# Patient Record
Sex: FEMALE | Race: WHITE | NOT HISPANIC OR LATINO | Employment: OTHER | URBAN - METROPOLITAN AREA
[De-identification: names, ages, dates, MRNs, and addresses within clinical notes are randomized per-mention and may not be internally consistent; named-entity substitution may affect disease eponyms.]

---

## 2020-06-19 ENCOUNTER — DOCUMENTATION ONLY (OUTPATIENT)
Dept: OTHER | Facility: CLINIC | Age: 85
End: 2020-06-19

## 2020-06-22 DIAGNOSIS — T14.8XXA OPEN WOUND: Primary | ICD-10-CM

## 2020-06-23 RX ORDER — HONEY 100 %
PASTE (ML) TOPICAL
Qty: 44 ML | Refills: 97 | Status: SHIPPED | OUTPATIENT
Start: 2020-06-23 | End: 2020-07-05

## 2020-06-26 ENCOUNTER — TELEPHONE (OUTPATIENT)
Dept: GERIATRICS | Facility: CLINIC | Age: 85
End: 2020-06-26

## 2020-06-26 NOTE — TELEPHONE ENCOUNTER
[facility] now requests orders and shares plan of care/discharge summaries for some patients through [facility].  Please REPLY TO THIS MESSAGE OR ROUTE BACK TO THE AUTHOR in order to give authorization for orders when needed.  This is considered a verbal order, you will still receive a faxed copy of orders for signature.  Thank you for your assistance in improving collaboration for our patients.    ORDER  Skilled PT Intervention 2w3 (start next week) for caregiver education, strengthening, transfer training (potential to progress from [facility]inda?) and fall prevention.       **Wondering if she has any weightbearing precautions on LLE due to heel wound and if spinal precautions are still needed?

## 2020-06-29 DIAGNOSIS — E03.9 HYPOTHYROIDISM, UNSPECIFIED TYPE: Primary | ICD-10-CM

## 2020-06-29 DIAGNOSIS — I10 BENIGN ESSENTIAL HYPERTENSION: ICD-10-CM

## 2020-06-29 RX ORDER — LEVOTHYROXINE SODIUM 100 UG/1
TABLET ORAL
Qty: 31 TABLET | Refills: 97 | Status: SHIPPED | OUTPATIENT
Start: 2020-06-29

## 2020-06-29 RX ORDER — DILTIAZEM HYDROCHLORIDE 180 MG/1
CAPSULE, EXTENDED RELEASE ORAL
Qty: 31 CAPSULE | Refills: 97 | Status: SHIPPED | OUTPATIENT
Start: 2020-06-29 | End: 2020-07-22

## 2020-06-30 ENCOUNTER — ASSISTED LIVING VISIT (OUTPATIENT)
Dept: GERIATRICS | Facility: CLINIC | Age: 85
End: 2020-06-30
Payer: COMMERCIAL

## 2020-06-30 VITALS
HEART RATE: 97 BPM | SYSTOLIC BLOOD PRESSURE: 127 MMHG | RESPIRATION RATE: 16 BRPM | DIASTOLIC BLOOD PRESSURE: 70 MMHG | TEMPERATURE: 95.5 F

## 2020-06-30 DIAGNOSIS — S91.309A WOUND OF FOOT: ICD-10-CM

## 2020-06-30 DIAGNOSIS — I89.0 LYMPHEDEMA: ICD-10-CM

## 2020-06-30 DIAGNOSIS — E03.9 HYPOTHYROIDISM, UNSPECIFIED TYPE: ICD-10-CM

## 2020-06-30 DIAGNOSIS — R53.81 PHYSICAL DECONDITIONING: ICD-10-CM

## 2020-06-30 DIAGNOSIS — T14.8XXA OPEN WOUND: ICD-10-CM

## 2020-06-30 DIAGNOSIS — G06.2 EPIDURAL ABSCESS: Primary | ICD-10-CM

## 2020-06-30 DIAGNOSIS — I10 BENIGN ESSENTIAL HYPERTENSION: ICD-10-CM

## 2020-06-30 DIAGNOSIS — I48.91 ATRIAL FIBRILLATION, UNSPECIFIED TYPE (H): ICD-10-CM

## 2020-06-30 PROCEDURE — 99207 ZZC CDG-CODE CATEGORY CHANGED: CPT | Performed by: NURSE PRACTITIONER

## 2020-06-30 RX ORDER — AMOXICILLIN 500 MG/1
1000 CAPSULE ORAL 2 TIMES DAILY
COMMUNITY
End: 2020-08-08

## 2020-06-30 RX ORDER — NYSTATIN 100000 [USP'U]/G
POWDER TOPICAL 3 TIMES DAILY PRN
COMMUNITY
End: 2020-07-12

## 2020-06-30 RX ORDER — ACETAMINOPHEN 325 MG/1
650 TABLET ORAL EVERY 4 HOURS PRN
COMMUNITY
End: 2021-03-26 | Stop reason: ALTCHOICE

## 2020-06-30 RX ORDER — POLYETHYLENE GLYCOL 3350 17 G/17G
17 POWDER, FOR SOLUTION ORAL DAILY PRN
COMMUNITY

## 2020-06-30 NOTE — LETTER
"    6/30/2020        RE: Ila Loredo  [facility]  [address]  [address]  [place] [address]        [place] [facility]  PRIMARY CARE PROVIDER AND CLINIC:  Trinity Singh, OPAL CNP, [address] / [place] [address]  Chief Complaint   Patient presents with     Establish Care     [facility] Medical Record Number:  [id number]  Place of Service where encounter took place:  [facility] [[id number]]    Ila Loredo  is a 89 year old  (6/4/1930), admitted to the above facility from home..  Admitted to this facility for  rehab, medical management and nursing care.    HPI:    HPI information obtained from: facility chart records, facility staff, patient report, [facility] chart review, Care Everywhere Epic chart review and family/first contact 's report.     Brief Summary of Hospital Course:     This is a 89-year-old female, with a past medical history significant for lymphedema, hypertension, hypothyroidism and chronic distal left lower extremity wound, who was admitted to [facility] on 3/21/20 after a fall with nausea, weakness and decreased oral intake. Labs revealed WBC 52.3, Creatinine 1.2, Procalcitonin 15.65. A lumbar spine x-ray revealed no acute findings, but a lumbar MRI revealed \"1. Lobulated dorsal epidural fluid collection extending from T11-L2-3 demonstrates thin peripheral enhancement and is most compatible with abscess\".  IV antibiotics were initiated. On 3/23/20, noted to have significant decline in leg function and was brought to the OR for an emergent I&D of presumed abscess. Culture positive for Strep species consistent with blood culture positive for Streptococcus Group B. Vancomycin was discontinued on 3/24/20 with continuation of Ceftriaxone for 25 days upon hospital discharge.The drain was removed on 3/25/20. An bilateral lower extremity ultrasound revealed a \"short segment " "of nonocclusive thrombus within the left popliteal vein potentially subacute or chronic\". An MRI of the right foot revealed \"findings of osteomyelitis of the proximal and distal phalanges of the great toe\". Podiatry debrided foot a bedside. [place] MRI findings were reactive, not osteomyelitis. Had brief episode of atrial fibrillation with RVR and was started on Diltiazem. Not a candidate for anticoagulation. On 4/2/20, had respiratory failure and near arrest. Patient suctioned for an enormous dried, hardened mucous plug. Also had delirium.     Discharged to [facility]. Continued to be primarily wheelchair bound requiring assistance with transfers and could not return home.     Updates on Status Since Skilled nursing Admission:     Most of the history comes from . Patient lived in a condo with her  PTA. Goal is to get stronger with home therapy so she can return home. Has 5 children, 2 girls and 3 boys, most of which live in the area. 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 Would prefer to only have visits when medically indicated.     When reviewing code status, patient initially states she would be agreeable to CPR and intubation if there was a chance she'd survive. When discussing CPR and intubation in further detail, starts to question her decision. Elects to leave current code status on file. Will think about it.  tells patient she would want to be DNR/DNI.    CODE STATUS/ADVANCE DIRECTIVES DISCUSSION:   DNR / DNI  Patient's living condition: lives in an assisted living facility  ALLERGIES: Codeine  PAST MEDICAL HISTORY:  has no past medical history on file.  PAST SURGICAL HISTORY:   has no past surgical history on file.  FAMILY HISTORY: family history is not on file.  SOCIAL HISTORY:       Post Discharge Medication Reconciliation Status: discharge medications reconciled, continue medications without change    Current Outpatient Medications   Medication Sig Dispense " Refill     acetaminophen (TYLENOL) 325 MG tablet Take 650 mg by mouth every 4 hours as needed for mild pain       amoxicillin (AMOXIL) 500 MG capsule Take 1,000 mg by mouth 2 times daily       CARTIA  MG 24 hr capsule TAKE 1 CAPSULE BY MOUTH ONCE DAILY 31 capsule 97     levothyroxine (SYNTHROID/LEVOTHROID) 100 MCG tablet TAKE 1 TABLET BY MOUTH ONCE DAILY 31 tablet 97     nystatin (MYCOSTATIN) 207553 UNIT/GM external powder Apply topically 3 times daily as needed       polyethylene glycol (MIRALAX) 17 g packet Take 1 packet by mouth as needed for constipation       Wound Dressings (MEDIHONEY WOUND/BURN DRESSING) paste Apply topically daily And PRN 44 mL 97     ROS:  4 point ROS including Respiratory, CV, GI and , other than that noted in the HPI,  is negative    Vitals:  /70   Pulse 97   Temp 95.5  F (35.3  C)   Resp 16   Exam:  GENERAL APPEARANCE:  Alert, in no distress  ENT:  Mouth and posterior oropharynx normal, moist mucous membranes  EYES:  EOM, conjunctivae, lids, pupils and irises normal  RESP:  respiratory effort and palpation of chest normal, lungs clear to auscultation , no respiratory distress  CV:  Palpation and auscultation of heart done , irregular rhythm regular rate  M/S:   Lymphedema on BLE  SKIN:  Dressing in place on left foot  PSYCH:  oriented to person and place    Lab/Diagnostic data:  Labs done in SNF are in [facility]. Please refer to them using [facility]/Care Everywhere.    ASSESSMENT/PLAN:  Epidural Abscess at T11-L3 S/P I&D on 3/23/20 and Decompression with Bacteremia. Culture positive for Strep species consistent with blood culture positive for Streptococcus Group B. Vancomycin was completed on 3/24/20 with continuation of Ceftriaxone until 5/7/20 then Amoxicillin until 8/1/20. Per ID note on 6/3/20, overall not improving. Not walking. Refuses MRI of lumbar spine, but did get CT of lumbar spine on 6/2/20 with no clear infection or enhancement. WBC 14 with ESR and CRP  remaining elevated, but improved. Due for follow-up in the next week or so. WBC, Creatinine, ESR and CRP every 2 weeks.    Left Heel and Stage II Coccyx Wound. Medihoney applied daily and PRN to coccyx wound. [facility] RN following.    Atrial Fibrillation. With RVR during hospitalization. Started on Diltiazem. Not a candidate for anticoagulation given frequent falls. Has had episodes of elevated heart rate since admission to AL, but heart rate is not always taken manually. Requested staff check manually to confirm readings on machine are accurate. [facility] RN following.    Cognitive Impairment.  does most of the talking during today's visit. Experienced delirium during hospitalization. SLUMS 22/30 on 6/15/20. CPT Score 4.0 on 6/29/20. [facility] to evaluate cognitive function.    Bilateral Lower Extremity Edema. [facility] to start compression bandaging. No recent weight on file.    Hypertension. Monitor blood pressure monthly. Takes Diltiazem for atrial fibrillation as noted above.    Hypothyroidism. Last TSH 2.10 on 3/12/20. Continue Levothyroxine as ordered.    Physical Deconditioning. Secondary to most recent hospitalization and co-morbidities. Home Physical and Occupational Therapy ordered as well as RN. Goal is for patient to get stronger and return home with .    Total time spent with patient visit at the skilled nursing facility was 35 min including patient visit and review of past records. Greater than 50% of total time spent with counseling and coordinating care due to review of code status with patient, discussing role of provider, plans going forward and overall health status with patient and .     Electronically signed by:  OPAL Patterson CNP                Sincerely,        OPAL Patterson CNP

## 2020-06-30 NOTE — PROGRESS NOTES
"[place] [facility]  PRIMARY CARE PROVIDER AND CLINIC:  Trinity Singh, APRN CNP, [address] / [place] [address]  Chief Complaint   Patient presents with     Establish Care     [facility] Medical Record Number:  [id number]  Place of Service where encounter took place:  [facility] [[id number]]    Ila Loredo  is a 89 year old  (6/4/1930), admitted to the above facility from home..  Admitted to this facility for  rehab, medical management and nursing care.    HPI:    HPI information obtained from: facility chart records, facility staff, patient report, [facility] chart review, Care Everywhere Epic chart review and family/first contact 's report.     Brief Summary of Hospital Course:     This is a 89-year-old female, with a past medical history significant for lymphedema, hypertension, hypothyroidism and chronic distal left lower extremity wound, who was admitted to [facility] on 3/21/20 after a fall with nausea, weakness and decreased oral intake. Labs revealed WBC 52.3, Creatinine 1.2, Procalcitonin 15.65. A lumbar spine x-ray revealed no acute findings, but a lumbar MRI revealed \"1. Lobulated dorsal epidural fluid collection extending from T11-L2-3 demonstrates thin peripheral enhancement and is most compatible with abscess\".  IV antibiotics were initiated. On 3/23/20, noted to have significant decline in leg function and was brought to the OR for an emergent I&D of presumed abscess. Culture positive for Strep species consistent with blood culture positive for Streptococcus Group B. Vancomycin was discontinued on 3/24/20 with continuation of Ceftriaxone for 25 days upon hospital discharge.The drain was removed on 3/25/20. An bilateral lower extremity ultrasound revealed a \"short segment of nonocclusive thrombus within the left popliteal vein potentially subacute or chronic\". An MRI of the right foot revealed " "\"findings of osteomyelitis of the proximal and distal phalanges of the great toe\". Podiatry debrided foot a bedside. [place] MRI findings were reactive, not osteomyelitis. Had brief episode of atrial fibrillation with RVR and was started on Diltiazem. Not a candidate for anticoagulation. On 4/2/20, had respiratory failure and near arrest. Patient suctioned for an enormous dried, hardened mucous plug. Also had delirium.     Discharged to [facility]. Continued to be primarily wheelchair bound requiring assistance with transfers and could not return home.     Updates on Status Since Skilled nursing Admission:     Most of the history comes from . Patient lived in a condo with her  PTA. Goal is to get stronger with home therapy so she can return home. Has 5 children, 2 girls and 3 boys, most of which live in the area. 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 Would prefer to only have visits when medically indicated.     When reviewing code status, patient initially states she would be agreeable to CPR and intubation if there was a chance she'd survive. When discussing CPR and intubation in further detail, starts to question her decision. Elects to leave current code status on file. Will think about it.  tells patient she would want to be DNR/DNI.    CODE STATUS/ADVANCE DIRECTIVES DISCUSSION:   DNR / DNI  Patient's living condition: lives in an assisted living facility  ALLERGIES: Codeine  PAST MEDICAL HISTORY:  has no past medical history on file.  PAST SURGICAL HISTORY:   has no past surgical history on file.  FAMILY HISTORY: family history is not on file.  SOCIAL HISTORY:       Post Discharge Medication Reconciliation Status: discharge medications reconciled, continue medications without change    Current Outpatient Medications   Medication Sig Dispense Refill     acetaminophen (TYLENOL) 325 MG tablet Take 650 mg by mouth every 4 hours as needed for mild pain       " amoxicillin (AMOXIL) 500 MG capsule Take 1,000 mg by mouth 2 times daily       CARTIA  MG 24 hr capsule TAKE 1 CAPSULE BY MOUTH ONCE DAILY 31 capsule 97     levothyroxine (SYNTHROID/LEVOTHROID) 100 MCG tablet TAKE 1 TABLET BY MOUTH ONCE DAILY 31 tablet 97     nystatin (MYCOSTATIN) 298910 UNIT/GM external powder Apply topically 3 times daily as needed       polyethylene glycol (MIRALAX) 17 g packet Take 1 packet by mouth as needed for constipation       Wound Dressings (MEDIHONEY WOUND/BURN DRESSING) paste Apply topically daily And PRN 44 mL 97     ROS:  4 point ROS including Respiratory, CV, GI and , other than that noted in the HPI,  is negative    Vitals:  /70   Pulse 97   Temp 95.5  F (35.3  C)   Resp 16   Exam:  GENERAL APPEARANCE:  Alert, in no distress  ENT:  Mouth and posterior oropharynx normal, moist mucous membranes  EYES:  EOM, conjunctivae, lids, pupils and irises normal  RESP:  respiratory effort and palpation of chest normal, lungs clear to auscultation , no respiratory distress  CV:  Palpation and auscultation of heart done , irregular rhythm regular rate  M/S:   Lymphedema on BLE  SKIN:  Dressing in place on left foot  PSYCH:  oriented to person and place    Lab/Diagnostic data:  Labs done in SNF are in [facility]. Please refer to them using [employer] Everywhere.    ASSESSMENT/PLAN:  Epidural Abscess at T11-L3 S/P I&D on 3/23/20 and Decompression with Bacteremia. Culture positive for Strep species consistent with blood culture positive for Streptococcus Group B. Vancomycin was completed on 3/24/20 with continuation of Ceftriaxone until 5/7/20 then Amoxicillin until 8/1/20. Per ID note on 6/3/20, overall not improving. Not walking. Refuses MRI of lumbar spine, but did get CT of lumbar spine on 6/2/20 with no clear infection or enhancement. WBC 14 with ESR and CRP remaining elevated, but improved. Due for follow-up in the next week or so. WBC, Creatinine, ESR and CRP every 2  weeks.    Left Heel and Stage II Coccyx Wound. Medihoney applied daily and PRN to coccyx wound. [facility] RN following.    Atrial Fibrillation. With RVR during hospitalization. Started on Diltiazem. Not a candidate for anticoagulation given frequent falls. Has had episodes of elevated heart rate since admission to AL, but heart rate is not always taken manually. Requested staff check manually to confirm readings on machine are accurate. [facility] RN following.    Cognitive Impairment.  does most of the talking during today's visit. Experienced delirium during hospitalization. SLUMS 22/30 on 6/15/20. CPT Score 4.0 on 6/29/20. [facility] to evaluate cognitive function.    Bilateral Lower Extremity Edema. [facility] to start compression bandaging. No recent weight on file.    Hypertension. Monitor blood pressure monthly. Takes Diltiazem for atrial fibrillation as noted above.    Hypothyroidism. Last TSH 2.10 on 3/12/20. Continue Levothyroxine as ordered.    Physical Deconditioning. Secondary to most recent hospitalization and co-morbidities. Home Physical and Occupational Therapy ordered as well as RN. Goal is for patient to get stronger and return home with .    Total time spent with patient visit at the skilled nursing facility was 35 min including patient visit and review of past records. Greater than 50% of total time spent with counseling and coordinating care due to review of code status with patient, discussing role of provider, plans going forward and overall health status with patient and .     Electronically signed by:  OPAL Patterson CNP

## 2020-07-05 RX ORDER — HONEY 100 %
PASTE (ML) TOPICAL DAILY
Qty: 44 ML | Refills: 97
Start: 2020-07-05 | End: 2021-05-18

## 2020-07-12 DIAGNOSIS — B37.2 CANDIDIASIS OF SKIN: Primary | ICD-10-CM

## 2020-07-12 RX ORDER — NYSTATIN 100000 [USP'U]/G
POWDER TOPICAL
Qty: 60 G | Refills: 97 | Status: SHIPPED | OUTPATIENT
Start: 2020-07-12

## 2020-07-15 ENCOUNTER — RECORDS - HEALTHEAST (OUTPATIENT)
Dept: LAB | Facility: CLINIC | Age: 85
End: 2020-07-15

## 2020-07-22 ENCOUNTER — ASSISTED LIVING VISIT (OUTPATIENT)
Dept: GERIATRICS | Facility: CLINIC | Age: 85
End: 2020-07-22
Payer: COMMERCIAL

## 2020-07-22 VITALS
SYSTOLIC BLOOD PRESSURE: 127 MMHG | HEART RATE: 98 BPM | RESPIRATION RATE: 16 BRPM | DIASTOLIC BLOOD PRESSURE: 79 MMHG | TEMPERATURE: 95.8 F

## 2020-07-22 DIAGNOSIS — I48.91 ATRIAL FIBRILLATION, UNSPECIFIED TYPE (H): Primary | ICD-10-CM

## 2020-07-22 DIAGNOSIS — E03.9 HYPOTHYROIDISM, UNSPECIFIED TYPE: ICD-10-CM

## 2020-07-22 DIAGNOSIS — T14.8XXA OPEN WOUND: ICD-10-CM

## 2020-07-22 DIAGNOSIS — I10 BENIGN ESSENTIAL HYPERTENSION: ICD-10-CM

## 2020-07-22 DIAGNOSIS — R60.0 LOCALIZED EDEMA: ICD-10-CM

## 2020-07-22 RX ORDER — DILTIAZEM HYDROCHLORIDE 300 MG/1
300 CAPSULE, COATED, EXTENDED RELEASE ORAL DAILY
Start: 2020-07-22 | End: 2020-07-29

## 2020-07-22 NOTE — PROGRESS NOTES
[place] [facility]  [facility] Medical Record Number:  [id number]  Place of Service where encounter took place:  [facility] [[id number]]  Chief Complaint   Patient presents with     RECHECK     HPI:    Ila Loredo  is a 89 year old (6/4/1930), who is being seen today for an episodic care visit.  HPI information obtained from: facility chart records, facility staff, patient report, [facility] chart review and family/first contact  report. Today's concern is:    Epidural Abscess at T11-L3 S/P I&D on 3/23/20 and Decompression with Bacteremia. Last seen by Dr. Coffey on 6/4/20 with instructions to follow-up in 4-5 weeks. Today, patient notes no concerns. Vital signs on 7/20/20 stable.      Left Heel and Stage II Coccyx Wound. Patient reports wounds are improving. Left heel on 7/20/20 noted to be 1x2 and 2x0 with .2 cm slough that is improving. Sacral wound with 1 area healed and 2 others improving, 0.7 x 0, 0.9 x 0.1 cm upper. Lower 0.2 x 0.2 x 0.1 cm.     Atrial Fibrillation. Home Health RN reported tachycardia on 6/19/20. Pulse 86 on 6/21/20 and 97 on 6/29/20. Also received report on 7/13/20 of an elevated heart rate of 134 and blood pressure 122/80 on 7/10/20. Order given to increase Diltiazem on 7/13/20. Daily blood pressure and heart rate ordered x 5 days with systolic blood pressure range 108-127. Diastolic blood pressure from 72-83. Heart rate . Per patient report, denies palpitations.      Cognitive Impairment. CPT 4.0 on 6/29/20. SLUMS 22/30 on 6/15/20.      Bilateral Lower Extremity Edema. Continues to work with Occupational Therapy. Compression bandaging applied. No recent weights available to review.     Hypertension. Blood pressures obtained daily x 5 days starting on 7/14/20. Systolic blood pressure range 108-127. Diastolic blood pressure range from 72-83     Hypothyroidism. Last TSH 2.10 on 3/12/20    Past Medical and Surgical  History reviewed in Epic today.    MEDICATIONS:  Current Outpatient Medications   Medication Sig Dispense Refill     acetaminophen (TYLENOL) 325 MG tablet Take 650 mg by mouth every 4 hours as needed for mild pain       amoxicillin (AMOXIL) 500 MG capsule Take 1,000 mg by mouth 2 times daily       CARTIA  MG 24 hr capsule TAKE 1 CAPSULE BY MOUTH ONCE DAILY 31 capsule 97     levothyroxine (SYNTHROID/LEVOTHROID) 100 MCG tablet TAKE 1 TABLET BY MOUTH ONCE DAILY 31 tablet 97     NYAMYC 033670 UNIT/GM external powder APPLY TOPICALLY TO AFFECTED AREA(S) THREE TIMES DAILY AS NEEDED 60 g 97     polyethylene glycol (MIRALAX) 17 g packet Take 1 packet by mouth as needed for constipation       Wound Dressings (MEDIHONEY WOUND/BURN DRESSING) paste Apply topically daily And PRN 44 mL 97     REVIEW OF SYSTEMS:  4 point ROS including Respiratory, CV, GI and , other than that noted in the HPI,  is negative    Objective:  /79   Pulse 98   Temp 95.8  F (35.4  C)   Resp 16   Exam Limited due to COVID-19 Pandemic:  GENERAL APPEARANCE:  Alert, in no distress  ENT:  Mouth and posterior oropharynx normal, moist mucous membranes  EYES:  EOM, conjunctivae, lids, pupils and irises normal  CV:  Palpation and auscultation of heart done , irregular rhythm tachycardia  SKIN:  Compression bandages on BLE  PSYCH:  affect and mood normal    Labs:   Labs done in SNF are in [facility]. Please refer to them using [facility]/Care Everywhere.    ASSESSMENT/PLAN:  Epidural Abscess at T11-L3 S/P I&D on 3/23/20 and Decompression with Bacteremia. Culture positive for Strep species consistent with blood culture positive for Streptococcus Group B. Vancomycin was completed on 3/24/20 with continuation of Ceftriaxone until 5/7/20 then Amoxicillin until 8/1/20. Per ID note on 6/3/20, refuses MRI of lumbar spine, but did get CT of lumbar spine on 6/2/20 with no clear infection or enhancement. Will discuss arranging ID follow-up with patient and  family at care conference. WBC, Creatinine, ESR and CRP every 2 weeks per ID. Will need to check with staff if this has been ordered.      Left Heel and Stage II Coccyx Wound. Improving per [facility] RN. Continue dressing changes as ordered.     Atrial Fibrillation. With RVR during hospitalization. Started on Diltiazem during hospitalization. Not a candidate for anticoagulation given frequent falls. Diltiazem last increased on 7/13/20. During today's visit, heart rate irregular and 110. Will increase Diltiazem to 300 mg every day. Discussed with patient and  establishing care with Cardiologist given elevated heart rate and risk for heart attack or stroke. Patient and  report she's always had an elevated heart rate. Would rather not go out for an appointment. Reviewed option of virtual or telephone visit. Cannot find formal Cardiology consult performed in hospital so will need to establish care. Will discuss in further detail with daughter and son during care conference on 7/27/20.      Cognitive Impairment.  does most of the talking during today's visit. Experienced delirium during hospitalization. SLUMS 22/30 on 6/15/20. CPT Score 4.0 on 6/29/20. [facility] to evaluate cognitive function.     Bilateral Lower Extremity Edema. [facility] performing compression bandaging. No recent weight on file.     Hypertension. Will ask staff to monitor blood pressure daily x 1 week due to increase in Diltiazem.      Hypothyroidism. Last TSH 2.10 on 3/12/20. Continue Levothyroxine as ordered.     Physical Deconditioning. Secondary to most recent hospitalization and co-morbidities. Home Physical and Occupational Therapy ordered as well as RN. Goal is for patient to get stronger and return home with .    Electronically signed by:  OPAL Patterson CNP

## 2020-07-22 NOTE — LETTER
7/22/2020        RE: Ila Loredo  [facility]  [address] [facility]  [address] [place] [id number]        [place] [facility]  [place] Medical Record Number:  [id number]  Place of Service where encounter took place:  [facility] [[id number]]  Chief Complaint   Patient presents with     RECHECK     HPI:    Ila Loredo  is a 89 year old (6/4/1930), who is being seen today for an episodic care visit.  HPI information obtained from: facility chart records, facility staff, patient report, [facility] chart review and family/first contact  report. Today's concern is:    Epidural Abscess at T11-L3 S/P I&D on 3/23/20 and Decompression with Bacteremia. Last seen by Dr. Coffey on 6/4/20 with instructions to follow-up in 4-5 weeks. Today, patient notes no concerns. Vital signs on 7/20/20 stable.      Left Heel and Stage II Coccyx Wound. Patient reports wounds are improving. Left heel on 7/20/20 noted to be 1x2 and 2x0 with .2 cm slough that is improving. Sacral wound with 1 area healed and 2 others improving, 0.7 x 0, 0.9 x 0.1 cm upper. Lower 0.2 x 0.2 x 0.1 cm.     Atrial Fibrillation. Home Health RN reported tachycardia on 6/19/20. Pulse 86 on 6/21/20 and 97 on 6/29/20. Also received report on 7/13/20 of an elevated heart rate of 134 and blood pressure 122/80 on 7/10/20. Order given to increase Diltiazem on 7/13/20. Daily blood pressure and heart rate ordered x 5 days with systolic blood pressure range 108-127. Diastolic blood pressure from 72-83. Heart rate . Per patient report, denies palpitations.      Cognitive Impairment. CPT 4.0 on 6/29/20. SLUMS 22/30 on 6/15/20.      Bilateral Lower Extremity Edema. Continues to work with Occupational Therapy. Compression bandaging applied. No recent weights available to review.     Hypertension. Blood pressures obtained daily x 5 days starting on 7/14/20. Systolic blood pressure  range 108-127. Diastolic blood pressure range from 72-83     Hypothyroidism. Last TSH 2.10 on 3/12/20    Past Medical and Surgical History reviewed in Epic today.    MEDICATIONS:  Current Outpatient Medications   Medication Sig Dispense Refill     acetaminophen (TYLENOL) 325 MG tablet Take 650 mg by mouth every 4 hours as needed for mild pain       amoxicillin (AMOXIL) 500 MG capsule Take 1,000 mg by mouth 2 times daily       CARTIA  MG 24 hr capsule TAKE 1 CAPSULE BY MOUTH ONCE DAILY 31 capsule 97     levothyroxine (SYNTHROID/LEVOTHROID) 100 MCG tablet TAKE 1 TABLET BY MOUTH ONCE DAILY 31 tablet 97     NYAMYC 022474 UNIT/GM external powder APPLY TOPICALLY TO AFFECTED AREA(S) THREE TIMES DAILY AS NEEDED 60 g 97     polyethylene glycol (MIRALAX) 17 g packet Take 1 packet by mouth as needed for constipation       Wound Dressings (MEDIHONEY WOUND/BURN DRESSING) paste Apply topically daily And PRN 44 mL 97     REVIEW OF SYSTEMS:  4 point ROS including Respiratory, CV, GI and , other than that noted in the HPI,  is negative    Objective:  /79   Pulse 98   Temp 95.8  F (35.4  C)   Resp 16   Exam Limited due to COVID-19 Pandemic:  GENERAL APPEARANCE:  Alert, in no distress  ENT:  Mouth and posterior oropharynx normal, moist mucous membranes  EYES:  EOM, conjunctivae, lids, pupils and irises normal  CV:  Palpation and auscultation of heart done , irregular rhythm tachycardia  SKIN:  Compression bandages on BLE  PSYCH:  affect and mood normal    Labs:   [facility]. Please refer to them using [facility]/Care Everywhere.    ASSESSMENT/PLAN:  Epidural Abscess at T11-L3 S/P I&D on 3/23/20 and Decompression with Bacteremia. Culture positive for Strep species consistent with blood culture positive for Streptococcus Group B. Vancomycin was completed on 3/24/20 with continuation of Ceftriaxone until 5/7/20 then Amoxicillin until 8/1/20. Per ID note on 6/3/20, refuses MRI of lumbar spine, but did  get CT of lumbar spine on 6/2/20 with no clear infection or enhancement. Will discuss arranging ID follow-up with patient and family at care conference. WBC, Creatinine, ESR and CRP every 2 weeks per ID. Will need to check with staff if this has been ordered.      Left Heel and Stage II Coccyx Wound. Improving per [facility] RN. Continue dressing changes as ordered.     Atrial Fibrillation. With RVR during hospitalization. Started on Diltiazem during hospitalization. Not a candidate for anticoagulation given frequent falls. Diltiazem last increased on 7/13/20. During today's visit, heart rate irregular and 110. Will increase Diltiazem to 300 mg every day. Discussed with patient and  establishing care with Cardiologist given elevated heart rate and risk for heart attack or stroke. Patient and  report she's always had an elevated heart rate. Would rather not go out for an appointment. Reviewed option of virtual or telephone visit. Cannot find formal Cardiology consult performed in hospital so will need to establish care. Will discuss in further detail with daughter and son during care conference on 7/27/20.      Cognitive Impairment.  does most of the talking during today's visit. Experienced delirium during hospitalization. SLUMS 22/30 on 6/15/20. CPT Score 4.0 on 6/29/20. [facility] to evaluate cognitive function.     Bilateral Lower Extremity Edema. [facility] performing compression bandaging. No recent weight on file.     Hypertension. Will ask staff to monitor blood pressure daily x 1 week due to increase in Diltiazem.      Hypothyroidism. Last TSH 2.10 on 3/12/20. Continue Levothyroxine as ordered.     Physical Deconditioning. Secondary to most recent hospitalization and co-morbidities. Home Physical and Occupational Therapy ordered as well as RN. Goal is for patient to get stronger and return home with .    Electronically  signed by:  OPAL Patterson CNP             Sincerely,        OPAL Patterson CNP

## 2020-07-23 LAB
SARS-COV-2 BY NAA - HISTORICAL: NOT DETECTED
SARS-COV-2 SOURCE - HISTORICAL: NORMAL

## 2020-07-27 ENCOUNTER — TELEPHONE (OUTPATIENT)
Dept: GERIATRICS | Facility: CLINIC | Age: 85
End: 2020-07-27

## 2020-07-28 ENCOUNTER — TRANSFERRED RECORDS (OUTPATIENT)
Dept: HEALTH INFORMATION MANAGEMENT | Facility: CLINIC | Age: 85
End: 2020-07-28

## 2020-07-28 ENCOUNTER — RECORDS - HEALTHEAST (OUTPATIENT)
Dept: LAB | Facility: CLINIC | Age: 85
End: 2020-07-28

## 2020-07-28 DIAGNOSIS — I10 BENIGN ESSENTIAL HYPERTENSION: ICD-10-CM

## 2020-07-28 LAB
C REACTIVE PROTEIN LHE: 1.3 MG/DL (ref 0–0.8)
CREAT SERPL-MCNC: 0.61 MG/DL (ref 0.6–1.1)
CREAT SERPL-MCNC: 0.61 MG/DL (ref 0.6–1.1)
ERYTHROCYTE [SEDIMENTATION RATE] IN BLOOD BY WESTERGREN METHOD: 67 MM/HR (ref 0–20)
GFR SERPL CREATININE-BSD FRML MDRD: >60 ML/MIN/1.73M2
GFR SERPL CREATININE-BSD FRML MDRD: >60 ML/MIN/1.73M2
WBC # BLD AUTO: 16.4 THOU/UL (ref 4–11)
WBC: 16.4 THOU/UL (ref 4–11)

## 2020-07-29 RX ORDER — DILTIAZEM HYDROCHLORIDE 300 MG/1
CAPSULE, EXTENDED RELEASE ORAL
Qty: 37 CAPSULE | Refills: 97 | Status: SHIPPED | OUTPATIENT
Start: 2020-07-29 | End: 2020-11-25

## 2020-07-29 NOTE — TELEPHONE ENCOUNTER
[place] [facility] TELEPHONE ENCOUNTER    Chief Complaint   Patient presents with     Tachycardia     Ila Loredo is a 89 year old  (6/4/1930)    Today, a care conference was held by [facility]. Patient, Home PT, Home OT, SW, , son and daughter were present on the call. Reviewed elevated heart rates and risks of continued elevation. Recommended Cardiology follow-up. Daughter was in agreement with Cardiology consult. Would prefer [facility] and virtual.  would like to try 1 more week on increased dosage of Diltiazem. If heart rate < 100, would prefer not to follow-up with Cardiology.     Also recommended follow-up with Infectious Disease as this is due      Order given to monitor heart rate and pulse x 1 week    Order placed for Cardiology. Family requests to arrange appointment and will cancel if heart rate improves.    Electronically signed by:   OPAL Patterson CNP

## 2020-08-05 ENCOUNTER — ASSISTED LIVING VISIT (OUTPATIENT)
Dept: GERIATRICS | Facility: CLINIC | Age: 85
End: 2020-08-05
Payer: COMMERCIAL

## 2020-08-05 VITALS
HEART RATE: 97 BPM | SYSTOLIC BLOOD PRESSURE: 126 MMHG | DIASTOLIC BLOOD PRESSURE: 87 MMHG | RESPIRATION RATE: 16 BRPM | TEMPERATURE: 98.5 F

## 2020-08-05 DIAGNOSIS — I48.91 ATRIAL FIBRILLATION, UNSPECIFIED TYPE (H): Primary | ICD-10-CM

## 2020-08-05 DIAGNOSIS — D72.829 LEUKOCYTOSIS, UNSPECIFIED TYPE: ICD-10-CM

## 2020-08-05 DIAGNOSIS — L65.9 HAIR LOSS: ICD-10-CM

## 2020-08-05 DIAGNOSIS — S91.302D OPEN WOUND OF LEFT HEEL, SUBSEQUENT ENCOUNTER: ICD-10-CM

## 2020-08-05 NOTE — LETTER
8/5/2020        RE: Ila Loredo  [facility]  [address] [address]        [place] [facility]  [place] Medical Record Number:  [id number]  Place of Service where encounter took place:  [facility] [[id number]]  Chief Complaint   Patient presents with     RECHECK     HPI:    Ila Loredo  is a 89 year old (6/4/1930), who is being seen today for an episodic care visit.  HPI information obtained from: facility chart records, facility staff, patient report and [facility] chart review. Today's concern is:    Atrial Fibrillation. Home Health RN reported tachycardia on 6/19/20. Pulse 86 on 6/21/20 and 97 on 6/29/20. Also received report on 7/13/20 of an elevated heart rate of 134 and blood pressure 122/80 on 7/10/20. Order given to increase Diltiazem on 7/13/20. Heart rate remained elevated as high as 149. Diltiazem increased again on 7/24/20 and follow-up with Cardiology was discussed with patient and family during care conference. Patient and  would like to avoid Cardiology follow-up if possible. Hoping increase in Diltiazem will help. Upon review of documentation, most heart rates < 100 since 7/26/20 except single episode of tachycardia with reading 140 on 7/31/20. Today, patient denies palpations. As heart rates have improved, does not want to follow-up with Cardiology.    Left Heel and Stage II Coccyx Wound. Patient and  report wounds are improving. Report coccyx wound has healed. Question why Home Health RN did not come over this morning to change dressing on heels. Report they usually come M, W, F.     Hair Loss. Per patient report, when she king her hair or runs her fingers through it, will lose strands of hair. Started during most recent hospitalization. Questions why this is. Denies losing large chunks of hair.     Leukocytosis with History of Epidural Abscess at T11-L3 S/P I&D on  3/23/20 and Decompression with Bacteremia. WBC 16.4 on 7/28/20. No other recent labs available to review. Patient denies any issues such as shortness of breath.     Past Medical and Surgical History reviewed in Epic today.    MEDICATIONS:  Current Outpatient Medications   Medication Sig Dispense Refill     acetaminophen (TYLENOL) 325 MG tablet Take 650 mg by mouth every 4 hours as needed for mild pain       amoxicillin (AMOXIL) 500 MG capsule Take 1,000 mg by mouth 2 times daily       CARTIA  MG 24 hr capsule TAKE 1 CAPSULE BY MOUTH ONCE DAILY 37 capsule 97     levothyroxine (SYNTHROID/LEVOTHROID) 100 MCG tablet TAKE 1 TABLET BY MOUTH ONCE DAILY 31 tablet 97     NYAMYC 501582 UNIT/GM external powder APPLY TOPICALLY TO AFFECTED AREA(S) THREE TIMES DAILY AS NEEDED 60 g 97     polyethylene glycol (MIRALAX) 17 g packet Take 1 packet by mouth as needed for constipation       Wound Dressings (MEDIHONEY WOUND/BURN DRESSING) paste Apply topically daily And PRN 44 mL 97     REVIEW OF SYSTEMS:  4 point ROS including Respiratory, CV, GI and , other than that noted in the HPI,  is negative    Objective:  /87   Pulse 97   Temp 98.5  F (36.9  C)   Resp 16   Exam Limited due to COVID-19 Pandemic:  GENERAL APPEARANCE:  Alert, in no distress  ENT:  Mouth and posterior oropharynx normal, moist mucous membranes  EYES:  EOM, conjunctivae, lids, pupils and irises normal  RESP:  no respiratory distress  PSYCH:  affect and mood normal    Labs:   L[facility] in [facility]F [facility]. Please refer to them using [employer]Care Everywhere.    ASSESSMENT/PLAN:  Atrial Fibrillation. With RVR during hospitalization 3/21/20 through 4/10/20. Started on Diltiazem during hospitalization. Not a candidate for anticoagulation given frequent falls. Diltiazem last increased on 7/13/20. During today's visit, heart rate irregular and 110. Diltiazem increased to 300 mg every day on 7/24/20 with improvement in heart rate. Discussed with patient  and  establishing care with Cardiologist given elevated heart rate and risk for heart attack or stroke. Patient and  would rather not go out for an appointment. Elect not to follow-up with Cardiology given improvement in heart rate with increase in Diltiazem.     Left Heel and Stage II Coccyx Wound. Improving per patient and 's report. Per Home Health RN, patient now requires 2 times weekly dressing changes instead of 3 times weekly. Will remind  of this at next visit.     Hair Loss. Mild. No significant hair loss or bald spots noted. Suspect normal aging process, but will repeat TSH on next lab day.     Leukocytosis with History of Epidural Abscess at T11-L3 S/P I&D on 3/23/20 and Decompression with Bacteremia. Culture positive for Strep species consistent with blood culture positive for Streptococcus Group B. Vancomycin was completed on 3/24/20 with continuation of Ceftriaxone until 5/7/20 then Amoxicillin until 8/1/20. Per ID note on 6/3/20, refuses MRI of lumbar spine, but did get CT of lumbar spine on 6/2/20 with no clear infection or enhancement. Discussed arranging ID follow-up with patient and family at care conference on 7/27/20. Will need to ensure this has been done. WBC 16.4 on 7/28/20. No obvious sign of infection. Will repeat CBC with differential on next lab day. Further plans pending results.     Electronically signed by:  OPAL Patterson CNP          Sincerely,        OPAL Patterson CNP

## 2020-08-05 NOTE — PROGRESS NOTES
[place] [facility]  [place] Medical Record Number:  [id number]  Place of Service where encounter took place:  [facility] [[id number]]  Chief Complaint   Patient presents with     RECHECK     HPI:    Ila Loredo  is a 89 year old (6/4/1930), who is being seen today for an episodic care visit.  HPI information obtained from: facility chart records, facility staff, patient report and [facility] chart review. Today's concern is:    Atrial Fibrillation. Home Health RN reported tachycardia on 6/19/20. Pulse 86 on 6/21/20 and 97 on 6/29/20. Also received report on 7/13/20 of an elevated heart rate of 134 and blood pressure 122/80 on 7/10/20. Order given to increase Diltiazem on 7/13/20. Heart rate remained elevated as high as 149. Diltiazem increased again on 7/24/20 and follow-up with Cardiology was discussed with patient and family during care conference. Patient and  would like to avoid Cardiology follow-up if possible. Hoping increase in Diltiazem will help. Upon review of documentation, most heart rates < 100 since 7/26/20 except single episode of tachycardia with reading 140 on 7/31/20. Today, patient denies palpations. As heart rates have improved, does not want to follow-up with Cardiology.    Left Heel and Stage II Coccyx Wound. Patient and  report wounds are improving. Report coccyx wound has healed. Question why Home Health RN did not come over this morning to change dressing on heels. Report they usually come M, W, F.     Hair Loss. Per patient report, when she king her hair or runs her fingers through it, will lose strands of hair. Started during most recent hospitalization. Questions why this is. Denies losing large chunks of hair.     Leukocytosis with History of Epidural Abscess at T11-L3 S/P I&D on 3/23/20 and Decompression with Bacteremia. WBC 16.4 on 7/28/20. No other recent labs available to review. Patient denies any  issues such as shortness of breath.     Past Medical and Surgical History reviewed in Epic today.    MEDICATIONS:  Current Outpatient Medications   Medication Sig Dispense Refill     acetaminophen (TYLENOL) 325 MG tablet Take 650 mg by mouth every 4 hours as needed for mild pain       amoxicillin (AMOXIL) 500 MG capsule Take 1,000 mg by mouth 2 times daily       CARTIA  MG 24 hr capsule TAKE 1 CAPSULE BY MOUTH ONCE DAILY 37 capsule 97     levothyroxine (SYNTHROID/LEVOTHROID) 100 MCG tablet TAKE 1 TABLET BY MOUTH ONCE DAILY 31 tablet 97     NYAMYC 550231 UNIT/GM external powder APPLY TOPICALLY TO AFFECTED AREA(S) THREE TIMES DAILY AS NEEDED 60 g 97     polyethylene glycol (MIRALAX) 17 g packet Take 1 packet by mouth as needed for constipation       Wound Dressings (MEDIHONEY WOUND/BURN DRESSING) paste Apply topically daily And PRN 44 mL 97     REVIEW OF SYSTEMS:  4 point ROS including Respiratory, CV, GI and , other than that noted in the HPI,  is negative    Objective:  /87   Pulse 97   Temp 98.5  F (36.9  C)   Resp 16   Exam Limited due to COVID-19 Pandemic:  GENERAL APPEARANCE:  Alert, in no distress  ENT:  Mouth and posterior oropharynx normal, moist mucous membranes  EYES:  EOM, conjunctivae, lids, pupils and irises normal  RESP:  no respiratory distress  PSYCH:  affect and mood normal    Labs:   [facility]. Please refer to them using [employer]/Care Everywhere.    ASSESSMENT/PLAN:  Atrial Fibrillation. With RVR during hospitalization 3/21/20 through 4/10/20. Started on Diltiazem during hospitalization. Not a candidate for anticoagulation given frequent falls. Diltiazem last increased on 7/13/20. During today's visit, heart rate irregular and 110. Diltiazem increased to 300 mg every day on 7/24/20 with improvement in heart rate. Discussed with patient and  establishing care with Cardiologist given elevated heart rate and risk for heart attack or stroke. Patient and   would rather not go out for an appointment. Elect not to follow-up with Cardiology given improvement in heart rate with increase in Diltiazem.     Left Heel and Stage II Coccyx Wound. Improving per patient and 's report. Per Home Health RN, patient now requires 2 times weekly dressing changes instead of 3 times weekly. Will remind  of this at next visit.     Hair Loss. Mild. No significant hair loss or bald spots noted. Suspect normal aging process, but will repeat TSH on next lab day.     Leukocytosis with History of Epidural Abscess at T11-L3 S/P I&D on 3/23/20 and Decompression with Bacteremia. Culture positive for Strep species consistent with blood culture positive for Streptococcus Group B. Vancomycin was completed on 3/24/20 with continuation of Ceftriaxone until 5/7/20 then Amoxicillin until 8/1/20. Per ID note on 6/3/20, refuses MRI of lumbar spine, but did get CT of lumbar spine on 6/2/20 with no clear infection or enhancement. Discussed arranging ID follow-up with patient and family at care conference on 7/27/20. Will need to ensure this has been done. WBC 16.4 on 7/28/20. No obvious sign of infection. Will repeat CBC with differential on next lab day. Further plans pending results.     Electronically signed by:  OPAL Patterson CNP

## 2020-08-10 ENCOUNTER — RECORDS - HEALTHEAST (OUTPATIENT)
Dept: LAB | Facility: CLINIC | Age: 85
End: 2020-08-10

## 2020-08-11 ENCOUNTER — TRANSFERRED RECORDS (OUTPATIENT)
Dept: HEALTH INFORMATION MANAGEMENT | Facility: CLINIC | Age: 85
End: 2020-08-11

## 2020-08-11 LAB
ALBUMIN SERPL-MCNC: 2.9 G/DL (ref 3.5–5)
ALBUMIN SERPL-MCNC: 2.9 G/DL (ref 3.5–5)
ALP SERPL-CCNC: 86 U/L (ref 45–120)
ALP SERPL-CCNC: 86 U/L (ref 45–120)
ALT SERPL W P-5'-P-CCNC: 18 U/L (ref 0–45)
ALT SERPL-CCNC: 18 U/L (ref 0–45)
ANION GAP SERPL CALCULATED.3IONS-SCNC: 8 MMOL/L (ref 5–18)
ANION GAP SERPL CALCULATED.3IONS-SCNC: 8 MMOL/L (ref 5–18)
AST SERPL W P-5'-P-CCNC: 24 U/L (ref 0–40)
AST SERPL-CCNC: 24 U/L (ref 0–40)
BASOPHILS # BLD AUTO: 0 THOU/UL (ref 0–0.2)
BASOPHILS NFR BLD AUTO: 0 % (ref 0–2)
BILIRUB SERPL-MCNC: 0.4 MG/DL (ref 0–1)
BILIRUB SERPL-MCNC: 0.4 MG/DL (ref 0–1)
BUN SERPL-MCNC: 15 MG/DL (ref 8–28)
BUN SERPL-MCNC: 15 MG/DL (ref 8–28)
C REACTIVE PROTEIN LHE: 0.7 MG/DL (ref 0–0.8)
CALCIUM SERPL-MCNC: 8.9 MG/DL (ref 8.5–10.5)
CALCIUM SERPL-MCNC: 8.9 MG/DL (ref 8.5–10.5)
CHLORIDE BLD-SCNC: 107 MMOL/L (ref 98–107)
CHLORIDE SERPLBLD-SCNC: 107 MMOL/L (ref 98–107)
CO2 SERPL-SCNC: 23 MMOL/L (ref 22–31)
CO2 SERPL-SCNC: 23 MMOL/L (ref 22–31)
CREAT SERPL-MCNC: 0.64 MG/DL (ref 0.6–1.1)
CREAT SERPL-MCNC: 0.64 MG/DL (ref 0.6–1.1)
DIFFERENTIAL: ABNORMAL
EOSINOPHIL # BLD AUTO: 0.2 THOU/UL (ref 0–0.4)
EOSINOPHIL NFR BLD AUTO: 1 % (ref 0–6)
ERYTHROCYTE [DISTWIDTH] IN BLOOD BY AUTOMATED COUNT: 18.1 % (ref 11–14.5)
ERYTHROCYTE [DISTWIDTH] IN BLOOD BY AUTOMATED COUNT: 18.1 % (ref 11–14.5)
ERYTHROCYTE [SEDIMENTATION RATE] IN BLOOD BY WESTERGREN METHOD: 20 MM/HR (ref 0–20)
GFR SERPL CREATININE-BSD FRML MDRD: >60 ML/MIN/1.73M2
GFR SERPL CREATININE-BSD FRML MDRD: >60 ML/MIN/1.73M2
GLUCOSE BLD-MCNC: 82 MG/DL (ref 70–125)
GLUCOSE SERPL-MCNC: 82 MG/DL (ref 70–125)
HCT VFR BLD AUTO: 35.9 % (ref 35–47)
HCT VFR BLD AUTO: 35.9 % (ref 35–47)
HEMOGLOBIN: 10.8 G/DL (ref 12–16)
HGB BLD-MCNC: 10.8 G/DL (ref 12–16)
LYMPHOCYTES # BLD AUTO: 1.1 THOU/UL (ref 0.8–4.4)
LYMPHOCYTES NFR BLD AUTO: 8 % (ref 20–40)
MCH RBC QN AUTO: 24.5 PG (ref 27–34)
MCH RBC QN AUTO: 24.5 PG (ref 27–34)
MCHC RBC AUTO-ENTMCNC: 30.1 G/DL (ref 32–36)
MCHC RBC AUTO-ENTMCNC: 30.1 G/DL (ref 32–36)
MCV RBC AUTO: 82 FL (ref 80–100)
MCV RBC AUTO: 82 FL (ref 80–100)
MONOCYTES # BLD AUTO: 0.9 THOU/UL (ref 0–0.9)
MONOCYTES NFR BLD AUTO: 7 % (ref 2–10)
NEUTROPHILS # BLD AUTO: 11.4 THOU/UL (ref 2–7.7)
NEUTROPHILS NFR BLD AUTO: 84 % (ref 50–70)
PLATELET # BLD AUTO: 228 THOU/UL (ref 140–440)
PLATELET # BLD AUTO: 228 THOU/UL (ref 140–440)
PMV BLD AUTO: 10 FL (ref 8.5–12.5)
POTASSIUM BLD-SCNC: 4 MMOL/L (ref 3.5–5)
POTASSIUM SERPL-SCNC: 4 MMOL/L (ref 3.5–5)
PROT SERPL-MCNC: 7.2 G/DL (ref 6–8)
PROT SERPL-MCNC: 7.2 G/DL (ref 6–8)
RBC # BLD AUTO: 4.4 MILL/UL (ref 3.8–5.4)
RBC # BLD AUTO: 4.4 MILL/UL (ref 3.8–5.4)
SODIUM SERPL-SCNC: 138 MMOL/L (ref 136–145)
SODIUM SERPL-SCNC: 138 MMOL/L (ref 136–145)
TSH SERPL DL<=0.005 MIU/L-ACNC: 3.34 UIU/ML (ref 0.3–5)
TSH SERPL-ACNC: 3.34 ULU/ML (ref 0.3–5)
WBC # BLD AUTO: 13.6 THOU/UL (ref 4–11)
WBC: 13.6 THOU/UL (ref 4–11)

## 2020-08-14 ENCOUNTER — RECORDS - HEALTHEAST (OUTPATIENT)
Dept: LAB | Facility: CLINIC | Age: 85
End: 2020-08-14

## 2020-08-20 LAB
SARS-COV-2 BY NAA - HISTORICAL: NOT DETECTED
SARS-COV-2 SOURCE - HISTORICAL: NORMAL

## 2020-08-21 ENCOUNTER — RECORDS - HEALTHEAST (OUTPATIENT)
Dept: LAB | Facility: CLINIC | Age: 85
End: 2020-08-21

## 2020-08-25 LAB
C REACTIVE PROTEIN LHE: 0.6 MG/DL (ref 0–0.8)
CREAT SERPL-MCNC: 0.61 MG/DL (ref 0.6–1.1)
ERYTHROCYTE [SEDIMENTATION RATE] IN BLOOD BY WESTERGREN METHOD: 18 MM/HR (ref 0–20)
GFR SERPL CREATININE-BSD FRML MDRD: >60 ML/MIN/1.73M2
WBC: 14.6 THOU/UL (ref 4–11)

## 2020-09-05 ENCOUNTER — RECORDS - HEALTHEAST (OUTPATIENT)
Dept: LAB | Facility: CLINIC | Age: 85
End: 2020-09-05

## 2020-09-08 ENCOUNTER — TRANSFERRED RECORDS (OUTPATIENT)
Dept: HEALTH INFORMATION MANAGEMENT | Facility: CLINIC | Age: 85
End: 2020-09-08

## 2020-09-08 LAB
C REACTIVE PROTEIN LHE: 0.5 MG/DL (ref 0–0.8)
CREAT SERPL-MCNC: 0.59 MG/DL (ref 0.6–1.1)
CREAT SERPL-MCNC: 0.59 MG/DL (ref 0.6–1.1)
ERYTHROCYTE [SEDIMENTATION RATE] IN BLOOD BY WESTERGREN METHOD: 16 MM/HR (ref 0–20)
GFR SERPL CREATININE-BSD FRML MDRD: >60 ML/MIN/1.73M2
GFR SERPL CREATININE-BSD FRML MDRD: >60 ML/MIN/1.73M2
WBC # BLD AUTO: 13.4 THOU/UL (ref 4–11)
WBC: 13.4 THOU/UL (ref 4–11)

## 2020-09-09 ENCOUNTER — RECORDS - HEALTHEAST (OUTPATIENT)
Dept: LAB | Facility: CLINIC | Age: 85
End: 2020-09-09

## 2020-09-14 LAB
SARS-COV-2 BY NAA - HISTORICAL: NOT DETECTED
SARS-COV-2 SOURCE - HISTORICAL: NORMAL

## 2020-11-13 VITALS — HEIGHT: 63 IN | WEIGHT: 123 LBS | BODY MASS INDEX: 21.79 KG/M2

## 2020-11-13 ASSESSMENT — MIFFLIN-ST. JEOR: SCORE: 946.92

## 2020-11-13 NOTE — PROGRESS NOTES
"[place] [facility]  [place] Medical Record Number:  [id number]  Place of Service where encounter took place:  [facility] [[id number]]  Chief Complaint   Patient presents with     Wellness Visit     HPI:    Ila Loredo  is a 89 year old (6/4/1930), who is being seen today for an episodic care visit.  HPI information obtained from: facility chart records, facility staff, patient report and [facility] chart review. Today's concern is:    Left Heel Wound. [facility] RN following. Per patient report, wound is healing well.      Atrial Fibrillation. Upon review of heart rates, 61 on 11/5/20. On 10/25/20, heart rate 130-140s while working with Home Health RN.      Cognitive Impairment. SLUMS 22/30 on 6/15/20. CPT Score 4.0 on 6/29/20.      Hypertension. Blood pressure 138/64 on 11/5/20.     Hypothyroidism. Last TSH 2.10 on 3/12/20. Continue Levothyroxine as ordered.    Leukocytosis. WBC 13.4 on 9/8/20. Afebrile and VSS at the time.     Past Medical and Surgical History reviewed in Epic today.    MEDICATIONS:  Current Outpatient Medications   Medication Sig Dispense Refill     acetaminophen (TYLENOL) 325 MG tablet Take 650 mg by mouth every 4 hours as needed for mild pain       CARTIA  MG 24 hr capsule TAKE 1 CAPSULE BY MOUTH ONCE DAILY 37 capsule 97     levothyroxine (SYNTHROID/LEVOTHROID) 100 MCG tablet TAKE 1 TABLET BY MOUTH ONCE DAILY 31 tablet 97     NYAMYC 895177 UNIT/GM external powder APPLY TOPICALLY TO AFFECTED AREA(S) THREE TIMES DAILY AS NEEDED 60 g 97     polyethylene glycol (MIRALAX) 17 g packet Take 1 packet by mouth as needed for constipation       Wound Dressings (MEDIHONEY WOUND/BURN DRESSING) paste Apply topically daily And PRN 44 mL 97     REVIEW OF SYSTEMS:  4 point ROS including Respiratory, CV, GI and , other than that noted in the HPI,  is negative    Objective:  Ht 1.6 m (5' 2.99\")   Wt 55.8 kg (123 lb)   BMI 21.79 " kg/m    Exam Limited due to COVID-19 Pandemic:  GENERAL APPEARANCE:  Alert, in no distress  ENT:  Mouth and posterior oropharynx normal, moist mucous membranes  EYES:  EOM, conjunctivae, lids, pupils and irises normal  RESP:  no respiratory distress  PSYCH:  oriented to person    Labs:   Labs done in SNF are in [facility]. Please refer to them using [employer]/Care Everywhere.    ASSESSMENT/PLAN:  Leukocytosis with History of Epidural Abscess at T11-L3 S/P I&D on 3/23/20 and Decompression with Bacteremia. Culture positive for Strep species consistent with blood culture positive for Streptococcus Group B. Vancomycin was completed on 3/24/20 with continuation of Ceftriaxone until 5/7/20 then Amoxicillin until 8/1/20. Per ID note on 6/3/20, overall not improving. Not walking. Refuses MRI of lumbar spine, but did get CT of lumbar spine on 6/2/20 with no clear infection or enhancement. VSS. Afebrile. Recommended patient follow-up with Infectious Disease at patient's care conference in the past, but follow-up has not been arranged.      Left Heel Wound. Medihoney applied daily and PRN to heel wound. Coccyx wound healed. [facility] RN following.     Atrial Fibrillation. With RVR during hospitalization 3/21/20 through 4/10/20. Started on Diltiazem during hospitalization. Not a candidate for anticoagulation given frequent falls. Diltiazem last increased on 7/13/20 and again on 7/24/20. Patient and  have declined Cardiology work-up in the past. Monitor heart rate monthly.      Cognitive Impairment. SLUMS 22/30 on 6/15/20. CPT Score 4.0 on 6/29/20.  provides assistance. Resides in [facility].      Hypertension. Monitor blood pressure monthly. Takes Diltiazem for atrial fibrillation as noted above.     Hypothyroidism. Last TSH 2.10 on 3/12/20. Continue Levothyroxine as ordered.     Orders written by provider at facility  None    Electronically signed by:  OPAL Patterson CNP     Annual  "Wellness Visit    Are you in the first 12 months of your Medicare Part B coverage?  No    Physical Health:    In general, how would you rate your overall physical health? good    Outside of work, how many days during the week do you exercise?4-5 days/week    Outside of work, approximately how many minutes a day do you exercise?less than 15 minutes    If you drink alcohol do you typically have >3 drinks per day or >7 drinks per week? No    Do you usually eat at least 4 servings of fruit and vegetables a day, include whole grains & fiber and avoid regularly eating high fat or \"junk\" foods? Yes    Do you have any problems taking medications regularly? No    Do you have any side effects from medications? none    Needs assistance for the following daily activities: transportation, shopping, preparing meals, housework, bathing, laundry, money management and taking medicine    Which of the following safety concerns are present in your home?  none identified     Hearing impairment: No    In the past 6 months, have you been bothered by leaking of urine? yes    Mental Health:    In general, how would you rate your overall mental or emotional health? good    PHQ-2 Score:       PHQ-2 (  [employer]) 2020   Q1: Little interest or pleasure in doing things 0   Q2: Feeling down, depressed or hopeless 1   PHQ-2 Score 1          Do you feel safe in your environment? Yes    Have you ever done Advance Care Planning? (For example, a Health Directive, POLST, or a discussion with a medical provider or your loved ones about your wishes)? Yes, advance care planning is on file. Health Directive    Fall risk:  Fallen 2 or more times in the past year?: No  Any fall with injury in the past year?: No  click delete button to remove this line now  Cognitive Screenin) Repeat 3 items (Leader, Season, Table)   2) Clock draw: ABNORMAL   3) 3 item recall: Recalls NO objects   Results: 0 items recalled: PROBABLE COGNITIVE " IMPAIRMENT    Mini-CogTM Copyright S [place]. Licensed by the author for use in [facility]; reprinted with permission (norman@[facility]). All rights reserved.      Do you have sleep apnea, excessive snoring or daytime drowsiness?: no    Current providers sharing in care for this patient include:   Patient Care Team:  Trinity Singh APRN CNP as PCP - General (Nurse Practitioner - Adult Health)  Trinity Singh APRN CNP as Assigned PCP  Mandie Soriano MD as MD (Internal Medicine)  Champ Frazier as Nursing Assistant (Gerontology)    OPAL Patterson CNP

## 2020-11-18 ENCOUNTER — ASSISTED LIVING VISIT (OUTPATIENT)
Dept: GERIATRICS | Facility: CLINIC | Age: 85
End: 2020-11-18
Payer: COMMERCIAL

## 2020-11-18 DIAGNOSIS — E03.9 HYPOTHYROIDISM, UNSPECIFIED TYPE: ICD-10-CM

## 2020-11-18 DIAGNOSIS — I48.91 ATRIAL FIBRILLATION, UNSPECIFIED TYPE (H): Primary | ICD-10-CM

## 2020-11-18 DIAGNOSIS — D72.829 LEUKOCYTOSIS, UNSPECIFIED TYPE: ICD-10-CM

## 2020-11-18 DIAGNOSIS — S91.302D OPEN WOUND OF LEFT HEEL, SUBSEQUENT ENCOUNTER: ICD-10-CM

## 2020-11-18 DIAGNOSIS — R41.89 COGNITIVE IMPAIRMENT: ICD-10-CM

## 2020-11-18 DIAGNOSIS — I10 BENIGN ESSENTIAL HYPERTENSION: ICD-10-CM

## 2020-11-18 NOTE — LETTER
11/18/2020        RE: Ila Loredo  [facility]  [address] [address]        [facility]  [facility] Record Number:  [id number]  Place of Service where encounter took place:  [facility] [[id number]]  Chief Complaint   Patient presents with     Wellness Visit     HPI:    Ila Loredo  is a 89 year old (6/4/1930), who is being seen today for an episodic care visit.  HPI information obtained from: facility chart records, facility staff, patient report and [facility] chart review. Today's concern is:    Left Heel Wound. [facility] RN following. Per patient report, wound is healing well.      Atrial Fibrillation. Upon review of heart rates, 61 on 11/5/20. On 10/25/20, heart rate 130-140s while working with Home Health RN.      Cognitive Impairment. SLUMS 22/30 on 6/15/20. CPT Score 4.0 on 6/29/20.      Hypertension. Blood pressure 138/64 on 11/5/20.     Hypothyroidism. Last TSH 2.10 on 3/12/20. Continue Levothyroxine as ordered.    Leukocytosis. WBC 13.4 on 9/8/20. Afebrile and VSS at the time.     Past Medical and Surgical History reviewed in Epic today.    MEDICATIONS:  Current Outpatient Medications   Medication Sig Dispense Refill     acetaminophen (TYLENOL) 325 MG tablet Take 650 mg by mouth every 4 hours as needed for mild pain       CARTIA  MG 24 hr capsule TAKE 1 CAPSULE BY MOUTH ONCE DAILY 37 capsule 97     levothyroxine (SYNTHROID/LEVOTHROID) 100 MCG tablet TAKE 1 TABLET BY MOUTH ONCE DAILY 31 tablet 97     NYAMYC 612965 UNIT/GM external powder APPLY TOPICALLY TO AFFECTED AREA(S) THREE TIMES DAILY AS NEEDED 60 g 97     polyethylene glycol (MIRALAX) 17 g packet Take 1 packet by mouth as needed for constipation       Wound Dressings (MEDIHONEY WOUND/BURN DRESSING) paste Apply topically daily And PRN 44 mL 97     REVIEW OF SYSTEMS:  4 point ROS including Respiratory, CV,  "GI and , other than that noted in the HPI,  is negative    Objective:  Ht 1.6 m (5' 2.99\")   Wt 55.8 kg (123 lb)   BMI 21.79 kg/m    Exam Limited due to COVID-19 Pandemic:  GENERAL APPEARANCE:  Alert, in no distress  ENT:  Mouth and posterior oropharynx normal, moist mucous membranes  EYES:  EOM, conjunctivae, lids, pupils and irises normal  RESP:  no respiratory distress  PSYCH:  oriented to person    Labs:   Labs done in SNF are in [facility]. Please refer to them using [facility]/Care Everywhere.    ASSESSMENT/PLAN:  Leukocytosis with History of Epidural Abscess at T11-L3 S/P I&D on 3/23/20 and Decompression with Bacteremia. Culture positive for Strep species consistent with blood culture positive for Streptococcus Group B. Vancomycin was completed on 3/24/20 with continuation of Ceftriaxone until 5/7/20 then Amoxicillin until 8/1/20. Per ID note on 6/3/20, overall not improving. Not walking. Refuses MRI of lumbar spine, but did get CT of lumbar spine on 6/2/20 with no clear infection or enhancement. VSS. Afebrile. Recommended patient follow-up with Infectious Disease at patient's care conference in the past, but follow-up has not been arranged.      Left Heel Wound. Medihoney applied daily and PRN to heel wound. Coccyx wound healed. [facility] RN following.     Atrial Fibrillation. With RVR during hospitalization 3/21/20 through 4/10/20. Started on Diltiazem during hospitalization. Not a candidate for anticoagulation given frequent falls. Diltiazem last increased on 7/13/20 and again on 7/24/20. Patient and  have declined Cardiology work-up in the past. Monitor heart rate monthly.      Cognitive Impairment. SLUMS 22/30 on 6/15/20. CPT Score 4.0 on 6/29/20.  provides assistance. Resides in [facility].      Hypertension. Monitor blood pressure monthly. Takes Diltiazem for atrial fibrillation as noted above.     Hypothyroidism. Last TSH 2.10 on 3/12/20. Continue Levothyroxine as " "ordered.     Orders written by provider at facility  None    Electronically signed by:  OPAL Patterson CNP     Annual Wellness Visit    Are you in the first 12 months of your Medicare Part B coverage?  No    Physical Health:    In general, how would you rate your overall physical health? good    Outside of work, how many days during the week do you exercise?4-5 days/week    Outside of work, approximately how many minutes a day do you exercise?less than 15 minutes    If you drink alcohol do you typically have >3 drinks per day or >7 drinks per week? No    Do you usually eat at least 4 servings of fruit and vegetables a day, include whole grains & fiber and avoid regularly eating high fat or \"junk\" foods? Yes    Do you have any problems taking medications regularly? No    Do you have any side effects from medications? none    Needs assistance for the following daily activities: transportation, shopping, preparing meals, housework, bathing, laundry, money management and taking medicine    Which of the following safety concerns are present in your home?  none identified     Hearing impairment: No    In the past 6 months, have you been bothered by leaking of urine? yes    Mental Health:    In general, how would you rate your overall mental or emotional health? good    PHQ-2 Score:       PHQ-2 (  [employer]) 2020   Q1: Little interest or pleasure in doing things 0   Q2: Feeling down, depressed or hopeless 1   PHQ-2 Score 1          Do you feel safe in your environment? Yes    Have you ever done Advance Care Planning? (For example, a Health Directive, POLST, or a discussion with a medical provider or your loved ones about your wishes)? Yes, advance care planning is on file. Health Directive    Fall risk:  Fallen 2 or more times in the past year?: No  Any fall with injury in the past year?: No  click delete button to remove this line now  Cognitive Screenin) Repeat 3 items (Leader, Season, Table) "   2) Clock draw: ABNORMAL   3) 3 item recall: Recalls NO objects   Results: 0 items recalled: PROBABLE COGNITIVE IMPAIRMENT    Mini-CogTM Copyright BALTAZAR Tapia. Licensed by the author for use in [facility]; reprinted with permission (norman@.[email or web address]). All rights reserved.      Do you have sleep apnea, excessive snoring or daytime drowsiness?: no    Current providers sharing in care for this patient include:   Patient Care Team:  Trinity Singh APRN CNP as PCP - General (Nurse Practitioner - Adult Health)  Trinity Singh APRN CNP as Assigned PCP  Mandie Soriano MD as MD (Internal Medicine)  Champ Frazier as Nursing Assistant (Gerontology)    OPLA Patterson CNP                      Sincerely,        OPAL Patterson CNP

## 2020-11-25 ENCOUNTER — TELEPHONE (OUTPATIENT)
Dept: GERIATRICS | Facility: CLINIC | Age: 85
End: 2020-11-25

## 2020-11-25 DIAGNOSIS — I10 BENIGN ESSENTIAL HYPERTENSION: ICD-10-CM

## 2020-11-25 RX ORDER — DILTIAZEM HYDROCHLORIDE 360 MG/1
360 CAPSULE, EXTENDED RELEASE ORAL DAILY
Start: 2020-11-25 | End: 2021-03-25

## 2020-11-26 NOTE — TELEPHONE ENCOUNTER
[facility] TELEPHONE ENCOUNTER    Chief Complaint   Patient presents with     Tachycardia     Ila Loredo is a 89 year old  (6/4/1930),Nurse communicated today to report: Patient having irregular heart rate from 108-136 while working with therapy. Asymptomatic. All other vital signs stable.    ASSESSMENT/PLAN   Atrial Fibrillation. With RVR during hospitalization 3/21/20 through 4/10/20. Started on Diltiazem during hospitalization. Not a candidate for anticoagulation given frequent falls. Diltiazem last increased on 7/13/20 and again on 7/24/20. Patient and  have declined Cardiology work-up in the past. Will increase Diltiazem to 360 mg PO every day.     Electronically signed by:   OPAL Patterson CNP

## 2020-11-28 ENCOUNTER — RECORDS - HEALTHEAST (OUTPATIENT)
Dept: LAB | Facility: CLINIC | Age: 85
End: 2020-11-28

## 2020-11-30 ENCOUNTER — TRANSFERRED RECORDS (OUTPATIENT)
Dept: HEALTH INFORMATION MANAGEMENT | Facility: CLINIC | Age: 85
End: 2020-11-30

## 2020-11-30 LAB
BASOPHILS # BLD AUTO: 0.1 THOU/UL (ref 0–0.2)
BASOPHILS NFR BLD AUTO: 1 % (ref 0–2)
DIFFERENTIAL: ABNORMAL
EOSINOPHIL # BLD AUTO: 0.2 THOU/UL (ref 0–0.4)
EOSINOPHIL NFR BLD AUTO: 1 % (ref 0–6)
ERYTHROCYTE [DISTWIDTH] IN BLOOD BY AUTOMATED COUNT: 16.7 % (ref 11–14.5)
ERYTHROCYTE [DISTWIDTH] IN BLOOD BY AUTOMATED COUNT: 16.7 % (ref 11–14.5)
HCT VFR BLD AUTO: 36.1 % (ref 35–47)
HCT VFR BLD AUTO: 36.1 % (ref 35–47)
HEMOGLOBIN: 11.4 G/DL (ref 12–16)
HGB BLD-MCNC: 11.4 G/DL (ref 12–16)
IMM GRANULOCYTES # BLD: 0.1 THOU/UL
IMM GRANULOCYTES NFR BLD: 1 %
LYMPHOCYTES # BLD AUTO: 1.1 THOU/UL (ref 0.8–4.4)
LYMPHOCYTES NFR BLD AUTO: 8 % (ref 20–40)
MCH RBC QN AUTO: 28 PG (ref 27–34)
MCH RBC QN AUTO: 28 PG (ref 27–34)
MCHC RBC AUTO-ENTMCNC: 31.6 G/DL (ref 32–36)
MCHC RBC AUTO-ENTMCNC: 31.6 G/DL (ref 32–36)
MCV RBC AUTO: 89 FL (ref 80–100)
MCV RBC AUTO: 89 FL (ref 80–100)
MONOCYTES # BLD AUTO: 0.9 THOU/UL (ref 0–0.9)
MONOCYTES NFR BLD AUTO: 6 % (ref 2–10)
NEUTROPHILS # BLD AUTO: 11.8 THOU/UL (ref 2–7.7)
NEUTROPHILS NFR BLD AUTO: 84 % (ref 50–70)
PLATELET # BLD AUTO: 130 THOU/UL (ref 140–440)
PLATELET # BLD AUTO: 130 THOU/UL (ref 140–440)
PMV BLD AUTO: 10.8 FL (ref 8.5–12.5)
RBC # BLD AUTO: 4.07 MILL/UL (ref 3.8–5.4)
RBC # BLD AUTO: 4.07 MILL/UL (ref 3.8–5.4)
WBC # BLD AUTO: 14.1 THOU/UL (ref 4–11)
WBC: 14.1 THOU/UL (ref 4–11)

## 2020-12-02 ENCOUNTER — ASSISTED LIVING VISIT (OUTPATIENT)
Dept: GERIATRICS | Facility: CLINIC | Age: 85
End: 2020-12-02
Payer: COMMERCIAL

## 2020-12-02 DIAGNOSIS — D69.6 THROMBOCYTOPENIA (H): ICD-10-CM

## 2020-12-02 DIAGNOSIS — S91.302D OPEN WOUND OF LEFT HEEL, SUBSEQUENT ENCOUNTER: ICD-10-CM

## 2020-12-02 DIAGNOSIS — I48.91 ATRIAL FIBRILLATION, UNSPECIFIED TYPE (H): ICD-10-CM

## 2020-12-02 DIAGNOSIS — D72.829 LEUKOCYTOSIS, UNSPECIFIED TYPE: Primary | ICD-10-CM

## 2020-12-02 NOTE — LETTER
12/2/2020        RE: Ila Loredo  [facility]  [address] [address]        [facility]  [facility] Medical Record Number:  [id number]  Place of Service where encounter took place:  [facility] [[id number]]  Chief Complaint   Patient presents with     RECHECK     HPI:    Ila Loredo  is a 89 year old (6/4/1930), who is being seen today for an episodic care visit.  HPI information obtained from: facility chart records, facility staff, patient report, [facility] chart review and family/first contact , Negro report. Today's concern is:    Leukocytosis. WBC 14.1 on 11/30/20. Previous WBC 13.4 on 9/8/20.    Thrombocytopenia. Platelet Count 130 on 11/30/20. Previous Platelet Count 228 on 8/11/20.     Atrial Fibrillation. On 11/25/20, staff reported patient having irregular heart rate from 108-136 while working with therapy. Asymptomatic. All other vital signs stable.Increased Diltiazem to 360 mg at that time. Upon review of documentation, did not receive increased dosed of Diltiazem until 12/1/20.     Left Heel Wound. Per Home Health RN, left wound is healed.    Past Medical and Surgical History reviewed in Epic today.    MEDICATIONS:  Current Outpatient Medications   Medication Sig Dispense Refill     acetaminophen (TYLENOL) 325 MG tablet Take 650 mg by mouth every 4 hours as needed for mild pain       diltiazem ER COATED BEADS (CARDIZEM CD) 360 MG 24 hr capsule Take 1 capsule (360 mg) by mouth daily       levothyroxine (SYNTHROID/LEVOTHROID) 100 MCG tablet TAKE 1 TABLET BY MOUTH ONCE DAILY 31 tablet 97     NYAMYC 000648 UNIT/GM external powder APPLY TOPICALLY TO AFFECTED AREA(S) THREE TIMES DAILY AS NEEDED 60 g 97     polyethylene glycol (MIRALAX) 17 g packet Take 1 packet by mouth as needed for constipation       Wound Dressings (MEDIHONEY WOUND/BURN DRESSING) paste Apply topically daily And  "PRN 44 mL 97     REVIEW OF SYSTEMS:  4 point ROS including Respiratory, CV, GI and , other than that noted in the HPI,  is negative    Objective:  Ht 1.6 m (5' 2.99\")   Wt 55.8 kg (123 lb)   BMI 21.80 kg/m    Exam Limited due to COVID-19 Pandemic:  GENERAL APPEARANCE:  Alert, in no distress  ENT:  Mouth and posterior oropharynx normal, moist mucous membranes  EYES:  EOM, conjunctivae, lids, pupils and irises normal  RESP:  no respiratory distress  PSYCH:  affect and mood normal    Labs:   Labs done in SNF are in [place] EPIC. Please refer to them using [employer]/Care Everywhere.    ASSESSMENT/PLAN:  Leukocytosis with History of Epidural Abscess at T11-L3 S/P I&D on 3/23/20 and Decompression with Bacteremia. Culture positive for Strep species consistent with blood culture positive for Streptococcus Group B. Vancomycin was completed on 3/24/20 with continuation of Ceftriaxone until 5/7/20 then Amoxicillin until 8/1/20. Per ID, Dr. Coffey, note on 6/3/20, overall not improving. Refuses MRI of lumbar spine, but did get CT of lumbar spine on 6/2/20 with no clear infection or enhancement. Peripheral smear on 3/20/20 favored reactive. Afebrile. Recommended patient follow-up with Infectious Disease at patient's care conference in the past, but follow-up has not been arranged. During today's visit, educated patient and  on elevated WBC and possible etiology such as infection or inflammation. Discussed further work-up including labs and follow-up with ID. Patient and  report that is not necessary. Would be in agreement with focusing on comfort versus further work-up.  reports it is too difficult to take patient out to appointments.  requests other family members not be contacted.     Thrombocytopenia. New. Unclear etiology. Is not on anticoagulant given falls risk. As patient and  elect to focus on comfort as noted above, will not pursue further work-up at this time.    Atrial " Fibrillation. With RVR during hospitalization 3/21/20 through 4/10/20. Started on Diltiazem during hospitalization. Not a candidate for anticoagulation given frequent falls. Diltiazem last increased on 7/13/20, 7/24/20 and most recently, on 12/1/20.  Patient and  have declined Cardiology work-up in the past.Given there are no recent heart rates on file, will request staff check heart rate daily x 5.    Left Heel Wound. Healed per Home Health RN. Apply Aquaphor daily to help scab to soften. Float heels while in bed.    Orders written by provider at facility  None    Electronically signed by:  OPAL Patterson CNP             Sincerely,        OPAL Patterson CNP

## 2020-12-04 VITALS — BODY MASS INDEX: 21.79 KG/M2 | HEIGHT: 63 IN | WEIGHT: 123 LBS

## 2020-12-04 ASSESSMENT — MIFFLIN-ST. JEOR: SCORE: 946.89

## 2020-12-04 NOTE — PROGRESS NOTES
[facility]  [facility] Medical Record Number:  [id number]  Place of Service where encounter took place:  [facility] ([facility]) [[id number]]  Chief Complaint   Patient presents with     RECHECK     HPI:    Ila Loredo  is a 89 year old (6/4/1930), who is being seen today for an episodic care visit.  HPI information obtained from: facility chart records, facility staff, patient report, [facility] chart review and family/first contact , Negro report. Today's concern is:    Leukocytosis. WBC 14.1 on 11/30/20. Previous WBC 13.4 on 9/8/20.    Thrombocytopenia. Platelet Count 130 on 11/30/20. Previous Platelet Count 228 on 8/11/20.     Atrial Fibrillation. On 11/25/20, staff reported patient having irregular heart rate from 108-136 while working with therapy. Asymptomatic. All other vital signs stable.Increased Diltiazem to 360 mg at that time. Upon review of documentation, did not receive increased dosed of Diltiazem until 12/1/20.     Left Heel Wound. Per Home Health RN, left wound is healed.    Past Medical and Surgical History reviewed in Epic today.    MEDICATIONS:  Current Outpatient Medications   Medication Sig Dispense Refill     acetaminophen (TYLENOL) 325 MG tablet Take 650 mg by mouth every 4 hours as needed for mild pain       diltiazem ER COATED BEADS (CARDIZEM CD) 360 MG 24 hr capsule Take 1 capsule (360 mg) by mouth daily       levothyroxine (SYNTHROID/LEVOTHROID) 100 MCG tablet TAKE 1 TABLET BY MOUTH ONCE DAILY 31 tablet 97     NYAMYC 418341 UNIT/GM external powder APPLY TOPICALLY TO AFFECTED AREA(S) THREE TIMES DAILY AS NEEDED 60 g 97     polyethylene glycol (MIRALAX) 17 g packet Take 1 packet by mouth as needed for constipation       Wound Dressings (MEDIHONEY WOUND/BURN DRESSING) paste Apply topically daily And PRN 44 mL 97     REVIEW OF SYSTEMS:  4 point ROS including Respiratory, CV, GI and , other than that noted in the HPI,  is  "negative    Objective:  Ht 1.6 m (5' 2.99\")   Wt 55.8 kg (123 lb)   BMI 21.80 kg/m    Exam Limited due to COVID-19 Pandemic:  GENERAL APPEARANCE:  Alert, in no distress  ENT:  Mouth and posterior oropharynx normal, moist mucous membranes  EYES:  EOM, conjunctivae, lids, pupils and irises normal  RESP:  no respiratory distress  PSYCH:  affect and mood normal    Labs:   Labs done in SNF are in [place] EPIC. Please refer to them using EPIC/Care Everywhere.    ASSESSMENT/PLAN:  Leukocytosis with History of Epidural Abscess at T11-L3 S/P I&D on 3/23/20 and Decompression with Bacteremia. Culture positive for Strep species consistent with blood culture positive for Streptococcus Group B. Vancomycin was completed on 3/24/20 with continuation of Ceftriaxone until 5/7/20 then Amoxicillin until 8/1/20. Per ID, Dr. Coffey, note on 6/3/20, overall not improving. Refuses MRI of lumbar spine, but did get CT of lumbar spine on 6/2/20 with no clear infection or enhancement. Peripheral smear on 3/20/20 favored reactive. Afebrile. Recommended patient follow-up with Infectious Disease at patient's care conference in the past, but follow-up has not been arranged. During today's visit, educated patient and  on elevated WBC and possible etiology such as infection or inflammation. Discussed further work-up including labs and follow-up with ID. Patient and  report that is not necessary. Would be in agreement with focusing on comfort versus further work-up.  reports it is too difficult to take patient out to appointments.  requests other family members not be contacted.     Thrombocytopenia. New. Unclear etiology. Is not on anticoagulant given falls risk. As patient and  elect to focus on comfort as noted above, will not pursue further work-up at this time.    Atrial Fibrillation. With RVR during hospitalization 3/21/20 through 4/10/20. Started on Diltiazem during hospitalization. Not a candidate for " anticoagulation given frequent falls. Diltiazem last increased on 7/13/20, 7/24/20 and most recently, on 12/1/20.  Patient and  have declined Cardiology work-up in the past.Given there are no recent heart rates on file, will request staff check heart rate daily x 5.    Left Heel Wound. Healed per Home Health RN. Apply Aquaphor daily to help scab to soften. Float heels while in bed.    Orders written by provider at facility  None    Electronically signed by:  OPAL Patterson CNP

## 2020-12-14 DIAGNOSIS — I48.91 ATRIAL FIBRILLATION, UNSPECIFIED TYPE (H): Primary | ICD-10-CM

## 2020-12-15 RX ORDER — DILTIAZEM HYDROCHLORIDE 360 MG/1
CAPSULE, EXTENDED RELEASE ORAL
Qty: 28 CAPSULE | Status: SHIPPED | OUTPATIENT
Start: 2020-12-15

## 2021-02-04 ENCOUNTER — RECORDS - HEALTHEAST (OUTPATIENT)
Dept: LAB | Facility: CLINIC | Age: 86
End: 2021-02-04

## 2021-02-04 LAB
SARS-COV-2 PCR COMMENT: NORMAL
SARS-COV-2 RNA SPEC QL NAA+PROBE: NEGATIVE
SARS-COV-2 VIRUS SPECIMEN SOURCE: NORMAL

## 2021-03-18 ENCOUNTER — TELEPHONE (OUTPATIENT)
Dept: GERIATRICS | Facility: CLINIC | Age: 86
End: 2021-03-18

## 2021-03-18 NOTE — TELEPHONE ENCOUNTER
"[facility] BRIDGE COMMUNICATION DOCUMENTATION ENCOUNTER    Ila Loredo is a 89 year old  (6/4/1930), Nurse messaged [facility] via the \"Bridge\" today to report:     03/18/2021 4:17:14 PM - By: Magalie Burrell       RESIDENT HAS BEEN ADMITTED WITH FRACTURE    S: Witnessed fall at approx. 12:45pm  B: RA was in resident s apartment at time of fall. Resident was sitting in her wheelchair and having a disagreement with her  about going on a walk as resident did not want to and  was suggesting to do so. RA was behind  as resident was originally going to be transferred into her recliner chair. Resident got up from her WC, not alerting RA she was going to do so, grabbed her walker and proceeded to turn around, at that time resident lost balance and fell. Resident denied any dizziness upon standing. RA s obtained vital signs and called nurse. Vitals are as follows: /65 P 87 RR 17 O2 97% RA T 97.7.   A: Resident was lying on her right side, on the floor in her living room when writer arrived. Resident was alert and responding at baseline. Resident stated that she fell trying to get from her wheelchair to her walker to go for a walk, when she fell. Resident has a large golf ball sized lump on the back of her head. It is not causing her pain. Writer asked resident if she could move her legs and she was unable to move her left leg without pain. She tried to lay on her back which increased her pain in her left hip/buttocks area. Her left leg was internally rotated. She was wearing her pendent and blue grippy socks at the time of the fall. Resident reported her pain at rest at a 5/10 and increased to an 7-8/10 with movement.  R: Writer called 911 at 1:04pm and resident was taken to [facility]. Resident s  was present at time of fall and is meeting resident at hospital.  was calling his children during nursing visit to update them. "     ASSESSMENT/PLAN    Message sent to provider     No response required    Electronically signed by:   Whitney Stroud RN

## 2021-03-25 VITALS
OXYGEN SATURATION: 95 % | RESPIRATION RATE: 16 BRPM | DIASTOLIC BLOOD PRESSURE: 88 MMHG | WEIGHT: 154 LBS | HEART RATE: 96 BPM | BODY MASS INDEX: 27.29 KG/M2 | SYSTOLIC BLOOD PRESSURE: 133 MMHG | TEMPERATURE: 98.7 F

## 2021-03-25 DIAGNOSIS — Z53.9 DIAGNOSIS NOT YET DEFINED: Primary | ICD-10-CM

## 2021-03-25 PROBLEM — D69.6 THROMBOCYTOPENIA (H): Status: ACTIVE | Noted: 2021-03-25

## 2021-03-25 PROBLEM — D62 ANEMIA DUE TO BLOOD LOSS, ACUTE: Status: ACTIVE | Noted: 2021-03-25

## 2021-03-25 PROBLEM — I48.91 ATRIAL FIBRILLATION, UNSPECIFIED TYPE (H): Status: ACTIVE | Noted: 2021-03-25

## 2021-03-25 PROBLEM — S72.002B: Status: ACTIVE | Noted: 2021-03-25

## 2021-03-25 PROBLEM — Z87.81 S/P LEFT HIP FRACTURE: Status: ACTIVE | Noted: 2021-03-25

## 2021-03-25 PROBLEM — E03.9 HYPOTHYROIDISM, UNSPECIFIED TYPE: Status: ACTIVE | Noted: 2021-03-25

## 2021-03-25 PROCEDURE — G0179 MD RECERTIFICATION HHA PT: HCPCS | Performed by: INTERNAL MEDICINE

## 2021-03-25 NOTE — PROGRESS NOTES
[facility]  PRIMARY CARE PROVIDER AND CLINIC:  Trinity Singh, APRN CNP, [address] / [place] [address]  Chief Complaint   Patient presents with     Hospital F/U     [facility] Medical Record Number:  [id number]  Place of Service where encounter took place:  [facility] ([facility]) [[id number]]    Ila Loredo  is a 89 year old  (6/4/1930), admitted to the above facility from  [facility]. Hospital stay 3/18/21 through 3/24/21..  Admitted to this facility for  rehab, medical management and nursing care.    HPI:    HPI information obtained from: facility chart records, facility staff, patient report and [facility] chart review.   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 Fib  Suspect delirium, improving  Acute blood loss anemia due to surgery, stable    The patient presented with left femoral neck fracture, underwent left hip hemiarthroplasty and bipolar implantation, postoperatively the patient had mild episode of delirium treated with melatonin, Hgb decreased from her baseline after surgery but stable not requiring transfusion, hemodynamically stable prior to discharge.     Updates on Status Since Skilled nursing Admission: Patient seen in room, mild cognitive limitations, poor historian, is WBAT, mild to moderate pain in L leg/incision, dressing dry and clean, L hip to lower leg firm lymphedema, minimal pain with palpation, concerned was not discharged with lovenox as stated by orthopedics, did receive lovenox in hospital 3/22-24, Hgb post-op was 7.0 and transfused 2u PRBC's, incontinent bowel/bladder, pleasant, no distress.    CODE STATUS/ADVANCE DIRECTIVES DISCUSSION:   CPR/Full code   Patient's living condition: lives in an assisted living facility  ALLERGIES:  Codeine  PAST MEDICAL HISTORY:  has no past medical history on file.  PAST SURGICAL HISTORY:   has no past surgical history on file.  FAMILY HISTORY: family history is not on file.  SOCIAL HISTORY:       Post Discharge Medication Reconciliation Status: discharge medications reconciled and changed, per note/orders    Current Outpatient Medications   Medication Sig Dispense Refill     acetaminophen (TYLENOL) 500 MG tablet Take 2 tablets (1,000 mg) by mouth 3 times daily 90 tablet 0     diltiazem ER (TIAZAC) 360 MG 24 hr ER beaded capsule TAKE 1 CAPSULE BY MOUTH ONCE DAILY 28 capsule PRN     enoxaparin ANTICOAGULANT (LOVENOX) 40 MG/0.4ML syringe Inject 0.4 mLs (40 mg) Subcutaneous daily for 21 days 8.4 mL 0     ibuprofen (ADVIL/MOTRIN) 400 MG tablet Take 1 tablet (400 mg) by mouth every 8 hours as needed for moderate pain 90 tablet 0     levothyroxine (SYNTHROID/LEVOTHROID) 100 MCG tablet TAKE 1 TABLET BY MOUTH ONCE DAILY 31 tablet 97     NYAMYC [id number] UNIT/GM external powder APPLY TOPICALLY TO AFFECTED AREA(S) THREE TIMES DAILY AS NEEDED 60 g 97     polyethylene glycol (MIRALAX) 17 g packet Take 1 packet by mouth as needed for constipation       Wound Dressings (MEDIHONEY WOUND/BURN DRESSING) paste Apply topically daily And PRN 44 mL 97         ROS:  4 point ROS including Respiratory, CV, GI and , other than that noted in the HPI,  is negative    Vitals:  /88   Pulse 96   Temp 98.7  F (37.1  C)   Resp 16   Wt 69.9 kg (154 lb)   SpO2 95%   BMI 27.29 kg/m    Exam:  GENERAL APPEARANCE:  in no distress, appears healthy  ENT:  Mouth and posterior oropharynx normal, moist mucous membranes  RESP:  lungs clear to auscultation   CV:  peripheral edema 3-4+ in L leg, rate-normal  ABDOMEN:  bowel sounds normal  M/S:   Gait and station abnormal WC mobility  SKIN:  Inspection of skin and subcutaneous tissue baseline  NEURO:   Examination of sensation by touch normal  PSYCH:  oriented X 3, memory impaired      Lab/Diagnostic data:  Recent labs in [facility] reviewed by me today.     ASSESSMENT/PLAN:  Type I or II open displaced fracture of left femoral neck (H)  Lymphedema of left leg  S/p left hip fracture  -appears to have tolerated procedure well, post-op delirium improving  -CBC, BMP, TSH on 3/29  -PT/OT to eval and treat  -change APAP to 1000mg TID scheduled  -ibuprofen 400mg TID PRN  -restart lovenox 40mg daily x21 days  -lymph eval today; tubigrips+velcro wraps and massage for thigh      Atrial fibrillation, unspecified type (H)  -chronic, not anticoagulated  -is falls risk  -thrombocytopenia, bleeding risk, no intervention    Thrombocytopenia (H)  -recent plat count 130  -follow up CBC on 3/29    Anemia due to blood loss, acute  -Hgb post op 7.0, transfused  -currently asympotomatic, Hgb 9/6 at discharge  -normal Hgb in the 11 range  -Hgb on 3/29    Hypothyroidism, unspecified type  -TSH on 8/11 was 3.34  -continue levothyroxine 100mg   -recheck TSH on 3/29           Electronically signed by:  OPAL Burton CNP

## 2021-03-26 ENCOUNTER — NURSING HOME VISIT (OUTPATIENT)
Dept: GERIATRICS | Facility: CLINIC | Age: 86
End: 2021-03-26
Payer: COMMERCIAL

## 2021-03-26 DIAGNOSIS — D69.6 THROMBOCYTOPENIA (H): ICD-10-CM

## 2021-03-26 DIAGNOSIS — Z87.81 S/P LEFT HIP FRACTURE: ICD-10-CM

## 2021-03-26 DIAGNOSIS — D62 ANEMIA DUE TO BLOOD LOSS, ACUTE: ICD-10-CM

## 2021-03-26 DIAGNOSIS — I48.91 ATRIAL FIBRILLATION, UNSPECIFIED TYPE (H): ICD-10-CM

## 2021-03-26 DIAGNOSIS — E03.9 HYPOTHYROIDISM, UNSPECIFIED TYPE: ICD-10-CM

## 2021-03-26 DIAGNOSIS — S72.002B: Primary | ICD-10-CM

## 2021-03-26 DIAGNOSIS — I89.0 LYMPHEDEMA OF LEFT LEG: ICD-10-CM

## 2021-03-26 PROCEDURE — 99309 SBSQ NF CARE MODERATE MDM 30: CPT | Performed by: NURSE PRACTITIONER

## 2021-03-26 RX ORDER — ACETAMINOPHEN 500 MG
1000 TABLET ORAL 3 TIMES DAILY
Qty: 90 TABLET | Refills: 0
Start: 2021-03-26 | End: 2021-05-04

## 2021-03-26 RX ORDER — IBUPROFEN 400 MG/1
400 TABLET, FILM COATED ORAL EVERY 8 HOURS PRN
Qty: 90 TABLET | Refills: 0
Start: 2021-03-26 | End: 2021-04-18

## 2021-03-26 NOTE — LETTER
3/26/2021        RE: Ila Loredo  [facility]  [address]  [address]  [place] [address]        [place] [facility]  PRIMARY CARE PROVIDER AND CLINIC:  Trinity Singh, OPAL CNP, [address] [facility] [address] / [place] [address]  Chief Complaint   Patient presents with     Hospital F/U     [place] Medical Record Number:  [id number]  Place of Service where encounter took place:  [facility] [[id number]]    Ila Loredo  is a 89 year old  (6/4/1930), admitted to the above facility from  [facility]. Hospital stay 3/18/21 through 3/24/21..  Admitted to this facility for  rehab, medical management and nursing care.    HPI:    HPI information obtained from: facility chart records, facility staff, patient report and [facility] chart review.   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 Fib  Suspect delirium, improving  Acute blood loss anemia due to surgery, stable    The patient presented with left femoral neck fracture, underwent left hip hemiarthroplasty and bipolar implantation, postoperatively the patient had mild episode of delirium treated with melatonin, Hgb decreased from her baseline after surgery but stable not requiring transfusion, hemodynamically stable prior to discharge.     Updates on Status Since Skilled nursing Admission: Patient seen in room, mild cognitive limitations, poor historian, is WBAT, mild to moderate pain in L leg/incision, dressing dry and clean, L hip to lower leg firm lymphedema, minimal pain with palpation, concerned was not discharged with lovenox as stated by orthopedics, did receive lovenox in hospital 3/22-24, Hgb post-op was 7.0 and transfused 2u PRBC's, incontinent bowel/bladder, pleasant, no distress.    CODE  STATUS/ADVANCE DIRECTIVES DISCUSSION:   CPR/Full code   Patient's living condition: lives in an assisted living facility  ALLERGIES: Codeine  PAST MEDICAL HISTORY:  has no past medical history on file.  PAST SURGICAL HISTORY:   has no past surgical history on file.  FAMILY HISTORY: family history is not on file.  SOCIAL HISTORY:       Post Discharge Medication Reconciliation Status: discharge medications reconciled and changed, per note/orders    Current Outpatient Medications   Medication Sig Dispense Refill     acetaminophen (TYLENOL) 500 MG tablet Take 2 tablets (1,000 mg) by mouth 3 times daily 90 tablet 0     diltiazem ER (TIAZAC) 360 MG 24 hr ER beaded capsule TAKE 1 CAPSULE BY MOUTH ONCE DAILY 28 capsule PRN     enoxaparin ANTICOAGULANT (LOVENOX) 40 MG/0.4ML syringe Inject 0.4 mLs (40 mg) Subcutaneous daily for 21 days 8.4 mL 0     ibuprofen (ADVIL/MOTRIN) 400 MG tablet Take 1 tablet (400 mg) by mouth every 8 hours as needed for moderate pain 90 tablet 0     levothyroxine (SYNTHROID/LEVOTHROID) 100 MCG tablet TAKE 1 TABLET BY MOUTH ONCE DAILY 31 tablet 97     NYAMYC 319858 UNIT/GM external powder APPLY TOPICALLY TO AFFECTED AREA(S) THREE TIMES DAILY AS NEEDED 60 g 97     polyethylene glycol (MIRALAX) 17 g packet Take 1 packet by mouth as needed for constipation       Wound Dressings (MEDIHONEY WOUND/BURN DRESSING) paste Apply topically daily And PRN 44 mL 97         ROS:  4 point ROS including Respiratory, CV, GI and , other than that noted in the HPI,  is negative    Vitals:  /88   Pulse 96   Temp 98.7  F (37.1  C)   Resp 16   Wt 69.9 kg (154 lb)   SpO2 95%   BMI 27.29 kg/m    Exam:  GENERAL APPEARANCE:  in no distress, appears healthy  ENT:  Mouth and posterior oropharynx normal, moist mucous membranes  RESP:  lungs clear to auscultation   CV:  peripheral edema 3-4+ in L leg, rate-normal  ABDOMEN:  bowel sounds normal  M/S:   Gait and station abnormal WC mobility  SKIN:  Inspection of skin and  subcutaneous tissue baseline  NEURO:   Examination of sensation by touch normal  PSYCH:  oriented X 3, memory impaired     Lab/Diagnostic data:  Recent labs in [facility] reviewed by me today.     ASSESSMENT/PLAN:  Type I or II open displaced fracture of left femoral neck (H)  Lymphedema of left leg  S/p left hip fracture  -appears to have tolerated procedure well, post-op delirium improving  -CBC, BMP, TSH on 3/29  -PT/OT to eval and treat  -change APAP to 1000mg TID scheduled  -ibuprofen 400mg TID PRN  -restart lovenox 40mg daily x21 days  -lymph eval today; tubigrips+velcro wraps and massage for thigh      Atrial fibrillation, unspecified type (H)  -chronic, not anticoagulated  -is falls risk  -thrombocytopenia, bleeding risk, no intervention    Thrombocytopenia (H)  -recent plat count 130  -follow up CBC on 3/29    Anemia due to blood loss, acute  -Hgb post op 7.0, transfused  -currently asympotomatic, Hgb 9/6 at discharge  -normal Hgb in the 11 range  -Hgb on 3/29    Hypothyroidism, unspecified type  -TSH on 8/11 was 3.34  -continue levothyroxine 100mg   -recheck TSH on 3/29           Electronically signed by:  OPAL Burton CNP                           Sincerely,        OPAL Burton CNP

## 2021-03-28 VITALS
HEART RATE: 118 BPM | TEMPERATURE: 97.8 F | BODY MASS INDEX: 27.47 KG/M2 | DIASTOLIC BLOOD PRESSURE: 67 MMHG | WEIGHT: 155 LBS | RESPIRATION RATE: 16 BRPM | SYSTOLIC BLOOD PRESSURE: 115 MMHG | OXYGEN SATURATION: 94 %

## 2021-03-29 ENCOUNTER — TRANSFERRED RECORDS (OUTPATIENT)
Dept: HEALTH INFORMATION MANAGEMENT | Facility: CLINIC | Age: 86
End: 2021-03-29

## 2021-03-29 ENCOUNTER — NURSING HOME VISIT (OUTPATIENT)
Dept: GERIATRICS | Facility: CLINIC | Age: 86
End: 2021-03-29
Payer: COMMERCIAL

## 2021-03-29 DIAGNOSIS — I89.0 LYMPHEDEMA OF LEFT LEG: ICD-10-CM

## 2021-03-29 DIAGNOSIS — S72.002B: ICD-10-CM

## 2021-03-29 DIAGNOSIS — I48.91 ATRIAL FIBRILLATION, UNSPECIFIED TYPE (H): Primary | ICD-10-CM

## 2021-03-29 LAB
ANION GAP SERPL CALCULATED.3IONS-SCNC: 8 MMOL/L (ref 7–16)
BUN SERPL-MCNC: 10 MG/DL (ref 7–26)
CALCIUM SERPL-MCNC: 7.9 MG/DL (ref 8.4–10.4)
CHLORIDE SERPLBLD-SCNC: 109 MMOL/L (ref 98–109)
CO2 SERPL-SCNC: 23 MMOL/L (ref 20–29)
CREAT SERPL-MCNC: 0.58 MG/DL (ref 0.55–1.02)
DIFFERENTIAL: ABNORMAL
ERYTHROCYTE [DISTWIDTH] IN BLOOD BY AUTOMATED COUNT: 17.7 % (ref 11.9–15.5)
GFR SERPL CREATININE-BSD FRML MDRD: >60 ML/MIN/1.73M2
GLUCOSE SERPL-MCNC: 75 MG/DL (ref 70–100)
HCT VFR BLD AUTO: 24.8 % (ref 34.9–44.5)
HEMOGLOBIN: 7.7 G/DL (ref 12–15.5)
MCH RBC QN AUTO: 28.8 PG (ref 27.6–33.3)
MCHC RBC AUTO-ENTMCNC: 31 G/DL (ref 31.5–35.2)
MCV RBC AUTO: 92.9 FL (ref 80–100)
PLATELET # BLD AUTO: 169 10(9)/L (ref 150–450)
POTASSIUM SERPL-SCNC: 3.8 MMOL/L (ref 3.5–5.1)
RBC # BLD AUTO: 2.67 10(12)/L (ref 3.9–5)
SODIUM SERPL-SCNC: 140 MMOL/L (ref 136–145)
TSH SERPL-ACNC: 7.38 UIU/ML (ref 0.3–4.5)
WBC # BLD AUTO: 17.7 10(9)/L (ref 3.5–10.5)

## 2021-03-29 PROCEDURE — 99309 SBSQ NF CARE MODERATE MDM 30: CPT | Performed by: NURSE PRACTITIONER

## 2021-03-29 NOTE — LETTER
3/29/2021        RE: Ila Loredo  [facility]  [address] [address]        [place] [facility]  [place] Medical Record Number:  [id number]  [id number]  Chief Complaint   Patient presents with     RECHECK       HPI:    Ila Loredo  is a 89 year old (6/4/1930), who is being seen today for an episodic care visit.  HPI information obtained from: facility chart records, facility staff and patient report. Today's concern is:     Atrial fibrillation, unspecified type (H)  Type I or II open displaced fracture of left femoral neck (H)  Lymphedema of left leg     Patient seen today for follow up, moderate cognitive limitations, poor historian, pleasant, not on anticoagulation for afib due to fall/bleeding risk, L leg from buttock to ankle still firm edema, incision dressing intact and dry, denies pain.    Past Medical and Surgical History reviewed in Epic today.    MEDICATIONS:    Current Outpatient Medications   Medication Sig Dispense Refill     acetaminophen (TYLENOL) 500 MG tablet Take 2 tablets (1,000 mg) by mouth 3 times daily 90 tablet 0     diltiazem ER (TIAZAC) 360 MG 24 hr ER beaded capsule TAKE 1 CAPSULE BY MOUTH ONCE DAILY 28 capsule PRN     enoxaparin ANTICOAGULANT (LOVENOX) 40 MG/0.4ML syringe Inject 0.4 mLs (40 mg) Subcutaneous daily for 21 days 8.4 mL 0     ibuprofen (ADVIL/MOTRIN) 400 MG tablet Take 1 tablet (400 mg) by mouth every 8 hours as needed for moderate pain 90 tablet 0     levothyroxine (SYNTHROID/LEVOTHROID) 100 MCG tablet TAKE 1 TABLET BY MOUTH ONCE DAILY 31 tablet 97     NYAMYC 236650 UNIT/GM external powder APPLY TOPICALLY TO AFFECTED AREA(S) THREE TIMES DAILY AS NEEDED 60 g 97     polyethylene glycol (MIRALAX) 17 g packet Take 1 packet by mouth as needed for constipation       Wound Dressings ([facility]/BURN [facility]ESSING) paste Apply topically daily  And PRN 44 mL 97       REVIEW OF SYSTEMS:  4 point ROS including Respiratory, CV, GI and , other than that noted in the HPI,  is negative    Objective:  /67   Pulse 118   Temp 97.8  F (36.6  C)   Resp 16   Wt 70.3 kg (155 lb)   SpO2 94%   BMI 27.47 kg/m    Exam:  GENERAL APPEARANCE:  in no distress, appears healthy  ENT:  Mouth and posterior oropharynx normal, moist mucous membranes  RESP:  lungs clear to auscultation   CV:  peripheral edema 2-3+ in left extremity, irregular rhythm rate  ABDOMEN:  bowel sounds normal  M/S:   Gait and station abnormal transfer assist  SKIN:  Inspection of skin and subcutaneous tissue baseline  NEURO:   Examination of sensation by touch normal  PSYCH:  oriented X 3, memory impaired     Labs:   Recent labs in [facility] reviewed by me today.     ASSESSMENT/PLAN:  Atrial fibrillation, unspecified type (H)  Type I or II open displaced fracture of left femoral neck (H)  Lymphedema of left leg  -continue diltiazem for afib control  -restarted lovenox 40mg daily x21 days  -PT/OT/ST to eval  -lymphedema for L upper leg  -continue tubigrip and lower leg velcro  -continue ibuprofen PRN, probably would not tolerate opioids  -CBC, BMP, TSH today pending results         Electronically signed by:  OPAL Burton CNP                 Sincerely,        OPAL Burton CNP

## 2021-03-29 NOTE — PROGRESS NOTES
[place] [facility]  [place] Medical Record Number:  [id number]  Place of Service where encounter took place:  [facility] ([facility]) [[id number]]  Chief Complaint   Patient presents with     RECHECK       HPI:    Ila Loredo  is a 89 year old (6/4/1930), who is being seen today for an episodic care visit.  HPI information obtained from: facility chart records, facility staff and patient report. Today's concern is:     Atrial fibrillation, unspecified type (H)  Type I or II open displaced fracture of left femoral neck (H)  Lymphedema of left leg     Patient seen today for follow up, moderate cognitive limitations, poor historian, pleasant, not on anticoagulation for afib due to fall/bleeding risk, L leg from buttock to ankle still firm edema, incision dressing intact and dry, denies pain.    Past Medical and Surgical History reviewed in Epic today.    MEDICATIONS:    Current Outpatient Medications   Medication Sig Dispense Refill     acetaminophen (TYLENOL) 500 MG tablet Take 2 tablets (1,000 mg) by mouth 3 times daily 90 tablet 0     diltiazem ER (TIAZAC) 360 MG 24 hr ER beaded capsule TAKE 1 CAPSULE BY MOUTH ONCE DAILY 28 capsule PRN     enoxaparin ANTICOAGULANT (LOVENOX) 40 MG/0.4ML syringe Inject 0.4 mLs (40 mg) Subcutaneous daily for 21 days 8.4 mL 0     ibuprofen (ADVIL/MOTRIN) 400 MG tablet Take 1 tablet (400 mg) by mouth every 8 hours as needed for moderate pain 90 tablet 0     levothyroxine (SYNTHROID/LEVOTHROID) 100 MCG tablet TAKE 1 TABLET BY MOUTH ONCE DAILY 31 tablet 97     NYAMYC 734841 UNIT/GM external powder APPLY TOPICALLY TO AFFECTED AREA(S) THREE TIMES DAILY AS NEEDED 60 g 97     polyethylene glycol (MIRALAX) 17 g packet Take 1 packet by mouth as needed for constipation       Wound Dressings (MEDIHONEY WOUND/BURN DRESSING) paste Apply topically daily And PRN 44 mL 97       REVIEW OF SYSTEMS:  4 point ROS including Respiratory, CV, GI and , other than that noted in the HPI,   is negative    Objective:  /67   Pulse 118   Temp 97.8  F (36.6  C)   Resp 16   Wt 70.3 kg (155 lb)   SpO2 94%   BMI 27.47 kg/m    Exam:  GENERAL APPEARANCE:  in no distress, appears healthy  ENT:  Mouth and posterior oropharynx normal, moist mucous membranes  RESP:  lungs clear to auscultation   CV:  peripheral edema 2-3+ in left extremity, irregular rhythm rate  ABDOMEN:  bowel sounds normal  M/S:   Gait and station abnormal transfer assist  SKIN:  Inspection of skin and subcutaneous tissue baseline  NEURO:   Examination of sensation by touch normal  PSYCH:  oriented X 3, memory impaired     Labs:   Recent labs in [facility] reviewed by me today.     ASSESSMENT/PLAN:  Atrial fibrillation, unspecified type (H)  Type I or II open displaced fracture of left femoral neck (H)  Lymphedema of left leg  -continue diltiazem for afib control  -restarted lovenox 40mg daily x21 days  -PT/OT/ST to eval  -lymphedema for L upper leg  -continue tubigrip and lower leg velcro  -continue ibuprofen PRN, probably would not tolerate opioids  -CBC, BMP, TSH today pending results     ADDEND: BMP WNL, TSH 7.38        Electronically signed by:  OPAL Burton CNP

## 2021-03-31 ENCOUNTER — NURSING HOME VISIT (OUTPATIENT)
Dept: GERIATRICS | Facility: CLINIC | Age: 86
End: 2021-03-31
Payer: COMMERCIAL

## 2021-03-31 ENCOUNTER — TRANSFERRED RECORDS (OUTPATIENT)
Dept: HEALTH INFORMATION MANAGEMENT | Facility: CLINIC | Age: 86
End: 2021-03-31

## 2021-03-31 VITALS
TEMPERATURE: 97.5 F | BODY MASS INDEX: 27.11 KG/M2 | RESPIRATION RATE: 18 BRPM | WEIGHT: 153 LBS | OXYGEN SATURATION: 96 % | HEART RATE: 62 BPM | SYSTOLIC BLOOD PRESSURE: 120 MMHG | DIASTOLIC BLOOD PRESSURE: 76 MMHG

## 2021-03-31 DIAGNOSIS — I89.0 LYMPHEDEMA OF LEFT LEG: ICD-10-CM

## 2021-03-31 DIAGNOSIS — D72.829 LEUKOCYTOSIS, UNSPECIFIED TYPE: ICD-10-CM

## 2021-03-31 DIAGNOSIS — Z87.81 S/P LEFT HIP FRACTURE: Primary | ICD-10-CM

## 2021-03-31 DIAGNOSIS — D62 ANEMIA DUE TO BLOOD LOSS, ACUTE: ICD-10-CM

## 2021-03-31 PROCEDURE — 99309 SBSQ NF CARE MODERATE MDM 30: CPT | Performed by: NURSE PRACTITIONER

## 2021-03-31 NOTE — PROGRESS NOTES
[place] [facility]  [place] Medical Record Number:  [id number]  Place of Service where encounter took place:  [facility] ([facility] [[id number]]  Chief Complaint   Patient presents with     RECHECK       HPI:    Ila Loredo  is a 89 year old (6/4/1930), who is being seen today for an episodic care visit.  HPI information obtained from: facility chart records, facility staff, patient report and [facility] chart review. Today's concern is:     S/p left hip fracture  Lymphedema of left leg  Leukocytosis, unspecified type  Anemia due to blood loss, acute     Patient seen for follow up, pleasant, limited historian, no distress, states feeling fine, denies L hip pain, recently changed APAP to scheduled, afebrile, labs 3/29 WNL except elevated WBC 17.7, Hgb 7.7, does have chronic leukocytosis, did have bleeding in hospital with Hgb 7.0 and was transfused 2units, FOBT/guaiac today negative, incision approximated, L distal thigh firm edema, L lower leg no s/s DVT, of note was discharged from hospital with ortho plan to continue lovenox but was not listed on the discharge medication list, was given in hospital 3/22-24, restarted on 3/26 at TCU.    WBC's 3/18 14.9, 3/19 16.8, 3/22 21.2, 3/23 19.7, 3/29 17.7  Hgb's 3/18 10.5, 3/19 9.6, 3/22 9.3, 3/23 9.2, 3/29 7.7     Past Medical and Surgical History reviewed in Epic today.    MEDICATIONS:    Current Outpatient Medications   Medication Sig Dispense Refill     acetaminophen (TYLENOL) 500 MG tablet Take 2 tablets (1,000 mg) by mouth 3 times daily 90 tablet 0     diltiazem ER (TIAZAC) 360 MG 24 hr ER beaded capsule TAKE 1 CAPSULE BY MOUTH ONCE DAILY 28 capsule PRN     enoxaparin ANTICOAGULANT (LOVENOX) 40 MG/0.4ML syringe Inject 0.4 mLs (40 mg) Subcutaneous daily for 21 days 8.4 mL 0     ibuprofen (ADVIL/MOTRIN) 400 MG tablet Take 1 tablet (400 mg) by mouth every 8 hours as needed for moderate pain 90 tablet 0     levothyroxine (SYNTHROID/LEVOTHROID)  100 MCG tablet TAKE 1 TABLET BY MOUTH ONCE DAILY 31 tablet 97     NYAMYC 882438 UNIT/GM external powder APPLY TOPICALLY TO AFFECTED AREA(S) THREE TIMES DAILY AS NEEDED 60 g 97     polyethylene glycol (MIRALAX) 17 g packet Take 1 packet by mouth as needed for constipation       Wound Dressings (MEDIHONEY WOUND/BURN DRESSING) paste Apply topically daily And PRN 44 mL 97       REVIEW OF SYSTEMS:  4 point ROS including Respiratory, CV, GI and , other than that noted in the HPI,  is negative    Objective:  /76   Pulse 62   Temp 97.5  F (36.4  C)   Resp 18   Wt 69.4 kg (153 lb)   SpO2 96%   BMI 27.11 kg/m    Exam:  GENERAL APPEARANCE:  in no distress, appears healthy  ENT:  Mouth and posterior oropharynx normal, moist mucous membranes  RESP:  lungs clear to auscultation   CV:  peripheral edema moderate/firm+ in L distal thigh, irregular rhythm rate  ABDOMEN:  bowel sounds normal  M/S:   Gait and station abnormal transfer assist  SKIN:  Inspection of skin and subcutaneous tissue baseline  NEURO:   Examination of sensation by touch normal  PSYCH:  oriented X 3, memory impaired     Labs:   Labs done in SNF are in [place] EPIC. Please refer to them using EPIC/Care Everywhere.    ASSESSMENT/PLAN:  S/p left hip fracture  Lymphedema of left leg  Leukocytosis, unspecified type  Anemia due to blood loss, acute  -seen labs listed above  -leukocytosis chronic, is afebrile, no s/s infection  -Hgb continues to decline, 3 points in 10 days, now 7.7  -Guaiac negative  -discontinue lovenox  -CBC on 4/1  -US/doppler L leg pending  -lymphedema to continue working with          Electronically signed by:  OPAL Burton CNP

## 2021-03-31 NOTE — LETTER
3/31/2021        RE: Ila Loredo  [facility]  [address]  [address] [place] [address]        [place] [facility]  [place] Medical Record Number:  [id number]  [facility][id number]  Chief Complaint   Patient presents with     RECHECK       HPI:    Ila Loredo  is a 89 year old (6/4/1930), who is being seen today for an episodic care visit.  HPI information obtained from: facility chart records, facility staff, patient report and [facility] chart review. Today's concern is:     S/p left hip fracture  Lymphedema of left leg  Leukocytosis, unspecified type  Anemia due to blood loss, acute     Patient seen for follow up, pleasant, limited historian, no distress, states feeling fine, denies L hip pain, recently changed APAP to scheduled, afebrile, labs 3/29 WNL except elevated WBC 17.7, Hgb 7.7, does have chronic leukocytosis, did have bleeding in hospital with Hgb 7.0 and was transfused 2units, FOBT/guaiac today negative, incision approximated, L distal thigh firm edema, L lower leg no s/s DVT, of note was discharged from hospital with ortho plan to continue lovenox but was not listed on the discharge medication list, was given in hospital 3/22-24, restarted on 3/26 at TCU.    WBC's 3/18 14.9, 3/19 16.8, 3/22 21.2, 3/23 19.7, 3/29 17.7  Hgb's 3/18 10.5, 3/19 9.6, 3/22 9.3, 3/23 9.2, 3/29 7.7     Past Medical and Surgical History reviewed in Epic today.    MEDICATIONS:    Current Outpatient Medications   Medication Sig Dispense Refill     acetaminophen (TYLENOL) 500 MG tablet Take 2 tablets (1,000 mg) by mouth 3 times daily 90 tablet 0     diltiazem ER (TIAZAC) 360 MG 24 hr ER beaded capsule TAKE 1 CAPSULE BY MOUTH ONCE DAILY 28 capsule PRN     enoxaparin ANTICOAGULANT (LOVENOX) 40 MG/0.4ML syringe Inject 0.4 mLs (40 mg) Subcutaneous daily for 21 days 8.4 mL 0     ibuprofen (ADVIL/MOTRIN) 400 MG tablet  Take 1 tablet (400 mg) by mouth every 8 hours as needed for moderate pain 90 tablet 0     levothyroxine (SYNTHROID/LEVOTHROID) 100 MCG tablet TAKE 1 TABLET BY MOUTH ONCE DAILY 31 tablet 97     NYAMYC 158574 UNIT/GM external powder APPLY TOPICALLY TO AFFECTED AREA(S) THREE TIMES DAILY AS NEEDED 60 g 97     polyethylene glycol (MIRALAX) 17 g packet Take 1 packet by mouth as needed for constipation       Wound Dressings (MEDIHONEY WOUND/BURN DRESSING) paste Apply topically daily And PRN 44 mL 97       REVIEW OF SYSTEMS:  4 point ROS including Respiratory, CV, GI and , other than that noted in the HPI,  is negative    Objective:  /76   Pulse 62   Temp 97.5  F (36.4  C)   Resp 18   Wt 69.4 kg (153 lb)   SpO2 96%   BMI 27.11 kg/m    Exam:  GENERAL APPEARANCE:  in no distress, appears healthy  ENT:  Mouth and posterior oropharynx normal, moist mucous membranes  RESP:  lungs clear to auscultation   CV:  peripheral edema moderate/firm+ in L distal thigh, irregular rhythm rate  ABDOMEN:  bowel sounds normal  M/S:   Gait and station abnormal transfer assist  SKIN:  Inspection of skin and subcutaneous tissue baseline  NEURO:   Examination of sensation by touch normal  PSYCH:  oriented X 3, memory impaired     Labs:   Labs done in SNF are in [place] EPIC. Please refer to them using EPIC/Care Everywhere.    ASSESSMENT/PLAN:  S/p left hip fracture  Lymphedema of left leg  Leukocytosis, unspecified type  Anemia due to blood loss, acute  -seen labs listed above  -leukocytosis chronic, is afebrile, no s/s infection  -Hgb continues to decline, 3 points in 10 days, now 7.7  -Guaiac negative  -discontinue lovenox  -CBC on 4/1  -US/doppler L leg pending  -lymphedema to continue working with          Electronically signed by:  OPAL Burton CNP                 Sincerely,        OPAL Burton CNP

## 2021-04-01 ENCOUNTER — TRANSFERRED RECORDS (OUTPATIENT)
Dept: HEALTH INFORMATION MANAGEMENT | Facility: CLINIC | Age: 86
End: 2021-04-01

## 2021-04-01 LAB
DIFFERENTIAL: ABNORMAL
ERYTHROCYTE [DISTWIDTH] IN BLOOD BY AUTOMATED COUNT: 18.6 % (ref 11.9–15.5)
HCT VFR BLD AUTO: 25.1 % (ref 34.9–44.5)
HEMOGLOBIN: 7.8 G/DL (ref 12–15.5)
MCH RBC QN AUTO: 28.8 PG (ref 27.6–33.3)
MCHC RBC AUTO-ENTMCNC: 31.1 G/DL (ref 31.5–35.2)
MCV RBC AUTO: 92.6 FL (ref 80–100)
PLATELET # BLD AUTO: 198 10^9/L (ref 150–450)
RBC # BLD AUTO: 2.71 10^12/L (ref 3.9–5.03)
WBC # BLD AUTO: 17.2 10^9/L (ref 3.5–10.5)

## 2021-04-02 ENCOUNTER — NURSING HOME VISIT (OUTPATIENT)
Dept: GERIATRICS | Facility: CLINIC | Age: 86
End: 2021-04-02
Payer: COMMERCIAL

## 2021-04-02 VITALS
SYSTOLIC BLOOD PRESSURE: 131 MMHG | BODY MASS INDEX: 27.11 KG/M2 | OXYGEN SATURATION: 99 % | RESPIRATION RATE: 18 BRPM | HEART RATE: 104 BPM | TEMPERATURE: 97.3 F | DIASTOLIC BLOOD PRESSURE: 75 MMHG | WEIGHT: 153 LBS

## 2021-04-02 DIAGNOSIS — S70.12XD HEMATOMA OF LEFT THIGH, SUBSEQUENT ENCOUNTER: ICD-10-CM

## 2021-04-02 DIAGNOSIS — Z87.81 S/P LEFT HIP FRACTURE: Primary | ICD-10-CM

## 2021-04-02 DIAGNOSIS — D62 ANEMIA DUE TO BLOOD LOSS, ACUTE: ICD-10-CM

## 2021-04-02 DIAGNOSIS — I89.0 LYMPHEDEMA OF LEFT LEG: ICD-10-CM

## 2021-04-02 PROCEDURE — 99309 SBSQ NF CARE MODERATE MDM 30: CPT | Performed by: NURSE PRACTITIONER

## 2021-04-02 NOTE — PROGRESS NOTES
[place] [facility]  [facility] Medical Record Number:  [id number]  Place of Service where encounter took place:  [facility] ([facility] [[id number]]  Chief Complaint   Patient presents with     RECHECK       HPI:    Ila Loredo  is a 89 year old (6/4/1930), who is being seen today for an episodic care visit.  HPI information obtained from: facility chart records, facility staff, patient report and [facility] chart review. Today's concern is:     S/p left hip fracture  Hematoma of left thigh, subsequent encounter  Lymphedema of left leg  Anemia due to blood loss, acute     L hip arthoplasty on 3/19, post-op Hgb dropped to 7 and was transfused, TCU arrival Hgb was 9.2 but dropped to 7.7 on 3/29, was started on lovenox, no overt s/s anemia although L hip surrounding and distal to incision site very firm, lymphedema working with, no distress.    Past Medical and Surgical History reviewed in Epic today.    MEDICATIONS:    Current Outpatient Medications   Medication Sig Dispense Refill     acetaminophen (TYLENOL) 500 MG tablet Take 2 tablets (1,000 mg) by mouth 3 times daily 90 tablet 0     diltiazem ER (TIAZAC) 360 MG 24 hr ER beaded capsule TAKE 1 CAPSULE BY MOUTH ONCE DAILY 28 capsule PRN     ibuprofen (ADVIL/MOTRIN) 400 MG tablet Take 1 tablet (400 mg) by mouth every 8 hours as needed for moderate pain 90 tablet 0     levothyroxine (SYNTHROID/LEVOTHROID) 100 MCG tablet TAKE 1 TABLET BY MOUTH ONCE DAILY 31 tablet 97     NYAMYC 323806 UNIT/GM external powder APPLY TOPICALLY TO AFFECTED AREA(S) THREE TIMES DAILY AS NEEDED 60 g 97     polyethylene glycol (MIRALAX) 17 g packet Take 1 packet by mouth as needed for constipation       Wound Dressings (MEDIHONEY WOUND/BURN DRESSING) paste Apply topically daily And PRN 44 mL 97       REVIEW OF SYSTEMS:  4 point ROS including Respiratory, CV, GI and , other than that noted in the HPI,  is negative    Objective:  /75   Pulse 104   Temp 97.3  F  (36.3  C)   Resp 18   Wt 69.4 kg (153 lb)   SpO2 99%   BMI 27.11 kg/m    Exam:  GENERAL APPEARANCE:  in no distress, appears healthy  ENT:  Mouth and posterior oropharynx normal, moist mucous membranes  RESP:  lungs clear to auscultation   CV:  peripheral edema moderate+ in L thigh, irregular rhythm rate  ABDOMEN:  bowel sounds normal  M/S:   Gait and station abnormal transfer assist  SKIN:  Inspection of skin and subcutaneous tissue baseline  NEURO:   Examination of sensation by touch normal  PSYCH:  oriented X 3, memory impaired     Labs:   Labs done in SNF are in [place] EPIC. Please refer to them using [email or web address] Everywhere.    ASSESSMENT/PLAN:  S/p left hip fracture  Hematoma of left thigh, subsequent encounter  Lymphedema of left leg  Anemia due to blood loss, acute  -continue APAP for pain  -PT/OT working with  -DC'd lovenox on 3/31   -US L leg negative DVT  -Guiac 3/31 negative   -Hgb on 4/1 up to 7.8, asymptomatic  -recheck CBC/BMP on 4/5  -follow up orthopedics on 4/9        Electronically signed by:  OPAL Burton CNP       '

## 2021-04-02 NOTE — LETTER
4/2/2021        RE: Ila Lroedo  [facility]  [address] [facility]  [address] [place] [id number]        [facility]  [place] Medical Record Number:  [id number]  Place of Service where encounter took place:  [facility] (S) [[id number]]  Chief Complaint   Patient presents with     RECHECK       HPI:    Ila Loredo  is a 89 year old (6/4/1930), who is being seen today for an episodic care visit.  HPI information obtained from: facility chart records, facility staff, patient report and [facility] chart review. Today's concern is:     S/p left hip fracture  Hematoma of left thigh, subsequent encounter  Lymphedema of left leg  Anemia due to blood loss, acute     L hip arthoplasty on 3/19, post-op Hgb dropped to 7 and was transfused, TCU arrival Hgb was 9.2 but dropped to 7.7 on 3/29, was started on lovenox, no overt s/s anemia although L hip surrounding and distal to incision site very firm, lymphedema working with, no distress.    Past Medical and Surgical History reviewed in Epic today.    MEDICATIONS:    Current Outpatient Medications   Medication Sig Dispense Refill     acetaminophen (TYLENOL) 500 MG tablet Take 2 tablets (1,000 mg) by mouth 3 times daily 90 tablet 0     diltiazem ER (TIAZAC) 360 MG 24 hr ER beaded capsule TAKE 1 CAPSULE BY MOUTH ONCE DAILY 28 capsule PRN     ibuprofen (ADVIL/MOTRIN) 400 MG tablet Take 1 tablet (400 mg) by mouth every 8 hours as needed for moderate pain 90 tablet 0     levothyroxine (SYNTHROID/LEVOTHROID) 100 MCG tablet TAKE 1 TABLET BY MOUTH ONCE DAILY 31 tablet 97     NYAMYC 327706 UNIT/GM external powder APPLY TOPICALLY TO AFFECTED AREA(S) THREE TIMES DAILY AS NEEDED 60 g 97     polyethylene glycol (MIRALAX) 17 g packet Take 1 packet by mouth as needed for constipation       Wound Dressings (MEDIHONEY WOUND/BURN DRESSING) paste Apply topically daily And PRN 44 mL 97       REVIEW OF SYSTEMS:  4 point ROS including  Respiratory, CV, GI and , other than that noted in the HPI,  is negative    Objective:  /75   Pulse 104   Temp 97.3  F (36.3  C)   Resp 18   Wt 69.4 kg (153 lb)   SpO2 99%   BMI 27.11 kg/m    Exam:  GENERAL APPEARANCE:  in no distress, appears healthy  ENT:  Mouth and posterior oropharynx normal, moist mucous membranes  RESP:  lungs clear to auscultation   CV:  peripheral edema moderate+ in L thigh, irregular rhythm rate  ABDOMEN:  bowel sounds normal  M/S:   Gait and station abnormal transfer assist  SKIN:  Inspection of skin and subcutaneous tissue baseline  NEURO:   Examination of sensation by touch normal  PSYCH:  oriented X 3, memory impaired     Labs:   Labs done in SNF are in [place] EPIC. Please refer to them using [employer]/Care Everywhere.    ASSESSMENT/PLAN:  S/p left hip fracture  Hematoma of left thigh, subsequent encounter  Lymphedema of left leg  Anemia due to blood loss, acute  -continue APAP for pain  -PT/OT working with  -DC'd lovenox on 3/31   -US L leg negative DVT  -Guiac 3/31 negative   -Hgb on 4/1 up to 7.8, asymptomatic  -recheck CBC/BMP on 4/5  -follow up orthopedics on 4/9        Electronically signed by:  OPAL Burton CNP       '        Sincerely,        OPAL Burton CNP

## 2021-04-04 VITALS
BODY MASS INDEX: 26.58 KG/M2 | SYSTOLIC BLOOD PRESSURE: 120 MMHG | HEART RATE: 75 BPM | RESPIRATION RATE: 18 BRPM | TEMPERATURE: 97 F | WEIGHT: 150 LBS | DIASTOLIC BLOOD PRESSURE: 82 MMHG | OXYGEN SATURATION: 96 %

## 2021-04-05 ENCOUNTER — TRANSFERRED RECORDS (OUTPATIENT)
Dept: HEALTH INFORMATION MANAGEMENT | Facility: CLINIC | Age: 86
End: 2021-04-05

## 2021-04-05 ENCOUNTER — NURSING HOME VISIT (OUTPATIENT)
Dept: GERIATRICS | Facility: CLINIC | Age: 86
End: 2021-04-05
Payer: COMMERCIAL

## 2021-04-05 DIAGNOSIS — I89.0 LYMPHEDEMA OF LEFT LEG: ICD-10-CM

## 2021-04-05 DIAGNOSIS — S72.002B: Primary | ICD-10-CM

## 2021-04-05 DIAGNOSIS — D62 ANEMIA DUE TO BLOOD LOSS, ACUTE: ICD-10-CM

## 2021-04-05 LAB
ANION GAP SERPL CALCULATED.3IONS-SCNC: 9 MMOL/L (ref 7–16)
BUN SERPL-MCNC: 11 MG/DL (ref 7–26)
CALCIUM SERPL-MCNC: 8.3 MG/DL (ref 8.4–10.4)
CHLORIDE SERPLBLD-SCNC: 109 MMOL/L (ref 98–109)
CO2 SERPL-SCNC: 21 MMOL/L (ref 20–29)
CREAT SERPL-MCNC: 0.6 MG/DL (ref 0.55–1.02)
DIFFERENTIAL: ABNORMAL
ERYTHROCYTE [DISTWIDTH] IN BLOOD BY AUTOMATED COUNT: 18.7 % (ref 11.9–15.5)
GFR SERPL CREATININE-BSD FRML MDRD: >60 ML/MIN/1.73M2
GLUCOSE SERPL-MCNC: 76 MG/DL (ref 70–100)
HCT VFR BLD AUTO: 27.7 % (ref 34.9–44.5)
HEMOGLOBIN: 8.1 G/DL (ref 12–15.5)
MCH RBC QN AUTO: 28 PG (ref 27.6–33.3)
MCHC RBC AUTO-ENTMCNC: 29.2 G/DL (ref 31.5–35.2)
MCV RBC AUTO: 95.8 FL (ref 80–100)
PLATELET # BLD AUTO: 233 10^9/L (ref 150–450)
POTASSIUM SERPL-SCNC: 3.8 MMOL/L (ref 3.5–5.1)
RBC # BLD AUTO: 2.89 10^12/L (ref 3.9–5.03)
SODIUM SERPL-SCNC: 139 MMOL/L (ref 136–145)
WBC # BLD AUTO: 12.4 10^9/L (ref 3.5–10.5)

## 2021-04-05 PROCEDURE — 99309 SBSQ NF CARE MODERATE MDM 30: CPT | Performed by: NURSE PRACTITIONER

## 2021-04-05 NOTE — PROGRESS NOTES
[facility]  [place] Medical Record Number:  [id number]  Place of Service where encounter took place:  [facility] ([facility] [[id number]]  Chief Complaint   Patient presents with     RECHECK       HPI:    Ila Loredo  is a 89 year old (6/4/1930), who is being seen today for an episodic care visit.  HPI information obtained from: facility chart records, facility staff, patient report and [facility] chart review. Today's concern is:     Type I or II open displaced fracture of left femoral neck (H)  Lymphedema of left leg  Anemia due to blood loss, acute     Patient seen for follow up, non-ambulatory, L hip pain controlled with APAP and ice, has not used ibuprofen PRN, edema in L thigh with slight improvement, bilateral lower legs scant edema, is not on DVT prophylaxis due to bleeding risk and low Hgb, on 4/1 Hgb up to 7.8, no distress.    Past Medical and Surgical History reviewed in Epic today.    MEDICATIONS:    Current Outpatient Medications   Medication Sig Dispense Refill     acetaminophen (TYLENOL) 500 MG tablet Take 2 tablets (1,000 mg) by mouth 3 times daily 90 tablet 0     diltiazem ER (TIAZAC) 360 MG 24 hr ER beaded capsule TAKE 1 CAPSULE BY MOUTH ONCE DAILY 28 capsule PRN     ibuprofen (ADVIL/MOTRIN) 400 MG tablet Take 1 tablet (400 mg) by mouth every 8 hours as needed for moderate pain 90 tablet 0     levothyroxine (SYNTHROID/LEVOTHROID) 100 MCG tablet TAKE 1 TABLET BY MOUTH ONCE DAILY 31 tablet 97     NYAMYC 281617 UNIT/GM external powder APPLY TOPICALLY TO AFFECTED AREA(S) THREE TIMES DAILY AS NEEDED 60 g 97     polyethylene glycol (MIRALAX) 17 g packet Take 1 packet by mouth as needed for constipation       Wound Dressings (MEDIHONEY WOUND/BURN DRESSING) paste Apply topically daily And PRN 44 mL 97       REVIEW OF SYSTEMS:  4 point ROS including Respiratory, CV, GI and , other than that noted in the HPI,  is negative    Objective:  /82   Pulse 75   Temp 97  F  (36.1  C)   Resp 18   Wt 68 kg (150 lb)   SpO2 96%   BMI 26.58 kg/m    Exam:  GENERAL APPEARANCE:  in no distress, appears healthy  ENT:  Mouth and posterior oropharynx normal, moist mucous membranes  RESP:  lungs clear to auscultation   CV:  peripheral edema moderate L thigh, mild L lower leg+ in L leg  ABDOMEN:  bowel sounds normal  M/S:   Gait and station abnormal transfer assist  SKIN:  Inspection of skin and subcutaneous tissue baseline  NEURO:   Examination of sensation by touch normal  PSYCH:  oriented X 3, memory impaired     Labs:   Labs done in SNF are in [place] EPIC. Please refer to them using [employer]/Care Everywhere.    ASSESSMENT/PLAN:  Type I or II open displaced fracture of left femoral neck (H)  Lymphedema of left leg  -PT/OT to continue therapies  -APAP for pain  -consider discontinue of ibuprofen in not using  -ortho follow up on 4/9  -no discharge planning yet  -pending BMP    Anemia due to blood loss, acute  -Hgb in hospital 7-10.5  -Hgb in TCU 3/29 7.7, 4/1 7.8  -Hgb today 8.1  -recheck Hgb on 4/12         Electronically signed by:  OPAL Burton CNP

## 2021-04-05 NOTE — LETTER
4/5/2021        RE: Ila Loredo  [facility]  [address] [facility]  [address] [place] [id number]        [facility]  [place] Medical Record Number:  [id number]  Place of Service where encounter took place:  [facility] (S) [[id number]]  Chief Complaint   Patient presents with     RECHECK       HPI:    Ila Loredo  is a 89 year old (6/4/1930), who is being seen today for an episodic care visit.  HPI information obtained from: facility chart records, facility staff, patient report and [facility] chart review. Today's concern is:     Type I or II open displaced fracture of left femoral neck (H)  Lymphedema of left leg  Anemia due to blood loss, acute     Patient seen for follow up, non-ambulatory, L hip pain controlled with APAP and ice, has not used ibuprofen PRN, edema in L thigh with slight improvement, bilateral lower legs scant edema, is not on DVT prophylaxis due to bleeding risk and low Hgb, on 4/1 Hgb up to 7.8, no distress.    Past Medical and Surgical History reviewed in Epic today.    MEDICATIONS:    Current Outpatient Medications   Medication Sig Dispense Refill     acetaminophen (TYLENOL) 500 MG tablet Take 2 tablets (1,000 mg) by mouth 3 times daily 90 tablet 0     diltiazem ER (TIAZAC) 360 MG 24 hr ER beaded capsule TAKE 1 CAPSULE BY MOUTH ONCE DAILY 28 capsule PRN     ibuprofen (ADVIL/MOTRIN) 400 MG tablet Take 1 tablet (400 mg) by mouth every 8 hours as needed for moderate pain 90 tablet 0     levothyroxine (SYNTHROID/LEVOTHROID) 100 MCG tablet TAKE 1 TABLET BY MOUTH ONCE DAILY 31 tablet 97     NYAMYC 776358 UNIT/GM external powder APPLY TOPICALLY TO AFFECTED AREA(S) THREE TIMES DAILY AS NEEDED 60 g 97     polyethylene glycol (MIRALAX) 17 g packet Take 1 packet by mouth as needed for constipation       Wound Dressings (MEDIHONEY WOUND/BURN DRESSING) paste Apply topically daily And PRN 44 mL 97       REVIEW OF SYSTEMS:  4 point ROS including  Respiratory, CV, GI and , other than that noted in the HPI,  is negative    Objective:  /82   Pulse 75   Temp 97  F (36.1  C)   Resp 18   Wt 68 kg (150 lb)   SpO2 96%   BMI 26.58 kg/m    Exam:  GENERAL APPEARANCE:  in no distress, appears healthy  ENT:  Mouth and posterior oropharynx normal, moist mucous membranes  RESP:  lungs clear to auscultation   CV:  peripheral edema moderate L thigh, mild L lower leg+ in L leg  ABDOMEN:  bowel sounds normal  M/S:   Gait and station abnormal transfer assist  SKIN:  Inspection of skin and subcutaneous tissue baseline  NEURO:   Examination of sensation by touch normal  PSYCH:  oriented X 3, memory impaired     Labs:   Labs done in SNF are in [place] EPIC. Please refer to them using [email or web address] Everywhere.    ASSESSMENT/PLAN:  Type I or II open displaced fracture of left femoral neck (H)  Lymphedema of left leg  -PT/OT to continue therapies  -APAP for pain  -consider discontinue of ibuprofen in not using  -ortho follow up on 4/9  -no discharge planning yet  -pending BMP    Anemia due to blood loss, acute  -Hgb in hospital 7-10.5  -Hgb in TCU 3/29 7.7, 4/1 7.8  -Hgb today 8.1  -recheck Hgb on 4/12         Electronically signed by:  OPAL Burton CNP                 Sincerely,        OPAL Burton CNP

## 2021-04-07 ENCOUNTER — NURSING HOME VISIT (OUTPATIENT)
Dept: GERIATRICS | Facility: CLINIC | Age: 86
End: 2021-04-07
Payer: COMMERCIAL

## 2021-04-07 VITALS
DIASTOLIC BLOOD PRESSURE: 88 MMHG | RESPIRATION RATE: 16 BRPM | SYSTOLIC BLOOD PRESSURE: 136 MMHG | TEMPERATURE: 97.7 F | BODY MASS INDEX: 26.58 KG/M2 | HEART RATE: 120 BPM | OXYGEN SATURATION: 97 % | WEIGHT: 150 LBS

## 2021-04-07 DIAGNOSIS — F03.90 DEMENTIA WITHOUT BEHAVIORAL DISTURBANCE, UNSPECIFIED DEMENTIA TYPE: ICD-10-CM

## 2021-04-07 DIAGNOSIS — S72.012B: Primary | ICD-10-CM

## 2021-04-07 DIAGNOSIS — D62 ANEMIA DUE TO BLOOD LOSS, ACUTE: ICD-10-CM

## 2021-04-07 DIAGNOSIS — M62.81 GENERALIZED MUSCLE WEAKNESS: ICD-10-CM

## 2021-04-07 DIAGNOSIS — D69.6 THROMBOCYTOPENIA (H): ICD-10-CM

## 2021-04-07 PROCEDURE — 99309 SBSQ NF CARE MODERATE MDM 30: CPT | Performed by: NURSE PRACTITIONER

## 2021-04-07 NOTE — LETTER
4/7/2021        RE: Ila Loredo  [facility]  [address] [facility]  [address] [place] [id number]        [facility]  [place] Medical Record Number:  [id number]  Place of Service where encounter took place:  [facility] ([facility]) [[id number]]  Chief Complaint   Patient presents with     RECHECK       HPI:    Ila Loredo  is a 89 year old (6/4/1930), who is being seen today for an episodic care visit.  HPI information obtained from: facility chart records, facility staff, patient report and [facility] chart review. Today's concern is:     Open subcapital fracture of left femur (H)  Generalized muscle weakness  Thrombocytopenia (H)  Anemia due to blood loss, acute  Dementia without behavioral disturbance, unspecified dementia type (H)     Patient seen for follow up, weak, unable to transfer, recent platelet count 133, Hgb on 4/5 was up to 8.1, was transfused in hospital, no overt s/s anemia, moderate cognitive limitations with memory loss.    Past Medical and Surgical History reviewed in Epic today.    MEDICATIONS:    Current Outpatient Medications   Medication Sig Dispense Refill     acetaminophen (TYLENOL) 500 MG tablet Take 2 tablets (1,000 mg) by mouth 3 times daily 90 tablet 0     diltiazem ER (TIAZAC) 360 MG 24 hr ER beaded capsule TAKE 1 CAPSULE BY MOUTH ONCE DAILY 28 capsule PRN     ibuprofen (ADVIL/MOTRIN) 400 MG tablet Take 1 tablet (400 mg) by mouth every 8 hours as needed for moderate pain 90 tablet 0     levothyroxine (SYNTHROID/LEVOTHROID) 100 MCG tablet TAKE 1 TABLET BY MOUTH ONCE DAILY 31 tablet 97     NYAMYC 207137 UNIT/GM external powder APPLY TOPICALLY TO AFFECTED AREA(S) THREE TIMES DAILY AS NEEDED 60 g 97     polyethylene glycol (MIRALAX) 17 g packet Take 1 packet by mouth as needed for constipation       Wound Dressings (MEDIHONEY WOUND/BURN DRESSING) paste Apply topically daily And PRN 44 mL 97       REVIEW OF SYSTEMS:  4 point ROS  "including Respiratory, CV, GI and , other than that noted in the HPI,  is negative    Objective:  /88   Pulse 120   Temp 97.7  F (36.5  C)   Resp 16   Wt 68 kg (150 lb)   SpO2 97%   BMI 26.58 kg/m    Exam:  GENERAL APPEARANCE:  in no distress, appears healthy  ENT:  Mouth and posterior oropharynx normal, moist mucous membranes  RESP:  lungs clear to auscultation   CV:  peripheral edema mild/moderate+ in L thigh/lower leg  ABDOMEN:  bowel sounds normal  M/S:   Gait and station abnormal transfer assist  SKIN:  Inspection of skin and subcutaneous tissue baseline  NEURO:   Examination of sensation by touch normal  PSYCH:  oriented X 3, memory impaired     Labs:   Labs done in SNF are in [place] EPIC. Please refer to them using [employer]/Care Everywhere.    ASSESSMENT/PLAN:  Open subcapital fracture of left femur (H)  Generalized muscle weakness  -continue PT/OT  -APAP scheduled TID, ibuprofen PRN only  -lymph working with on thigh edema  -ordering WC also    Thrombocytopenia (H)  Anemia due to blood loss, acute  -blood loss+ during surgery  -Plat 133 in hospital, 233 on 4/5  -Hgb 4/5 was 8.1, slowly trending up  -repeat Hgb on 4/12    Dementia without behavioral disturbance, unspecified dementia type (H)  -moderate cognitive limitation, potential post anesthesia confusion  -consider f/u with neurology  -staff to support as indicated        Electronically signed by:  OPAL Burton CNP         F2F for Wheelchair    Wheelchair Documentation  Size: 20 x 16  Corresponding cushion: Yes: regular  Standard foot rests: Yes  Elevating leg rests: Yes  Arm rests: Yes: full  Lap tray: No  Dose the patient use oxygen? No   Is the patient able to propel wheelchair? Yes If no why not?    And is there someone who can? Yes  5'5\", 150lbs  1. The patient has mobility limitations that impairs their ability to participate in one or more mobility related activities: Toileting, Feeding and Grooming.  The wheelchair " is suitable and necessary for use in the patient's home.  2. The patient's mobility limitations cannot be safely resolved by using a cane/walker:Yes    Reason why a cane or walker will not meet the patient's needs. (ie: balance, tolerance, level of assistance) transfer assist, pain  3. The patients home has adequate access to use a manual wheelchair:Yes  4. The use of a manual wheelchair on a regular basis will improve the patients ability to participate in mobility related ADL's at home:Yes  5. The patient is willing to use a manual wheelchair at home:Yes  6. The patient has adequate upper body strength and the mental capability to safely use a manual wheelchair and/or has a caregiver that is able to assist: Yes  7. Does the patient have a lower extremity injury or edema?Yes  Reason for Type of Wheelchair  Patient weight: 0 lbs 0 oz  Light Weight Wheelchair: Patient is unable to self-propel a standard wheelchair in the home but can self propel a light weight wheelchair.     Paul Cuenca NP  NPI [id number]  [phone]              Sincerely,        OPAL Burton CNP

## 2021-04-07 NOTE — PROGRESS NOTES
[facility]  [place] Medical Record Number:  [id number]  Place of Service where encounter took place:  [facility] ([facility] [[id number]]  Chief Complaint   Patient presents with     RECHECK       HPI:    Ila Loredo  is a 89 year old (6/4/1930), who is being seen today for an episodic care visit.  HPI information obtained from: facility chart records, facility staff, patient report and [facility] chart review. Today's concern is:     Open subcapital fracture of left femur (H)  Generalized muscle weakness  Thrombocytopenia (H)  Anemia due to blood loss, acute  Dementia without behavioral disturbance, unspecified dementia type (H)     Patient seen for follow up, weak, unable to transfer, recent platelet count 133, Hgb on 4/5 was up to 8.1, was transfused in hospital, no overt s/s anemia, moderate cognitive limitations with memory loss.    Past Medical and Surgical History reviewed in Epic today.    MEDICATIONS:    Current Outpatient Medications   Medication Sig Dispense Refill     acetaminophen (TYLENOL) 500 MG tablet Take 2 tablets (1,000 mg) by mouth 3 times daily 90 tablet 0     diltiazem ER (TIAZAC) 360 MG 24 hr ER beaded capsule TAKE 1 CAPSULE BY MOUTH ONCE DAILY 28 capsule PRN     ibuprofen (ADVIL/MOTRIN) 400 MG tablet Take 1 tablet (400 mg) by mouth every 8 hours as needed for moderate pain 90 tablet 0     levothyroxine (SYNTHROID/LEVOTHROID) 100 MCG tablet TAKE 1 TABLET BY MOUTH ONCE DAILY 31 tablet 97     NYAMYC 725966 UNIT/GM external powder APPLY TOPICALLY TO AFFECTED AREA(S) THREE TIMES DAILY AS NEEDED 60 g 97     polyethylene glycol (MIRALAX) 17 g packet Take 1 packet by mouth as needed for constipation       Wound Dressings (MEDIHONEY WOUND/BURN DRESSING) paste Apply topically daily And PRN 44 mL 97       REVIEW OF SYSTEMS:  4 point ROS including Respiratory, CV, GI and , other than that noted in the HPI,  is negative    Objective:  /88   Pulse 120   Temp  "97.7  F (36.5  C)   Resp 16   Wt 68 kg (150 lb)   SpO2 97%   BMI 26.58 kg/m    Exam:  GENERAL APPEARANCE:  in no distress, appears healthy  ENT:  Mouth and posterior oropharynx normal, moist mucous membranes  RESP:  lungs clear to auscultation   CV:  peripheral edema mild/moderate+ in L thigh/lower leg  ABDOMEN:  bowel sounds normal  M/S:   Gait and station abnormal transfer assist  SKIN:  Inspection of skin and subcutaneous tissue baseline  NEURO:   Examination of sensation by touch normal  PSYCH:  oriented X 3, memory impaired     Labs:   Labs done in SNF are in [place] EPIC. Please refer to them using EPIC/Care Everywhere.    ASSESSMENT/PLAN:  Open subcapital fracture of left femur (H)  Generalized muscle weakness  -continue PT/OT  -APAP scheduled TID, ibuprofen PRN only  -lymph working with on thigh edema  -ordering WC also    Thrombocytopenia (H)  Anemia due to blood loss, acute  -blood loss+ during surgery  -Plat 133 in hospital, 233 on 4/5  -Hgb 4/5 was 8.1, slowly trending up  -repeat Hgb on 4/12    Dementia without behavioral disturbance, unspecified dementia type (H)  -moderate cognitive limitation, potential post anesthesia confusion  -consider f/u with neurology  -staff to support as indicated        Electronically signed by:  Paul Cuenca, OPAL CNP         F2F for Wheelchair    Wheelchair Documentation  Size: 20 x 16  Corresponding cushion: Yes: regular  Standard foot rests: Yes  Elevating leg rests: Yes  Arm rests: Yes: full  Lap tray: No  Dose the patient use oxygen? No   Is the patient able to propel wheelchair? Yes If no why not?    And is there someone who can? Yes  5'5\", 150lbs  1. The patient has mobility limitations that impairs their ability to participate in one or more mobility related activities: Toileting, Feeding and Grooming.  The wheelchair is suitable and necessary for use in the patient's home.  2. The patient's mobility limitations cannot be safely resolved by using a " cane/walker:Yes    Reason why a cane or walker will not meet the patient's needs. (ie: balance, tolerance, level of assistance) transfer assist, pain  3. The patients home has adequate access to use a manual wheelchair:Yes  4. The use of a manual wheelchair on a regular basis will improve the patients ability to participate in mobility related ADL's at home:Yes  5. The patient is willing to use a manual wheelchair at home:Yes  6. The patient has adequate upper body strength and the mental capability to safely use a manual wheelchair and/or has a caregiver that is able to assist: Yes  7. Does the patient have a lower extremity injury or edema?Yes  Reason for Type of Wheelchair  Patient weight: 0 lbs 0 oz  Light Weight Wheelchair: Patient is unable to self-propel a standard wheelchair in the home but can self propel a light weight wheelchair.     Paul LERNER [id number]  [phone]

## 2021-04-07 NOTE — LETTER
4/7/2021        RE: Ila Loredo  [facility]  [address] [facility]  [address] [place] [id number]        [facility]  [facility] Record Number:  [id number]  Place of Service where encounter took place:  [facility] [[id number]]  Chief Complaint   Patient presents with     RECHECK       HPI:    Ila Loredo  is a 89 year old (6/4/1930), who is being seen today for an episodic care visit.  HPI information obtained from: facility chart records, facility staff, patient report and [facility] chart review. Today's concern is:     Open subcapital fracture of left femur (H)  Generalized muscle weakness  Thrombocytopenia (H)  Anemia due to blood loss, acute  Dementia without behavioral disturbance, unspecified dementia type (H)     Patient seen for follow up, weak, unable to transfer, recent platelet count 133, Hgb on 4/5 was up to 8.1, was transfused in hospital, no overt s/s anemia, moderate cognitive limitations with memory loss.    Past Medical and Surgical History reviewed in Epic today.    MEDICATIONS:    Current Outpatient Medications   Medication Sig Dispense Refill     acetaminophen (TYLENOL) 500 MG tablet Take 2 tablets (1,000 mg) by mouth 3 times daily 90 tablet 0     diltiazem ER (TIAZAC) 360 MG 24 hr ER beaded capsule TAKE 1 CAPSULE BY MOUTH ONCE DAILY 28 capsule PRN     ibuprofen (ADVIL/MOTRIN) 400 MG tablet Take 1 tablet (400 mg) by mouth every 8 hours as needed for moderate pain 90 tablet 0     levothyroxine (SYNTHROID/LEVOTHROID) 100 MCG tablet TAKE 1 TABLET BY MOUTH ONCE DAILY 31 tablet 97     NYAMYC 844643 UNIT/GM external powder APPLY TOPICALLY TO AFFECTED AREA(S) THREE TIMES DAILY AS NEEDED 60 g 97     polyethylene glycol (MIRALAX) 17 g packet Take 1 packet by mouth as needed for constipation       Wound Dressings (MEDIHONEY WOUND/BURN DRESSING) paste Apply topically daily And PRN 44 mL 97       REVIEW OF SYSTEMS:  4 point ROS  "including Respiratory, CV, GI and , other than that noted in the HPI,  is negative    Objective:  /88   Pulse 120   Temp 97.7  F (36.5  C)   Resp 16   Wt 68 kg (150 lb)   SpO2 97%   BMI 26.58 kg/m    Exam:  GENERAL APPEARANCE:  in no distress, appears healthy  ENT:  Mouth and posterior oropharynx normal, moist mucous membranes  RESP:  lungs clear to auscultation   CV:  peripheral edema mild/moderate+ in L thigh/lower leg  ABDOMEN:  bowel sounds normal  M/S:   Gait and station abnormal transfer assist  SKIN:  Inspection of skin and subcutaneous tissue baseline  NEURO:   Examination of sensation by touch normal  PSYCH:  oriented X 3, memory impaired     Labs:   Labs done in SNF are in [facility] EPIC. Please refer to them using [employer]/Care Everywhere.    ASSESSMENT/PLAN:  Open subcapital fracture of left femur (H)  Generalized muscle weakness  -continue PT/OT  -APAP scheduled TID, ibuprofen PRN only  -lymph working with on thigh edema  -ordering WC also    Thrombocytopenia (H)  Anemia due to blood loss, acute  -blood loss+ during surgery  -Plat 133 in hospital, 233 on 4/5  -Hgb 4/5 was 8.1, slowly trending up  -repeat Hgb on 4/12    Dementia without behavioral disturbance, unspecified dementia type (H)  -moderate cognitive limitation, potential post anesthesia confusion  -consider f/u with neurology  -staff to support as indicated        Electronically signed by:  OPAL Burton CNP         F2F for Wheelchair    Wheelchair Documentation  Size: 20 x 16  Corresponding cushion: Yes: regular  Standard foot rests: Yes  Elevating leg rests: Yes  Arm rests: Yes: full  Lap tray: No  Dose the patient use oxygen? No   Is the patient able to propel wheelchair? Yes If no why not?    And is there someone who can? Yes  5'5\", 150lbs  1. The patient has mobility limitations that impairs their ability to participate in one or more mobility related activities: Toileting, Feeding and Grooming.  The wheelchair " is suitable and necessary for use in the patient's home.  2. The patient's mobility limitations cannot be safely resolved by using a cane/walker:Yes    Reason why a cane or walker will not meet the patient's needs. (ie: balance, tolerance, level of assistance) transfer assist, pain  3. The patients home has adequate access to use a manual wheelchair:Yes  4. The use of a manual wheelchair on a regular basis will improve the patients ability to participate in mobility related ADL's at home:Yes  5. The patient is willing to use a manual wheelchair at home:Yes  6. The patient has adequate upper body strength and the mental capability to safely use a manual wheelchair and/or has a caregiver that is able to assist: Yes  7. Does the patient have a lower extremity injury or edema?Yes  Reason for Type of Wheelchair  Patient weight: 0 lbs 0 oz  Light Weight Wheelchair: Patient is unable to self-propel a standard wheelchair in the home but can self propel a light weight wheelchair.     Paul Cuenca NP  NPI [id number]  [phone]              Sincerely,        OPAL Burton CNP

## 2021-04-09 ENCOUNTER — DISCHARGE SUMMARY NURSING HOME (OUTPATIENT)
Dept: GERIATRICS | Facility: CLINIC | Age: 86
End: 2021-04-09
Payer: COMMERCIAL

## 2021-04-09 VITALS
TEMPERATURE: 97.5 F | HEART RATE: 60 BPM | SYSTOLIC BLOOD PRESSURE: 111 MMHG | WEIGHT: 150 LBS | BODY MASS INDEX: 26.58 KG/M2 | RESPIRATION RATE: 18 BRPM | OXYGEN SATURATION: 98 % | DIASTOLIC BLOOD PRESSURE: 72 MMHG

## 2021-04-09 DIAGNOSIS — E03.9 HYPOTHYROIDISM, UNSPECIFIED TYPE: ICD-10-CM

## 2021-04-09 DIAGNOSIS — Z87.81 S/P LEFT HIP FRACTURE: ICD-10-CM

## 2021-04-09 DIAGNOSIS — I89.0 LYMPHEDEMA OF LEFT LEG: ICD-10-CM

## 2021-04-09 DIAGNOSIS — D62 ANEMIA DUE TO BLOOD LOSS, ACUTE: ICD-10-CM

## 2021-04-09 DIAGNOSIS — S72.002B: Primary | ICD-10-CM

## 2021-04-09 PROCEDURE — 99316 NF DSCHRG MGMT 30 MIN+: CPT | Performed by: NURSE PRACTITIONER

## 2021-04-09 NOTE — PROGRESS NOTES
[facility] DISCHARGE SUMMARY  PATIENT'S NAME: Ila Loredo  YOB: 1930  MEDICAL RECORD NUMBER:  [id number]  Place of Service where encounter took place:  [facility] [[id number]]    PRIMARY CARE PROVIDER AND CLINIC RESPONSIBLE AFTER TRANSFER:   OPAL Patterson CNP, [address] / [place] [address]    G Provider     Transferring providers: OPAL Burton CNP, Dr. Vidhya MD  Recent Hospitalization/ED:  [facility] stay 3/18/21 to 3/24/21.  Date of SNF Admission: March/24/2021  Date of SNF (anticipated) Discharge: April/09/2021  Discharged to: previous assisted living  Cognitive Scores: NA  Physical Function: Wheelchair dependent and WBAT  DME: Wheelchair and Walker    CODE STATUS/ADVANCE DIRECTIVES DISCUSSION:  DNR   ALLERGIES: Codeine    DISCHARGE DIAGNOSIS/NURSING FACILITY COURSE:   (S72.002B) Type I or II open displaced fracture of left femoral neck (H)  (primary encounter diagnosis)  (Z87.81) S/p left hip fracture  (I89.0) Lymphedema of left leg  Comment: hospitalized 3/18-24, WBAT, edema + up thigh, pain controlled with APAP, US in TCU negative DVT, transferring to [place] with spouse  Plan:   -continue APAP TID and ibuprofen PRN  -no DVT prophylaxis, Hgb dropped when lovenox restarted  -RN, HHA, PT, OT to eval and treat  -[facility] Ortho f/u appt on 4/9  -WC F2F completed on 4/7  -consider discontinue of ibuprofen if not taking    (D62) Anemia due to blood loss, acute  Comment: Hgb in hospital dropped to 7, was transfused, Hgb 4/5 slowly trending upwards to 8.1  Plan: follow up Hgb week of 4/12    (E03.9) Hypothyroidism, unspecified type  Comment: TSH on 4/1 was 7.38, elevation could be attributed to trauma, on levo 100mcg   Plan: follow up TSH with PCP    Past Medical History:  has no past medical history on file.    Discharge Medications:    Current Outpatient Medications   Medication Sig Dispense Refill      acetaminophen (TYLENOL) 500 MG tablet Take 2 tablets (1,000 mg) by mouth 3 times daily 90 tablet 0     diltiazem ER (TIAZAC) 360 MG 24 hr ER beaded capsule TAKE 1 CAPSULE BY MOUTH ONCE DAILY 28 capsule PRN     ibuprofen (ADVIL/MOTRIN) 400 MG tablet Take 1 tablet (400 mg) by mouth every 8 hours as needed for moderate pain 90 tablet 0     levothyroxine (SYNTHROID/LEVOTHROID) 100 MCG tablet TAKE 1 TABLET BY MOUTH ONCE DAILY 31 tablet 97     NYAMYC 088763 UNIT/GM external powder APPLY TOPICALLY TO AFFECTED AREA(S) THREE TIMES DAILY AS NEEDED 60 g 97     polyethylene glycol (MIRALAX) 17 g packet Take 1 packet by mouth as needed for constipation       Wound Dressings (MEDIHONEY WOUND/BURN DRESSING) paste Apply topically daily And PRN 44 mL 97       Medication Changes/Rationale:     discontinue lovenox, added ibuprofen PRN    Controlled medications sent with patient:   not applicable/none     ROS:   4 point ROS including Respiratory, CV, GI and , other than that noted in the HPI,  is negative    Physical Exam:   Vitals: /72   Pulse 60   Temp 97.5  F (36.4  C)   Resp 18   Wt 68 kg (150 lb)   SpO2 98%   BMI 26.58 kg/m    BMI= Body mass index is 26.58 kg/m .  GENERAL APPEARANCE:  in no distress, appears healthy  ENT:  Mouth and posterior oropharynx normal, moist mucous membranes  RESP:  lungs clear to auscultation   CV:  peripheral edema 1+ in left thigh and lower leg, rate-normal  ABDOMEN:  bowel sounds normal  M/S:   Gait and station abnormal transfer assist  SKIN:  Inspection of skin and subcutaneous tissue baseline  NEURO:   Examination of sensation by touch normal  PSYCH:  oriented X 3     SNF labs: Labs done in SNF are in [facility] EPIC. Please refer to them using EPIC/Care Everywhere.      DISCHARGE PLAN:    Follow up labs: No labs orders/due    Medical Follow Up:      Follow up with primary care provider in 1 weeks    MTM referral needed and placed by this provider: Yes: WC ordered    Current [facility]  scheduled appointments:   NA    Discharge Services: Home Care:  Occupational Therapy, Physical Therapy, Registered Nurse and Home Health Aide    Discharge Instructions Verbalized to Patient at Discharge:     None      TOTAL DISCHARGE TIME:   Greater than 30 minutes  Electronically signed by:  OPAL Burton CNP     Documentation of Face to Face and Certification for Home Health Services    I certify that patient: Ila Loredo is under my care and that I, or a nurse practitioner or physician's assistant working with me, had a face-to-face encounter that meets the physician face-to-face encounter requirements with this patient on: 4/9/2021.    This encounter with the patient was in whole, or in part, for the following medical condition, which is the primary reason for home health care:   1. Type I or II open displaced fracture of left femoral neck (H)    2. S/p left hip fracture    3. Lymphedema of left leg    4. Anemia due to blood loss, acute    5. Hypothyroidism, unspecified type        I certify that, based on my findings, the following services are medically necessary home health services: Nursing, Occupational Therapy, Physical Therapy and HHA.    My clinical findings support the need for the above services because: Nurse is needed: To provide assessment and oversight required in the home to assure adherence to the medical plan due to: pain present, anemia.., Occupational Therapy Services are needed to assess and treat cognitive ability and address ADL safety due to impairment in DME eval, weakness., Physical Therapy Services are needed to assess and treat the following functional impairments: weakness, gait issues. and HHA for bathing safety    Further, I certify that my clinical findings support that this patient is homebound (i.e. absences from home require considerable and taxing effort and are for medical reasons or [place] services or infrequently or of short duration when for  other reasons) because: Requires assistance of another person or specialized equipment to access medical services because patient: Is unable to operate assistive equipment on their own...    Based on the above findings. I certify that this patient is confined to the home and needs intermittent skilled nursing care, physical therapy and/or speech therapy.  The patient is under my care, and I have initiated the establishment of the plan of care.  This patient will be followed by a physician who will periodically review the plan of care.  Physician/Provider to provide follow up care: Trinity Singh    Attending [facility] physician (the Medicare certified P[facility] provider): OPAL Burton CNP  Physician Signature: See electronic signature associated with these discharge orders.  Date: 4/9/2021

## 2021-04-09 NOTE — LETTER
4/9/2021        RE: Ila Loredo  [facility]  [address] [facility]  [address]  [place] [address]        [facility] DISCHARGE SUMMARY  PATIENT'S NAME: Ila Loredo  YOB: 1930  MEDICAL RECORD NUMBER:  [id number]  Place of Service where encounter took place:  [facility] [[id number]]    PRIMARY CARE PROVIDER AND CLINIC RESPONSIBLE AFTER TRANSFER:   Trinity Singh, OPAL CNP, [address] / [place] [address]    [facility] Provider     Transferring providers: OPAL Burton CNP, Dr. Vidhya MD  Recent Hospitalization/ED:  [facility] stay 3/18/21 to 3/24/21.  Date of SNF Admission: March/24/2021  Date of SNF (anticipated) Discharge: April/09/2021  Discharged to: previous assisted living  Cognitive Scores: NA  Physical Function: Wheelchair dependent and WBAT  DME: Wheelchair and Walker    CODE STATUS/ADVANCE DIRECTIVES DISCUSSION:  DNR   ALLERGIES: Codeine    DISCHARGE DIAGNOSIS/NURSING FACILITY COURSE:   (S72.002B) Type I or II open displaced fracture of left femoral neck (H)  (primary encounter diagnosis)  (Z87.81) S/p left hip fracture  (I89.0) Lymphedema of left leg  Comment: hospitalized 3/18-24, WBAT, edema + up thigh, pain controlled with APAP, US in TCU negative DVT, transferring to AL with spouse  Plan:   -continue APAP TID and ibuprofen PRN  -no DVT prophylaxis, Hgb dropped when lovenox restarted  -RN, HHA, PT, OT to eval and treat  -TC Ortho f/u appt on 4/9  -WC F2F completed on 4/7  -consider discontinue of ibuprofen if not taking    (D62) Anemia due to blood loss, acute  Comment: Hgb in hospital dropped to 7, was transfused, Hgb 4/5 slowly trending upwards to 8.1  Plan: follow up Hgb week of 4/12    (E03.9) Hypothyroidism, unspecified type  Comment: TSH on 4/1 was 7.38, elevation could be attributed to trauma, on levo 100mcg   Plan: follow up TSH with PCP    Past Medical History:  has  no past medical history on file.    Discharge Medications:    Current Outpatient Medications   Medication Sig Dispense Refill     acetaminophen (TYLENOL) 500 MG tablet Take 2 tablets (1,000 mg) by mouth 3 times daily 90 tablet 0     diltiazem ER (TIAZAC) 360 MG 24 hr ER beaded capsule TAKE 1 CAPSULE BY MOUTH ONCE DAILY 28 capsule PRN     ibuprofen (ADVIL/MOTRIN) 400 MG tablet Take 1 tablet (400 mg) by mouth every 8 hours as needed for moderate pain 90 tablet 0     levothyroxine (SYNTHROID/LEVOTHROID) 100 MCG tablet TAKE 1 TABLET BY MOUTH ONCE DAILY 31 tablet 97     NYAMYC 919954 UNIT/GM external powder APPLY TOPICALLY TO AFFECTED AREA(S) THREE TIMES DAILY AS NEEDED 60 g 97     polyethylene glycol (MIRALAX) 17 g packet Take 1 packet by mouth as needed for constipation       Wound Dressings (MEDIHONEY WOUND/BURN DRESSING) paste Apply topically daily And PRN 44 mL 97       Medication Changes/Rationale:     discontinue lovenox, added ibuprofen PRN    Controlled medications sent with patient:   not applicable/none     ROS:   4 point ROS including Respiratory, CV, GI and , other than that noted in the HPI,  is negative    Physical Exam:   Vitals: /72   Pulse 60   Temp 97.5  F (36.4  C)   Resp 18   Wt 68 kg (150 lb)   SpO2 98%   BMI 26.58 kg/m    BMI= Body mass index is 26.58 kg/m .  GENERAL APPEARANCE:  in no distress, appears healthy  ENT:  Mouth and posterior oropharynx normal, moist mucous membranes  RESP:  lungs clear to auscultation   CV:  peripheral edema 1+ in left thigh and lower leg, rate-normal  ABDOMEN:  bowel sounds normal  M/S:   Gait and station abnormal transfer assist  SKIN:  Inspection of skin and subcutaneous tissue baseline  NEURO:   Examination of sensation by touch normal  PSYCH:  oriented X 3     SNF labs: Labs done in SNF are in [facility] EPIC. Please refer to them using EPIC/Care Everywhere.      DISCHARGE PLAN:    Follow up labs: No labs orders/due    Medical Follow Up:      Follow up  with primary care provider in 1 weeks    MT referral needed and placed by this provider: Yes: WC ordered    Current [facility] scheduled appointments:   NA    Discharge Services: Home Care:  Occupational Therapy, Physical Therapy, Registered Nurse and Home Health Aide    Discharge Instructions Verbalized to Patient at Discharge:     None      TOTAL DISCHARGE TIME:   Greater than 30 minutes  Electronically signed by:  OPAL Burton CNP     Documentation of Face to Face and Certification for Home Health Services    I certify that patient: Ila Loredo is under my care and that I, or a nurse practitioner or physician's assistant working with me, had a face-to-face encounter that meets the physician face-to-face encounter requirements with this patient on: 4/9/2021.    This encounter with the patient was in whole, or in part, for the following medical condition, which is the primary reason for home health care:   1. Type I or II open displaced fracture of left femoral neck (H)    2. S/p left hip fracture    3. Lymphedema of left leg    4. Anemia due to blood loss, acute    5. Hypothyroidism, unspecified type        I certify that, based on my findings, the following services are medically necessary home health services: Nursing, Occupational Therapy, Physical Therapy and HHA.    My clinical findings support the need for the above services because: Nurse is needed: To provide assessment and oversight required in the home to assure adherence to the medical plan due to: pain present, anemia.., Occupational Therapy Services are needed to assess and treat cognitive ability and address ADL safety due to impairment in DME eval, weakness., Physical Therapy Services are needed to assess and treat the following functional impairments: weakness, gait issues. and HHA for bathing safety    Further, I certify that my clinical findings support that this patient is homebound (i.e. absences from home require  considerable and taxing effort and are for medical reasons or [place] services or infrequently or of short duration when for other reasons) because: Requires assistance of another person or specialized equipment to access medical services because patient: Is unable to operate assistive equipment on their own...    Based on the above findings. I certify that this patient is confined to the home and needs intermittent skilled nursing care, physical therapy and/or speech therapy.  The patient is under my care, and I have initiated the establishment of the plan of care.  This patient will be followed by a physician who will periodically review the plan of care.  Physician/Provider to provide follow up care: Trinity Singh    Attending hospital physician (the Medicare certified PEC[facility] provider): OPAL Burton CNP  Physician Signature: See electronic signature associated with these discharge orders.  Date: 4/9/2021                      Sincerely,        OPAL Burton CNP

## 2021-04-14 ENCOUNTER — ASSISTED LIVING VISIT (OUTPATIENT)
Dept: GERIATRICS | Facility: CLINIC | Age: 86
End: 2021-04-14
Payer: COMMERCIAL

## 2021-04-14 VITALS
TEMPERATURE: 95.2 F | BODY MASS INDEX: 29.33 KG/M2 | HEART RATE: 89 BPM | DIASTOLIC BLOOD PRESSURE: 65 MMHG | WEIGHT: 165.5 LBS | RESPIRATION RATE: 18 BRPM | SYSTOLIC BLOOD PRESSURE: 128 MMHG

## 2021-04-14 DIAGNOSIS — S72.002A CLOSED DISPLACED FRACTURE OF LEFT FEMORAL NECK (H): Primary | ICD-10-CM

## 2021-04-14 DIAGNOSIS — D62 ANEMIA DUE TO BLOOD LOSS, ACUTE: ICD-10-CM

## 2021-04-14 DIAGNOSIS — Z96.649 S/P HIP HEMIARTHROPLASTY: ICD-10-CM

## 2021-04-14 DIAGNOSIS — I48.91 ATRIAL FIBRILLATION, UNSPECIFIED TYPE (H): ICD-10-CM

## 2021-04-14 DIAGNOSIS — D72.829 LEUKOCYTOSIS, UNSPECIFIED TYPE: ICD-10-CM

## 2021-04-14 DIAGNOSIS — E03.9 HYPOTHYROIDISM, UNSPECIFIED TYPE: ICD-10-CM

## 2021-04-14 DIAGNOSIS — S91.302D OPEN WOUND OF LEFT HEEL, SUBSEQUENT ENCOUNTER: ICD-10-CM

## 2021-04-14 NOTE — PROGRESS NOTES
"[facility]  PRIMARY CARE PROVIDER AND CLINIC:  Trinity Singh, APRN CNP, [address] / [place] [address]  Chief Complaint   Patient presents with     Hospital F/U     [facility] Medical Record Number:  [id number]  Place of Service where encounter took place:  [facility]  [facility] ([facility]) [[id number]]    Ila Loredo  is a 89 year old  (6/4/1930), admitted to the above facility from [facility], Recent hospital stay at [facility] hospital dates 3/24/21 - 4/9/21..  Admitted to this facility for  medical management and nursing care.    HPI:    HPI information obtained from: facility chart records, facility staff, patient report, [facility] chart review, Care Everywhere Epic chart review and family/first contact , Negro report.     Brief Summary of Hospital Course:     This is a 89-year-old female, with a past medical history significant for chronic leukocytosis, thrombocytopenia, epidural abscess at T11-L3 S/P I&D on 3/23/20 and decompression with bacteremia, atrial fibrillation, left heel wound, lymphedema, hypertension and hypothyroidism, who was admitted to [facility] 3/18/21 through 3/24/21 after a fall and hitting her head. A left femur x-ray revealed \".. There is a subcapital left femoral neck fracture with varus angulation\". A head CT revealed \"1. Left parietal scalp hematoma\". Ortho was consulted and a left hip hemiarthroplasty and bipolar implantation was performed on 3/18/21. Experienced tachycardia post-operatively. PTA Diltiazem was initiated with improvement in heart rate. Experienced mild delirium post-operatively. Hemoglobin 10.5 on 3/18/21 -> 7.0 on 3/20/21 -> 1 U or PRBCs -> 7.0 on 3/21/21 -> 2 U of PRBCs -> 9.4 on 3/24/21. Discharged to [facility] upon hospital discharge.    While at [facility], was on Enoxaparin for DVT prophylaxis, but this was discontinued on 3/31/21 due to drop in " "Hemoglobin. Left lower extremity ultrasound negative for DVT.     Updates on Status Since Skilled nursing Admission:     Today, patient is sitting in wheelchair. States she is \"fine\". \"Hardly any pain at all when sitting\". When trying to do things \"pain isn't small\". Eating ok. Has been using linda lift for transfers. Denies shortness of breath.  is questioning when home care will arrive. Worked with Nova in the past and would like to work with her again. Was seen by Ortho and has follow-up appointment scheduled for 5/14/21. Would be open to having [facility] Ortho MICHELL follow.     CODE STATUS/ADVANCE DIRECTIVES DISCUSSION:   FULL Code - Reviewed with patient and . Agree in change to code status  Patient's living condition: lives in an assisted living facility  ALLERGIES: Codeine  PAST MEDICAL HISTORY:  has no past medical history on file.  PAST SURGICAL HISTORY:   has no past surgical history on file.  FAMILY HISTORY: family history is not on file.  SOCIAL HISTORY:       Post Discharge Medication Reconciliation Status: discharge medications reconciled and changed, per note/orders  Current Outpatient Medications   Medication Sig Dispense Refill     acetaminophen (TYLENOL) 500 MG tablet Take 2 tablets (1,000 mg) by mouth 3 times daily 90 tablet 0     diltiazem ER (TIAZAC) 360 MG 24 hr ER beaded capsule TAKE 1 CAPSULE BY MOUTH ONCE DAILY 28 capsule PRN     levothyroxine (SYNTHROID/LEVOTHROID) 100 MCG tablet TAKE 1 TABLET BY MOUTH ONCE DAILY 31 tablet 97     NYAMYC 874252 UNIT/GM external powder APPLY TOPICALLY TO AFFECTED AREA(S) THREE TIMES DAILY AS NEEDED 60 g 97     polyethylene glycol (MIRALAX) 17 g packet Take 1 packet by mouth as needed for constipation       Wound Dressings (MEDIHONEY WOUND/BURN DRESSING) paste Apply topically daily And PRN 44 mL 97     ROS:  4 point ROS including Respiratory, CV, GI and , other than that noted in the HPI,  is negative    Vitals:  /65   Pulse 89   Temp 95.2 "  F (35.1  C)   Resp 18   Wt 75.1 kg (165 lb 8 oz)   BMI 29.33 kg/m    Exam:  GENERAL APPEARANCE:  Alert, in no distress  ENT:  Mouth and posterior oropharynx normal, moist mucous membranes  EYES:  EOM, conjunctivae, lids, pupils and irises normal  RESP:  respiratory effort and palpation of chest normal, lungs clear to auscultation , no respiratory distress  CV:  Palpation and auscultation of heart done , irregular rhythm and rate  ABDOMEN:  normal bowel sounds, soft, nontender, no hepatosplenomegaly or other masses  M/S:   1+ edema in BLE  SKIN:  Small scab present on left heel  NEURO:   Cranial nerves 2-12 are normal tested and grossly at patient's baseline  PSYCH:  oriented to person and place    Lab/Diagnostic data:  Labs done in SNF are in [place] EPIC. Please refer to them using [employer] (ABAM)/[employer] Everywhere.    ASSESSMENT/PLAN:  Left Femoral Neck Fracture S/P Left Hip Hemiarthroplasty and Bipolar Implantation on 3/18/21. Secondary to a fall. Will ask Ortho PA-C to follow. Scheduled for Ortho follow-up on 5/14/21. On no DVT prophylaxis as Enoxaparin was discontinued due to anemia in TCU. Will check CBC on 4/21/21. If stable, will need to discuss risks versus benefits of anticoagulation with patient and  to determine if appropriate to initiate. Acetaminophen ordered for pain control. Will discontinue Ibuprofen given side effects. Home Physical and Occupational Therapy ordered.    Anemia due to Blood Loss, Acute. Secondary to above. Required PRBCs during hospitalization. Last Hemoglobin 8.1 on 4/5/21. Will repeat CBC on 4/21/21 to ensure stability.    Leukocytosis with History of Epidural Abscess at T11-L3 S/P I&D on 3/23/20 and Decompression with Bacteremia. Culture positive for Strep species consistent with blood culture positive for Streptococcus Group B. Vancomycin was completed on 3/24/20 with continuation of Ceftriaxone until 5/7/20 then Amoxicillin until 8/1/20. Per ID, Dr. Coffey, note on 6/3/20,  overall not improving. Refuses MRI of lumbar spine, but did get CT of lumbar spine on 6/2/20 with no clear infection or enhancement. Peripheral smear on 3/20/20 favored reactive. Afebrile. Recommended patient follow-up with Infectious Disease which patient and  have declined. Last WBC 12.4 on 4/5/21. Will repeat on 4/21/21.      Atrial Fibrillation. With RVR during hospitalization 3/21/20 through 4/10/20 and most recent hospitalization. Previously thought not to be a candidate for anticoagulation given frequent falls, but will need to re-evaluate pending CBC results. Diltiazem last increased on 12/1/20.  Patient and  have declined Cardiology work-up in the past.    Left Heel Wound. Previously healed per Home Health RN. Will order Home Health RN to re-evaluate.    Hypothyroidism. Last TSH 7.38 on 3/29/21. Given TSH is an acute phase reactant, will repeat when patient is stable and further out from hospitalization/surgery.    Orders  Discontinue Ibuprofen  CBC, BMP, Vitamin D on 4/2/21    Electronically signed by:  OPAL Patterson CNP

## 2021-04-14 NOTE — LETTER
"    4/14/2021        RE: Ila Loredo  [facility]  [address]  [address]  [place] [address]        [facility]  PRIMARY CARE PROVIDER AND CLINIC:  Trinity iSngh, APRN CNP, [address] / [place] [address]  Chief Complaint   Patient presents with     Hospital F/U     [facility] Medical Record Number:  [id number]  Place of Service where encounter took place:  [facility] ([facility]) [[id number]]    Ila Loredo  is a 89 year old  (6/4/1930), admitted to the above facility from [facility], Recent hospital stay at [facility] hospital dates 3/24/21 - 4/9/21..  Admitted to this facility for  medical management and nursing care.    HPI:    HPI information obtained from: facility chart records, facility staff, patient report, [facility] Epic chart review, Care Everywhere Epic chart review and family/first contact , Negro report.     Brief Summary of Hospital Course:     This is a 89-year-old female, with a past medical history significant for chronic leukocytosis, thrombocytopenia, epidural abscess at T11-L3 S/P I&D on 3/23/20 and decompression with bacteremia, atrial fibrillation, left heel wound, lymphedema, hypertension and hypothyroidism, who was admitted to [facility] 3/18/21 through 3/24/21 after a fall and hitting her head. A left femur x-ray revealed \".. There is a subcapital left femoral neck fracture with varus angulation\". A head CT revealed \"1. Left parietal scalp hematoma\". Ortho was consulted and a left hip hemiarthroplasty and bipolar implantation was performed on 3/18/21. Experienced tachycardia post-operatively. PTA Diltiazem was initiated with improvement in heart rate. Experienced mild delirium post-operatively. Hemoglobin 10.5 on 3/18/21 -> 7.0 on 3/20/21 -> 1 U or PRBCs -> 7.0 on 3/21/21 -> 2 U of PRBCs -> 9.4 on 3/24/21. [facility] " "[facility].    While at [facility], was on Enoxaparin for DVT prophylaxis, but this was discontinued on 3/31/21 due to drop in Hemoglobin. Left lower extremity ultrasound negative for DVT.     Updates on Status Since Skilled nursing Admission:     Today, patient is sitting in wheelchair. States she is \"fine\". \"Hardly any pain at all when sitting\". When trying to do things \"pain isn't small\". Eating ok. Has been using linda lift for transfers. Denies shortness of breath.  is questioning when home care will arrive. Worked with Nova in the past and would like to work with her again. Was seen by Ortho and has follow-up appointment scheduled for 5/14/21. Would be open to having [facility] Ortho PA-C follow.     CODE STATUS/ADVANCE DIRECTIVES DISCUSSION:   FULL Code - Reviewed with patient and . Agree in change to code status  Patient's living condition: lives in an assisted living facility  ALLERGIES: Codeine  PAST MEDICAL HISTORY:  has no past medical history on file.  PAST SURGICAL HISTORY:   has no past surgical history on file.  FAMILY HISTORY: family history is not on file.  SOCIAL HISTORY:       Post Discharge Medication Reconciliation Status: discharge medications reconciled and changed, per note/orders  Current Outpatient Medications   Medication Sig Dispense Refill     acetaminophen (TYLENOL) 500 MG tablet Take 2 tablets (1,000 mg) by mouth 3 times daily 90 tablet 0     diltiazem ER (TIAZAC) 360 MG 24 hr ER beaded capsule TAKE 1 CAPSULE BY MOUTH ONCE DAILY 28 capsule PRN     levothyroxine (SYNTHROID/LEVOTHROID) 100 MCG tablet TAKE 1 TABLET BY MOUTH ONCE DAILY 31 tablet 97     NYAMYC 021734 UNIT/GM external powder APPLY TOPICALLY TO AFFECTED AREA(S) THREE TIMES DAILY AS NEEDED 60 g 97     polyethylene glycol (MIRALAX) 17 g packet Take 1 packet by mouth as needed for constipation       Wound Dressings (MEDIHONEY WOUND/BURN DRESSING) paste Apply topically daily And PRN 44 mL 97     ROS:  4 point " ROS including Respiratory, CV, GI and , other than that noted in the HPI,  is negative    Vitals:  /65   Pulse 89   Temp 95.2  F (35.1  C)   Resp 18   Wt 75.1 kg (165 lb 8 oz)   BMI 29.33 kg/m    Exam:  GENERAL APPEARANCE:  Alert, in no distress  ENT:  Mouth and posterior oropharynx normal, moist mucous membranes  EYES:  EOM, conjunctivae, lids, pupils and irises normal  RESP:  respiratory effort and palpation of chest normal, lungs clear to auscultation , no respiratory distress  CV:  Palpation and auscultation of heart done , irregular rhythm and rate  ABDOMEN:  normal bowel sounds, soft, nontender, no hepatosplenomegaly or other masses  M/S:   1+ edema in BLE  SKIN:  Small scab present on left heel  NEURO:   Cranial nerves 2-12 are normal tested and grossly at patient's baseline  PSYCH:  oriented to person and place    Lab/Diagnostic data:  Labs done in SNF are in [place] EPIC. Please refer to them using [email or web address] Everywhere.    ASSESSMENT/PLAN:  Left Femoral Neck Fracture S/P Left Hip Hemiarthroplasty and Bipolar Implantation on 3/18/21. Secondary to a fall. Will ask Ortho PA-C to follow. Scheduled for Ortho follow-up on 5/14/21. On no DVT prophylaxis as Enoxaparin was discontinued due to anemia in TCU. Will check CBC on 4/21/21. If stable, will need to discuss risks versus benefits of anticoagulation with patient and  to determine if appropriate to initiate. Acetaminophen ordered for pain control. Will discontinue Ibuprofen given side effects. Home Physical and Occupational Therapy ordered.    Anemia due to Blood Loss, Acute. Secondary to above. Required PRBCs during hospitalization. Last Hemoglobin 8.1 on 4/5/21. Will repeat CBC on 4/21/21 to ensure stability.    Leukocytosis with History of Epidural Abscess at T11-L3 S/P I&D on 3/23/20 and Decompression with Bacteremia. Culture positive for Strep species consistent with blood culture positive for Streptococcus Group B. Vancomycin was  completed on 3/24/20 with continuation of Ceftriaxone until 5/7/20 then Amoxicillin until 8/1/20. Per ID, Dr. Coffey, note on 6/3/20, overall not improving. Refuses MRI of lumbar spine, but did get CT of lumbar spine on 6/2/20 with no clear infection or enhancement. Peripheral smear on 3/20/20 favored reactive. Afebrile. Recommended patient follow-up with Infectious Disease which patient and  have declined. Last WBC 12.4 on 4/5/21. Will repeat on 4/21/21.      Atrial Fibrillation. With RVR during hospitalization 3/21/20 through 4/10/20 and most recent hospitalization. Previously thought not to be a candidate for anticoagulation given frequent falls, but will need to re-evaluate pending CBC results. Diltiazem last increased on 12/1/20.  Patient and  have declined Cardiology work-up in the past.    Left Heel Wound. Previously healed per Home Health RN. Will order Home Health RN to re-evaluate.    Hypothyroidism. Last TSH 7.38 on 3/29/21. Given TSH is an acute phase reactant, will repeat when patient is stable and further out from hospitalization/surgery.    Orders  Discontinue Ibuprofen  CBC, BMP, Vitamin D on 4/2/21    Electronically signed by:  OPAL Patterson CNP                       Sincerely,        OPAL Patterson CNP

## 2021-04-20 ENCOUNTER — RECORDS - HEALTHEAST (OUTPATIENT)
Dept: LAB | Facility: CLINIC | Age: 86
End: 2021-04-20

## 2021-04-21 ENCOUNTER — TRANSFERRED RECORDS (OUTPATIENT)
Dept: HEALTH INFORMATION MANAGEMENT | Facility: CLINIC | Age: 86
End: 2021-04-21

## 2021-04-21 LAB
ANION GAP SERPL CALCULATED.3IONS-SCNC: 10 MMOL/L (ref 5–18)
ANION GAP SERPL CALCULATED.3IONS-SCNC: 10 MMOL/L (ref 5–18)
BASOPHILS # BLD AUTO: 0 THOU/UL (ref 0–0.2)
BASOPHILS NFR BLD AUTO: 0 % (ref 0–2)
BUN SERPL-MCNC: 18 MG/DL (ref 8–28)
BUN SERPL-MCNC: 18 MG/DL (ref 8–28)
CALCIUM SERPL-MCNC: 8.6 MG/DL (ref 8.5–10.5)
CALCIUM SERPL-MCNC: 8.6 MG/DL (ref 8.5–10.5)
CHLORIDE BLD-SCNC: 108 MMOL/L (ref 98–107)
CHLORIDE SERPLBLD-SCNC: 108 MMOL/L (ref 98–107)
CO2 SERPL-SCNC: 21 MMOL/L (ref 22–31)
CO2 SERPL-SCNC: 21 MMOL/L (ref 22–31)
CREAT SERPL-MCNC: 0.74 MG/DL (ref 0.6–1.1)
CREAT SERPL-MCNC: 0.74 MG/DL (ref 0.6–1.1)
DIFFERENTIAL: ABNORMAL
EOSINOPHIL # BLD AUTO: 0 THOU/UL (ref 0–0.4)
EOSINOPHIL NFR BLD AUTO: 0 % (ref 0–6)
ERYTHROCYTE [DISTWIDTH] IN BLOOD BY AUTOMATED COUNT: 17.5 % (ref 11–14.5)
ERYTHROCYTE [DISTWIDTH] IN BLOOD BY AUTOMATED COUNT: 17.5 % (ref 11–14.5)
GFR SERPL CREATININE-BSD FRML MDRD: >60 ML/MIN/1.73M2
GFR SERPL CREATININE-BSD FRML MDRD: >60 ML/MIN/1.73M2
GLUCOSE BLD-MCNC: 108 MG/DL (ref 70–125)
GLUCOSE SERPL-MCNC: 108 MG/DL (ref 70–125)
HCT VFR BLD AUTO: 32.3 % (ref 35–47)
HCT VFR BLD AUTO: 32.3 % (ref 35–47)
HEMOGLOBIN: 10 G/DL (ref 12–16)
HGB BLD-MCNC: 10 G/DL (ref 12–16)
IMM GRANULOCYTES # BLD: 0.1 THOU/UL
IMM GRANULOCYTES NFR BLD: 1 %
LYMPHOCYTES # BLD AUTO: 0.9 THOU/UL (ref 0.8–4.4)
LYMPHOCYTES NFR BLD AUTO: 7 % (ref 20–40)
MCH RBC QN AUTO: 29.3 PG (ref 27–34)
MCH RBC QN AUTO: 29.3 PG (ref 27–34)
MCHC RBC AUTO-ENTMCNC: 31 G/DL (ref 32–36)
MCHC RBC AUTO-ENTMCNC: 31 G/DL (ref 32–36)
MCV RBC AUTO: 95 FL (ref 80–100)
MCV RBC AUTO: 95 FL (ref 80–100)
MONOCYTES # BLD AUTO: 0.8 THOU/UL (ref 0–0.9)
MONOCYTES NFR BLD AUTO: 6 % (ref 2–10)
NEUTROPHILS # BLD AUTO: 11.1 THOU/UL (ref 2–7.7)
NEUTROPHILS NFR BLD AUTO: 86 % (ref 50–70)
PLATELET # BLD AUTO: 220 THOU/UL (ref 140–440)
PLATELET # BLD AUTO: 220 THOU/UL (ref 140–440)
PMV BLD AUTO: 9.9 FL (ref 8.5–12.5)
POTASSIUM BLD-SCNC: 4.1 MMOL/L (ref 3.5–5)
POTASSIUM SERPL-SCNC: 4.1 MMOL/L (ref 3.5–5)
RBC # BLD AUTO: 3.41 MILL/UL (ref 3.8–5.4)
RBC # BLD AUTO: 3.41 MILL/UL (ref 3.8–5.4)
SODIUM SERPL-SCNC: 139 MMOL/L (ref 136–145)
SODIUM SERPL-SCNC: 139 MMOL/L (ref 136–145)
WBC # BLD AUTO: 12.9 THOU/UL (ref 4–11)
WBC: 12.9 THOU/UL (ref 4–11)

## 2021-04-22 LAB — 25(OH)D3 SERPL-MCNC: 17.6 NG/ML (ref 30–80)

## 2021-04-23 ENCOUNTER — DOCUMENTATION ONLY (OUTPATIENT)
Dept: OTHER | Facility: CLINIC | Age: 86
End: 2021-04-23

## 2021-04-28 ENCOUNTER — ASSISTED LIVING VISIT (OUTPATIENT)
Dept: GERIATRICS | Facility: CLINIC | Age: 86
End: 2021-04-28
Payer: COMMERCIAL

## 2021-04-28 VITALS
RESPIRATION RATE: 16 BRPM | SYSTOLIC BLOOD PRESSURE: 116 MMHG | TEMPERATURE: 97.3 F | DIASTOLIC BLOOD PRESSURE: 60 MMHG | WEIGHT: 165.5 LBS | HEART RATE: 71 BPM | BODY MASS INDEX: 29.33 KG/M2

## 2021-04-28 DIAGNOSIS — D62 ANEMIA DUE TO BLOOD LOSS, ACUTE: ICD-10-CM

## 2021-04-28 DIAGNOSIS — D72.829 LEUKOCYTOSIS, UNSPECIFIED TYPE: ICD-10-CM

## 2021-04-28 DIAGNOSIS — S72.002A CLOSED DISPLACED FRACTURE OF LEFT FEMORAL NECK (H): Primary | ICD-10-CM

## 2021-04-28 DIAGNOSIS — Z96.649 S/P HIP HEMIARTHROPLASTY: ICD-10-CM

## 2021-04-28 DIAGNOSIS — I48.91 ATRIAL FIBRILLATION, UNSPECIFIED TYPE (H): ICD-10-CM

## 2021-04-28 DIAGNOSIS — E55.9 VITAMIN D DEFICIENCY: ICD-10-CM

## 2021-04-28 NOTE — LETTER
"    4/28/2021        RE: Ila Loredo  [facility]  [address]  [address] [place] [id number]        [facility]  [facility] Medical Record Number:  [id number]  Place of Service where encounter took place:  [facility] ([facility]) [[id number]]  Chief Complaint   Patient presents with     RECHECK     HPI:    Ila Loredo  is a 89 year old (6/4/1930), who is being seen today for an episodic care visit.  HPI information obtained from: facility chart records, facility staff, patient report, [facility] chart review and family/first contact , Negro report.     This is a 89-year-old female, with a past medical history significant for chronic leukocytosis, thrombocytopenia, epidural abscess at T11-L3 S/P I&D on 3/23/20 and decompression with bacteremia, atrial fibrillation, left heel wound, lymphedema, hypertension and hypothyroidism, who was admitted to [facility] 3/18/21 through 3/24/21 after a fall and hitting her head. A left femur x-ray revealed \".. There is a subcapital left femoral neck fracture with varus angulation\". A head CT revealed \"1. Left parietal scalp hematoma\". Ortho was consulted and a left hip hemiarthroplasty and bipolar implantation was performed on 3/18/21. Experienced tachycardia post-operatively. PTA Diltiazem was initiated with improvement in heart rate. Experienced mild delirium post-operatively. Hemoglobin 10.5 on 3/18/21 -> 7.0 on 3/20/21 -> 1 U or PRBCs -> 7.0 on 3/21/21 -> 2 U of PRBCs -> 9.4 on 3/24/21. [facility]     While at [facility], was on Enoxaparin for DVT prophylaxis, but this was discontinued on 3/31/21 due to drop in Hemoglobin. Left lower extremity ultrasound negative for DVT.      Today's concern is:  Left Femoral Neck Fracture S/P Left Hip Hemiarthroplasty and Bipolar Implantation on 3/18/21. Reports minimal pain at " rest.  questions if the Tylenol 3 times a day is necessary. Would like to cut back on the dosage. Per patient report, would like to keep the Tylenol as is.    Leukocytosis. WBC 12.9 on 4/21/21. Previous WBC 12.4 on 4/5/21.    Atrial Fibrillation. Last week on 4/21/21, while working with Physical Therapy, heart rate noted to be in the 150s. Asymptomatic. Today's vital signs include a heart rate of 71. Per  and patient's report, don't hear much about the elevated heart rate anymore. Question if it's still an issue. Report they were planning to go out to see a Cardiologist prior to her fall, but this was put on hold after her fall.      Vitamin D Deficiency. Vitamin D level 17.6 on 4/21/21. Started on Vitamin D supplement.    Past Medical and Surgical History reviewed in Epic today.    MEDICATIONS:  Current Outpatient Medications   Medication Sig Dispense Refill     acetaminophen (TYLENOL) 500 MG tablet Take 2 tablets (1,000 mg) by mouth 3 times daily 90 tablet 0     diltiazem ER (TIAZAC) 360 MG 24 hr ER beaded capsule TAKE 1 CAPSULE BY MOUTH ONCE DAILY 28 capsule PRN     levothyroxine (SYNTHROID/LEVOTHROID) 100 MCG tablet TAKE 1 TABLET BY MOUTH ONCE DAILY 31 tablet 97     NYAMYC 912736 UNIT/GM external powder APPLY TOPICALLY TO AFFECTED AREA(S) THREE TIMES DAILY AS NEEDED 60 g 97     polyethylene glycol (MIRALAX) 17 g packet Take 1 packet by mouth as needed for constipation       Wound Dressings (MEDIHONEY WOUND/BURN DRESSING) paste Apply topically daily And PRN 44 mL 97     REVIEW OF SYSTEMS:  4 point ROS including Respiratory, CV, GI and , other than that noted in the HPI,  is negative    Objective:  /60   Pulse 71   Temp 97.3  F (36.3  C)   Resp 16   Wt 75.1 kg (165 lb 8 oz)   BMI 29.33 kg/m    Exam:  GENERAL APPEARANCE:  Alert, in no distress  ENT:  Mouth and posterior oropharynx normal, moist mucous membranes  EYES:  EOM, conjunctivae, lids, pupils and irises normal  RESP:  respiratory  effort and palpation of chest normal, lungs clear to auscultation , no respiratory distress  CV:  Palpation and auscultation of heart done , irregular rhythm and rate  ABDOMEN:  normal bowel sounds, soft, nontender, no hepatosplenomegaly or other masses  M/S:  Farrow wraps on BLE.  SKIN:  Small scab present on left heel  NEURO:   Cranial nerves 2-12 are normal tested and grossly at patient's baseline  PSYCH:  oriented to person and place    Labs:   Labs done in SNF are in [facility]. Please refer to them using [facility]/Care Everywhere.    ASSESSMENT/PLAN:  Left Femoral Neck Fracture S/P Left Hip Hemiarthroplasty and Bipolar Implantation on 3/18/21. Secondary to a fall. [facility] Ortho PA-C to follow. Will initiate Apixaban 5 mg PO BID for atrial fibrillation. Acetaminophen ordered for pain control and will keep as is given patient's preference.  Home Physical and Occupational Therapy ordered.     Anemia due to Blood Loss, Acute. Secondary to above. Required PRBCs during hospitalization. Last Hemoglobin 10 on 4/21/21 which is trending up from 8.1 on 4/5/21. Monitor periodically to ensure stability.     Leukocytosis with History of Epidural Abscess at T11-L3 S/P I&D on 3/23/20 and Decompression with Bacteremia. Culture positive for Strep species consistent with blood culture positive for Streptococcus Group B. Vancomycin was completed on 3/24/20 with continuation of Ceftriaxone until 5/7/20 then Amoxicillin until 8/1/20. Per ID, Dr. Coffey, note on 6/3/20, overall not improving. Refuses MRI of lumbar spine, but did get CT of lumbar spine on 6/2/20 with no clear infection or enhancement. Peripheral smear on 3/20/20 favored reactive. Afebrile. Recommended patient follow-up with Infectious Disease which patient and  have declined. Educated on potential causes of elevated WBC. Patient and  decline further work-up at this time.       Atrial Fibrillation. With RVR during hospitalization 3/21/20 through 4/10/20 and  most recent hospitalization. [id number] Score 4 and Hasbled Score 3. Given patient spends most the time in the wheelchair, less of a fall risk recently. Is at higher risk for a clot being more wheelchair bound. Educated patient and  on risks versus benefits for anticoagulation. Both are in agreement with initiating Apixaban 5 mg PO BID. Will check liver function tests prior to initiation. Further plans pending results. Patient and  elect to hold off on Cardiology consult at this time as it is difficult to transport patient.    Left Heel Wound. Home Health RN ordered.     Hypothyroidism. Last TSH 7.38 on 3/29/21. Given TSH is an acute phase reactant, will repeat when patient is stable and further out from hospitalization/surgery.    Vitamin D Deficiency. Continue Vitamin D supplement as ordered. Repeat Vitamin D level in 3 months.    Orders  CMP to check liver function  If CMP WNL, Eliquis 5 mg PO BID    Electronically signed by:  OPAL Patterson CNP               Sincerely,        OPAL Patterson CNP

## 2021-04-28 NOTE — PROGRESS NOTES
"[facility]  [place] Medical Record Number:  [id number]  Place of Service where encounter took place:  [facility] ([facility]) [[id number]]  Chief Complaint   Patient presents with     RECHECK     HPI:    Ila Loredo  is a 89 year old (6/4/1930), who is being seen today for an episodic care visit.  HPI information obtained from: facility chart records, facility staff, patient report, [facility] chart review and family/first contact , Negro report.     This is a 89-year-old female, with a past medical history significant for chronic leukocytosis, thrombocytopenia, epidural abscess at T11-L3 S/P I&D on 3/23/20 and decompression with bacteremia, atrial fibrillation, left heel wound, lymphedema, hypertension and hypothyroidism, who was admitted to [facility] 3/18/21 through 3/24/21 after a fall and hitting her head. A left femur x-ray revealed \".. There is a subcapital left femoral neck fracture with varus angulation\". A head CT revealed \"1. Left parietal scalp hematoma\". Ortho was consulted and a left hip hemiarthroplasty and bipolar implantation was performed on 3/18/21. Experienced tachycardia post-operatively. PTA Diltiazem was initiated with improvement in heart rate. Experienced mild delirium post-operatively. Hemoglobin 10.5 on 3/18/21 -> 7.0 on 3/20/21 -> 1 U or PRBCs -> 7.0 on 3/21/21 -> 2 U of PRBCs -> 9.4 on 3/24/21. [facility].     While at [facility], was on Enoxaparin for DVT prophylaxis, but this was discontinued on 3/31/21 due to drop in Hemoglobin. Left lower extremity ultrasound negative for DVT.      Today's concern is:  Left Femoral Neck Fracture S/P Left Hip Hemiarthroplasty and Bipolar Implantation on 3/18/21. Reports minimal pain at rest.  questions if the Tylenol 3 times a day is necessary. Would like to cut back on the dosage. Per patient report, " would like to keep the Tylenol as is.    Leukocytosis. WBC 12.9 on 4/21/21. Previous WBC 12.4 on 4/5/21.    Atrial Fibrillation. Last week on 4/21/21, while working with Physical Therapy, heart rate noted to be in the 150s. Asymptomatic. Today's vital signs include a heart rate of 71. Per  and patient's report, don't hear much about the elevated heart rate anymore. Question if it's still an issue. Report they were planning to go out to see a Cardiologist prior to her fall, but this was put on hold after her fall.      Vitamin D Deficiency. Vitamin D level 17.6 on 4/21/21. Started on Vitamin D supplement.    Past Medical and Surgical History reviewed in Epic today.    MEDICATIONS:  Current Outpatient Medications   Medication Sig Dispense Refill     acetaminophen (TYLENOL) 500 MG tablet Take 2 tablets (1,000 mg) by mouth 3 times daily 90 tablet 0     diltiazem ER (TIAZAC) 360 MG 24 hr ER beaded capsule TAKE 1 CAPSULE BY MOUTH ONCE DAILY 28 capsule PRN     levothyroxine (SYNTHROID/LEVOTHROID) 100 MCG tablet TAKE 1 TABLET BY MOUTH ONCE DAILY 31 tablet 97     NYAMYC 192472 UNIT/GM external powder APPLY TOPICALLY TO AFFECTED AREA(S) THREE TIMES DAILY AS NEEDED 60 g 97     polyethylene glycol (MIRALAX) 17 g packet Take 1 packet by mouth as needed for constipation       Wound Dressings (MEDIHONEY WOUND/BURN DRESSING) paste Apply topically daily And PRN 44 mL 97     REVIEW OF SYSTEMS:  4 point ROS including Respiratory, CV, GI and , other than that noted in the HPI,  is negative    Objective:  /60   Pulse 71   Temp 97.3  F (36.3  C)   Resp 16   Wt 75.1 kg (165 lb 8 oz)   BMI 29.33 kg/m    Exam:  GENERAL APPEARANCE:  Alert, in no distress  ENT:  Mouth and posterior oropharynx normal, moist mucous membranes  EYES:  EOM, conjunctivae, lids, pupils and irises normal  RESP:  respiratory effort and palpation of chest normal, lungs clear to auscultation , no respiratory distress  CV:  Palpation and auscultation  of heart done , irregular rhythm and rate  ABDOMEN:  normal bowel sounds, soft, nontender, no hepatosplenomegaly or other masses  M/S:  Farrow wraps on BLE.  SKIN:  Small scab present on left heel  NEURO:   Cranial nerves 2-12 are normal tested and grossly at patient's baseline  PSYCH:  oriented to person and place    Labs:   Labs done in SNF are in [place] EPIC. Please refer to them using [facility]/Care Everywhere.    ASSESSMENT/PLAN:  Left Femoral Neck Fracture S/P Left Hip Hemiarthroplasty and Bipolar Implantation on 3/18/21. Secondary to a fall. [facility] PA-C to follow. Will initiate Apixaban 5 mg PO BID for atrial fibrillation. Acetaminophen ordered for pain control and will keep as is given patient's preference.  Home Physical and Occupational Therapy ordered.     Anemia due to Blood Loss, Acute. Secondary to above. Required PRBCs during hospitalization. Last Hemoglobin 10 on 4/21/21 which is trending up from 8.1 on 4/5/21. Monitor periodically to ensure stability.     Leukocytosis with History of Epidural Abscess at T11-L3 S/P I&D on 3/23/20 and Decompression with Bacteremia. Culture positive for Strep species consistent with blood culture positive for Streptococcus Group B. Vancomycin was completed on 3/24/20 with continuation of Ceftriaxone until 5/7/20 then Amoxicillin until 8/1/20. Per ID, Dr. Coffey, note on 6/3/20, overall not improving. Refuses MRI of lumbar spine, but did get CT of lumbar spine on 6/2/20 with no clear infection or enhancement. Peripheral smear on 3/20/20 favored reactive. Afebrile. Recommended patient follow-up with Infectious Disease which patient and  have declined. Educated on potential causes of elevated WBC. Patient and  decline further work-up at this time.       Atrial Fibrillation. With RVR during hospitalization 3/21/20 through 4/10/20 and most recent hospitalization. [id number] Score 4 and Hasbled Score 3. Given patient spends most the time in the wheelchair,  less of a fall risk recently. Is at higher risk for a clot being more wheelchair bound. Educated patient and  on risks versus benefits for anticoagulation. Both are in agreement with initiating Apixaban 5 mg PO BID. Will check liver function tests prior to initiation. Further plans pending results. Patient and  elect to hold off on Cardiology consult at this time as it is difficult to transport patient.    Left Heel Wound. Home Health RN ordered.     Hypothyroidism. Last TSH 7.38 on 3/29/21. Given TSH is an acute phase reactant, will repeat when patient is stable and further out from hospitalization/surgery.    Vitamin D Deficiency. Continue Vitamin D supplement as ordered. Repeat Vitamin D level in 3 months.    Orders  CMP to check liver function  If CMP WNL, Eliquis 5 mg PO BID    Electronically signed by:  OPAL Patterson CNP

## 2021-04-29 ENCOUNTER — RECORDS - HEALTHEAST (OUTPATIENT)
Dept: LAB | Facility: CLINIC | Age: 86
End: 2021-04-29

## 2021-04-30 ENCOUNTER — TRANSFERRED RECORDS (OUTPATIENT)
Dept: HEALTH INFORMATION MANAGEMENT | Facility: CLINIC | Age: 86
End: 2021-04-30

## 2021-04-30 LAB
ALBUMIN SERPL-MCNC: 3 G/DL (ref 3.5–5)
ALBUMIN SERPL-MCNC: 3 G/DL (ref 3.5–5)
ALP SERPL-CCNC: 83 U/L (ref 45–120)
ALP SERPL-CCNC: 83 U/L (ref 45–120)
ALT SERPL W P-5'-P-CCNC: <9 U/L (ref 0–45)
ALT SERPL-CCNC: <9 U/L (ref 0–45)
ANION GAP SERPL CALCULATED.3IONS-SCNC: 8 MMOL/L (ref 5–18)
ANION GAP SERPL CALCULATED.3IONS-SCNC: 8 MMOL/L (ref 5–18)
AST SERPL W P-5'-P-CCNC: 10 U/L (ref 0–40)
AST SERPL-CCNC: 10 U/L (ref 0–40)
BILIRUB SERPL-MCNC: 0.5 MG/DL (ref 0–1)
BILIRUB SERPL-MCNC: 0.5 MG/DL (ref 0–1)
BUN SERPL-MCNC: 17 MG/DL (ref 8–28)
BUN SERPL-MCNC: 17 MG/DL (ref 8–28)
CALCIUM SERPL-MCNC: 8.8 MG/DL (ref 8.5–10.5)
CALCIUM SERPL-MCNC: 8.8 MG/DL (ref 8.5–10.5)
CHLORIDE BLD-SCNC: 107 MMOL/L (ref 98–107)
CHLORIDE SERPLBLD-SCNC: 107 MMOL/L (ref 98–107)
CO2 SERPL-SCNC: 23 MMOL/L (ref 22–31)
CO2 SERPL-SCNC: 23 MMOL/L (ref 22–31)
CREAT SERPL-MCNC: 0.71 MG/DL (ref 0.6–1.1)
CREAT SERPL-MCNC: 0.71 MG/DL (ref 0.6–1.1)
GFR SERPL CREATININE-BSD FRML MDRD: >60 ML/MIN/1.73M2
GFR SERPL CREATININE-BSD FRML MDRD: >60 ML/MIN/1.73M2
GLUCOSE BLD-MCNC: 115 MG/DL (ref 70–125)
GLUCOSE SERPL-MCNC: 115 MG/DL (ref 70–125)
POTASSIUM BLD-SCNC: 4.2 MMOL/L (ref 3.5–5)
POTASSIUM SERPL-SCNC: 4.2 MMOL/L (ref 3.5–5)
PROT SERPL-MCNC: 6.9 G/DL (ref 6–8)
PROT SERPL-MCNC: 6.9 G/DL (ref 6–8)
SODIUM SERPL-SCNC: 138 MMOL/L (ref 136–145)
SODIUM SERPL-SCNC: 138 MMOL/L (ref 136–145)

## 2021-05-03 DIAGNOSIS — S72.002B: ICD-10-CM

## 2021-05-03 DIAGNOSIS — S72.002A CLOSED DISPLACED FRACTURE OF LEFT FEMORAL NECK (H): Primary | ICD-10-CM

## 2021-05-04 RX ORDER — CHOLECALCIFEROL (VITAMIN D3) 25 MCG
TABLET ORAL
Qty: 28 TABLET | Refills: 10 | Status: SHIPPED | OUTPATIENT
Start: 2021-05-04

## 2021-05-04 RX ORDER — PSEUDOEPHED/ACETAMINOPH/DIPHEN 30MG-500MG
TABLET ORAL
Qty: 168 TABLET | Refills: 10 | Status: SHIPPED | OUTPATIENT
Start: 2021-05-04 | End: 2021-05-19

## 2021-05-04 RX ORDER — VITAMIN B COMPLEX
1 TABLET ORAL DAILY
COMMUNITY
End: 2021-05-18

## 2021-05-12 ENCOUNTER — ASSISTED LIVING VISIT (OUTPATIENT)
Dept: GERIATRICS | Facility: CLINIC | Age: 86
End: 2021-05-12
Payer: COMMERCIAL

## 2021-05-12 VITALS
HEART RATE: 71 BPM | DIASTOLIC BLOOD PRESSURE: 60 MMHG | WEIGHT: 153.8 LBS | SYSTOLIC BLOOD PRESSURE: 116 MMHG | TEMPERATURE: 94.4 F | RESPIRATION RATE: 16 BRPM | BODY MASS INDEX: 27.25 KG/M2

## 2021-05-12 DIAGNOSIS — Z87.81 S/P LEFT HIP FRACTURE: ICD-10-CM

## 2021-05-12 DIAGNOSIS — I48.91 ATRIAL FIBRILLATION, UNSPECIFIED TYPE (H): ICD-10-CM

## 2021-05-12 DIAGNOSIS — R60.0 LOCALIZED EDEMA: Primary | ICD-10-CM

## 2021-05-12 NOTE — PROGRESS NOTES
"[facility]    Chief Complaint   Patient presents with     RECHECK     HPI:  Ila Loredo is a 89 year old  (6/4/1930), who is being seen today for an episodic care visit at: [facility] (Replaced by [facility]) [[id number]].     This is a 89-year-old female, with a past medical history significant for chronic leukocytosis, thrombocytopenia, epidural abscess at T11-L3 S/P I&D on 3/23/20 and decompression with bacteremia, atrial fibrillation, left heel wound, lymphedema, hypertension and hypothyroidism, who was admitted to [facility] 3/18/21 through 3/24/21 after a fall and hitting her head. A left femur x-ray revealed \".. There is a subcapital left femoral neck fracture with varus angulation\". A head CT revealed \"1. Left parietal scalp hematoma\". Ortho was consulted and a left hip hemiarthroplasty and bipolar implantation was performed on 3/18/21. Experienced tachycardia post-operatively. PTA Diltiazem was initiated with improvement in heart rate. Experienced mild delirium post-operatively. Hemoglobin 10.5 on 3/18/21 -> 7.0 on 3/20/21 -> 1 U or PRBCs -> 7.0 on 3/21/21 -> 2 U of PRBCs -> 9.4 on 3/24/21. [facility].     [facility], was on Enoxaparin for DVT prophylaxis, but this was discontinued on 3/31/21 due to drop in Hemoglobin. Left lower extremity ultrasound negative for DVT.     Today's concern is:     Bilateral Lower Extremity Edema. On 5/10/21, was started on Furosemide 40 mg PO every day x 3 days per OT's request due to worsening edema and difficulty with mobility. Edema up to knees and thighs. Wearing compression stockings. Weight 153.8 lbs on 5/6/21. Today, patient notes she is urinating more frequently. Does not like that. Has noticed her legs are less swollen. Denies shortness of breath.    Atrial Fibrillation. On 5/3/21, Eliquis was initiated after discussion with patient and " . When medication was ordered from the pharmacy, it was expensive per staff report. Patient and  elected not to order medication due to cost. Today,  and patient confirm declining Eliquis due to cost. State she's had atrial fibrillation for sometime and was never told to take a medication. Therapy has not told them of elevated heart rates when working with them. Plans to follow-up with Cardiology in a month or so when patient is stronger and can get out for an appointment.     Left Femoral Neck Fracture S/P Left Hip Hemiarthroplasty and Bipolar Implantation on 3/18/21. No reports of pain. Is working with therapy today and is doing well per PTA's report. Transferring from the toilet to wheelchair.     Allergies, and PMH/PSH reviewed in EPIC today.  REVIEW OF SYSTEMS:  4 point ROS including Respiratory, CV, GI and , other than that noted in the HPI,  is negative    Objective:   /60   Pulse 71   Temp 94.4  F (34.7  C)   Resp 16   Wt 69.8 kg (153 lb 12.8 oz)   BMI 27.25 kg/m    GENERAL APPEARANCE:  Alert, in no distress  ENT:  Mouth and posterior oropharynx normal, moist mucous membranes  EYES:  EOM, conjunctivae, lids, pupils and irises normal  RESP:  respiratory effort and palpation of chest normal, lungs clear to auscultation , no respiratory distress  CV:  Palpation and auscultation of heart done , irregular rhythm and rate  ABDOMEN:  normal bowel sounds, soft, nontender, no hepatosplenomegaly or other masses  M/S:  Farrow wraps on BLE.  SKIN:  Inspection at baseline  NEURO:   Cranial nerves 2-12 are normal tested and grossly at patient's baseline  PSYCH:  oriented to person and place    [facility]. Please refer to them using [employer]/Care Everywhere.    Assessment/Plan:  Bilateral Lower Extremity Edema. Improved with initiation of Furosemide 40 mg PO every day. Reviewed risks versus benefits of Furosemide with patient and  including frequent urination.  Patient would like to make decision as to whether to continue diuretic after talking with OT tomorrow and seeing measurements of her legs. ADDENDUM 5/13/21: Received report from OT patient would like to continue on Furosemide 40 mg PO every day as edema has improved girth in legs, mobility has improved and weight is now 148.8 lbs.    Atrial Fibrillation. With RVR during hospitalization 3/21/20 through 4/10/20 and most recent hospitalization. [id number] Score 4 and Hasbled Score 3. Given patient spends most the time in the wheelchair, less of a fall risk recently. Is at higher risk for a clot being primarily wheelchair bound. Educated patient and  on risks versus benefits of anticoagulation. Both were in agreement with initiating Apixaban 5 mg PO BID, but later declined due to cost. Not interested in looking into alternate treatment such as Warfarin. Patient and  elect to hold off until Cardiology consult in a month or so when patient's mobility has improved and transports more easily.     Left Femoral Neck Fracture S/P Left Hip Hemiarthroplasty and Bipolar Implantation on 3/18/21. Secondary to a fall. [facility] PA-C to follow. Acetaminophen ordered for pain control and will keep as is given patient's preference.  Is no longer on anticoagulation for DVT prophylaxis. Home Physical and Occupational Therapy ordered.     Anemia due to Blood Loss, Acute. Secondary to above. Required PRBCs during hospitalization. Last Hemoglobin 10 on 4/21/21 which is trending up from 8.1 on 4/5/21. Monitor periodically to ensure stability.     Leukocytosis with History of Epidural Abscess at T11-L3 S/P I&D on 3/23/20 and Decompression with Bacteremia. Culture positive for Strep species consistent with blood culture positive for Streptococcus Group B. Per ID, Dr. Coffey, note on 6/3/20, overall not improving. Refused MRI of lumbar spine, but did get CT of lumbar spine on 6/2/20 with no clear infection or enhancement.  Peripheral smear on 3/20/20 favored reactive. Afebrile. Recommended patient follow-up with Infectious Disease which patient and  have declined during previous visits.     Left Heel Wound. Home Health RN ordered.     Hypothyroidism. Last TSH 7.38 on 3/29/21. Given TSH is an acute phase reactant, will repeat when patient is stable and further out from hospitalization/surgery.     Vitamin D Deficiency. Vitamin D level 17.6 on 4/21/21. Continue Vitamin D supplement as ordered. Repeat Vitamin D level in 3 months.     Orders:  None    Electronically signed by: OPAL Patterson CNP

## 2021-05-12 NOTE — LETTER
"    5/12/2021        RE: Ila Loredo  [facility]  [address]  [address]  [place] [address]         HEALTH GERIATRIC SERVICES    Chief Complaint   Patient presents with     RECHECK     HPI:  Ila Loredo is a 89 year old  (6/4/1930), who is being seen today for an episodic care visit at: [facility] ([facility]) [[id number]].     This is a 89-year-old female, with a past medical history significant for chronic leukocytosis, thrombocytopenia, epidural abscess at T11-L3 S/P I&D on 3/23/20 and decompression with bacteremia, atrial fibrillation, left heel wound, lymphedema, hypertension and hypothyroidism, who was admitted to [facility] 3/18/21 through 3/24/21 after a fall and hitting her head. A left femur x-ray revealed \".. There is a subcapital left femoral neck fracture with varus angulation\". A head CT revealed \"1. Left parietal scalp hematoma\". Ortho was consulted and a left hip hemiarthroplasty and bipolar implantation was performed on 3/18/21. Experienced tachycardia post-operatively. PTA Diltiazem was initiated with improvement in heart rate. Experienced mild delirium post-operatively. Hemoglobin 10.5 on 3/18/21 -> 7.0 on 3/20/21 -> 1 U or PRBCs -> 7.0 on 3/21/21 -> 2 U of PRBCs -> 9.4 on 3/24/21. [facility].     [facility], was on Enoxaparin for DVT prophylaxis, but this was discontinued on 3/31/21 due to drop in Hemoglobin. Left lower extremity ultrasound negative for DVT.     Today's concern is:     Bilateral Lower Extremity Edema. On 5/10/21, was started on Furosemide 40 mg PO every day x 3 days per OT's request due to worsening edema and difficulty with mobility. Edema up to knees and thighs. Wearing compression stockings. Weight 153.8 lbs on 5/6/21. Today, patient notes she is urinating more frequently. Does not like that. Has noticed her legs are less " swollen. Denies shortness of breath.    Atrial Fibrillation. On 5/3/21, Eliquis was initiated after discussion with patient and . When medication was ordered from the pharmacy, it was expensive per staff report. Patient and  elected not to order medication due to cost. Today,  and patient confirm declining Eliquis due to cost. State she's had atrial fibrillation for sometime and was never told to take a medication. Therapy has not told them of elevated heart rates when working with them. Plans to follow-up with Cardiology in a month or so when patient is stronger and can get out for an appointment.     Left Femoral Neck Fracture S/P Left Hip Hemiarthroplasty and Bipolar Implantation on 3/18/21. No reports of pain. Is working with therapy today and is doing well per PTA's report. Transferring from the toilet to wheelchair.     Allergies, and PMH/PSH reviewed in EPIC today.  REVIEW OF SYSTEMS:  4 point ROS including Respiratory, CV, GI and , other than that noted in the HPI,  is negative    Objective:   /60   Pulse 71   Temp 94.4  F (34.7  C)   Resp 16   Wt 69.8 kg (153 lb 12.8 oz)   BMI 27.25 kg/m    GENERAL APPEARANCE:  Alert, in no distress  ENT:  Mouth and posterior oropharynx normal, moist mucous membranes  EYES:  EOM, conjunctivae, lids, pupils and irises normal  RESP:  respiratory effort and palpation of chest normal, lungs clear to auscultation , no respiratory distress  CV:  Palpation and auscultation of heart done , irregular rhythm and rate  ABDOMEN:  normal bowel sounds, soft, nontender, no hepatosplenomegaly or other masses  M/S:  Farrow wraps on BLE.  SKIN:  Inspection at baseline  NEURO:   Cranial nerves 2-12 are normal tested and grossly at patient's baseline  PSYCH:  oriented to person and place    Labs done in SNF are in [place] EPIC. Please refer to them using [employer]/Care Everywhere.    Assessment/Plan:  Bilateral Lower Extremity Edema. Improved with initiation of  Furosemide 40 mg PO every day. Reviewed risks versus benefits of Furosemide with patient and  including frequent urination. Patient would like to make decision as to whether to continue diuretic after talking with OT tomorrow and seeing measurements of her legs. ADDENDUM 5/13/21: Received report from OT patient would like to continue on Furosemide 40 mg PO every day as edema has improved girth in legs, mobility has improved and weight is now 148.8 lbs.    Atrial Fibrillation. With RVR during hospitalization 3/21/20 through 4/10/20 and most recent hospitalization. [id number] Score 4 and Hasbled Score 3. Given patient spends most the time in the wheelchair, less of a fall risk recently. Is at higher risk for a clot being primarily wheelchair bound. Educated patient and  on risks versus benefits of anticoagulation. Both were in agreement with initiating Apixaban 5 mg PO BID, but later declined due to cost. Not interested in looking into alternate treatment such as Warfarin. Patient and  elect to hold off until Cardiology consult in a month or so when patient's mobility has improved and transports more easily.     Left Femoral Neck Fracture S/P Left Hip Hemiarthroplasty and Bipolar Implantation on 3/18/21. Secondary to a fall. [facility] PA-C to follow. Acetaminophen ordered for pain control and will keep as is given patient's preference.  Is no longer on anticoagulation for DVT prophylaxis. Home Physical and Occupational Therapy ordered.     Anemia due to Blood Loss, Acute. Secondary to above. Required PRBCs during hospitalization. Last Hemoglobin 10 on 4/21/21 which is trending up from 8.1 on 4/5/21. Monitor periodically to ensure stability.     Leukocytosis with History of Epidural Abscess at T11-L3 S/P I&D on 3/23/20 and Decompression with Bacteremia. Culture positive for Strep species consistent with blood culture positive for Streptococcus Group B. Per ID, Dr. Coffey, note on 6/3/20, overall  not improving. Refused MRI of lumbar spine, but did get CT of lumbar spine on 6/2/20 with no clear infection or enhancement. Peripheral smear on 3/20/20 favored reactive. Afebrile. Recommended patient follow-up with Infectious Disease which patient and  have declined during previous visits.     Left Heel Wound. Home Health RN ordered.     Hypothyroidism. Last TSH 7.38 on 3/29/21. Given TSH is an acute phase reactant, will repeat when patient is stable and further out from hospitalization/surgery.     Vitamin D Deficiency. Vitamin D level 17.6 on 4/21/21. Continue Vitamin D supplement as ordered. Repeat Vitamin D level in 3 months.     Orders:  None    Electronically signed by: OPAL Patterson CNP             Sincerely,        OPAL Patterson CNP

## 2021-05-13 ENCOUNTER — ASSISTED LIVING VISIT (OUTPATIENT)
Dept: GERIATRICS | Facility: CLINIC | Age: 86
End: 2021-05-13
Payer: COMMERCIAL

## 2021-05-13 DIAGNOSIS — S72.002A CLOSED DISPLACED FRACTURE OF LEFT FEMORAL NECK (H): Primary | ICD-10-CM

## 2021-05-13 NOTE — PROGRESS NOTES
Ortho Nursing home visit    Ila Loredo is a 89 year old female who resides at [facility]    Patient is seen today for Left hip post-op visit on-site, patient now 6 weeks S/P Left austin-arthroplasty for Femoral neck fracture.      No past medical history on file.   No past surgical history on file.     Allergies   Allergen Reactions     Codeine       There were no vitals taken for this visit.     Exam: Alert cooperative female, seated in W/C , able to stand with asst; Of 1 incision healed, some noted edema, left LE< neg homans, per staff, working towards WBAT with walker, in room,      X-rays not done today, per patient they were taken at facility, I will check on results,    ASSESSMENT / PLAN:  No further appt; needed at [facility]; I will continue to follow on-site for cares;      [id number]          [facility]  [phone] Cell

## 2021-05-17 DIAGNOSIS — I89.0 LYMPHEDEMA OF LEFT LEG: Primary | ICD-10-CM

## 2021-05-17 RX ORDER — FUROSEMIDE 40 MG
TABLET ORAL
Qty: 28 TABLET | Refills: 10 | Status: SHIPPED | OUTPATIENT
Start: 2021-05-17 | End: 2021-06-05

## 2021-05-18 ENCOUNTER — RECORDS - HEALTHEAST (OUTPATIENT)
Dept: LAB | Facility: CLINIC | Age: 86
End: 2021-05-18

## 2021-05-19 ENCOUNTER — TELEPHONE (OUTPATIENT)
Dept: GERIATRICS | Facility: CLINIC | Age: 86
End: 2021-05-19

## 2021-05-19 ENCOUNTER — TRANSFERRED RECORDS (OUTPATIENT)
Dept: HEALTH INFORMATION MANAGEMENT | Facility: CLINIC | Age: 86
End: 2021-05-19

## 2021-05-19 DIAGNOSIS — S72.002B: ICD-10-CM

## 2021-05-19 LAB
ANION GAP SERPL CALCULATED.3IONS-SCNC: 11 MMOL/L (ref 5–18)
ANION GAP SERPL CALCULATED.3IONS-SCNC: 11 MMOL/L (ref 5–18)
BUN SERPL-MCNC: 20 MG/DL (ref 8–28)
BUN SERPL-MCNC: 20 MG/DL (ref 8–28)
CALCIUM SERPL-MCNC: 9.1 MG/DL (ref 8.5–10.5)
CALCIUM SERPL-MCNC: 9.1 MG/DL (ref 8.5–10.5)
CHLORIDE BLD-SCNC: 102 MMOL/L (ref 98–107)
CHLORIDE SERPLBLD-SCNC: 102 MMOL/L (ref 98–107)
CO2 SERPL-SCNC: 26 MMOL/L (ref 22–31)
CO2 SERPL-SCNC: 26 MMOL/L (ref 22–31)
CREAT SERPL-MCNC: 0.91 MG/DL (ref 0.6–1.1)
CREAT SERPL-MCNC: 0.91 MG/DL (ref 0.6–1.1)
GFR SERPL CREATININE-BSD FRML MDRD: 58 ML/MIN/1.73M2
GFR SERPL CREATININE-BSD FRML MDRD: 58 ML/MIN/1.73M2
GLUCOSE BLD-MCNC: 111 MG/DL (ref 70–125)
GLUCOSE SERPL-MCNC: 111 MG/DL (ref 70–125)
POTASSIUM BLD-SCNC: 3.9 MMOL/L (ref 3.5–5)
POTASSIUM SERPL-SCNC: 3.9 MMOL/L (ref 3.5–5)
SODIUM SERPL-SCNC: 139 MMOL/L (ref 136–145)
SODIUM SERPL-SCNC: 139 MMOL/L (ref 136–145)

## 2021-05-19 RX ORDER — ACETAMINOPHEN 500 MG
1000 TABLET ORAL AT BEDTIME
Qty: 168 TABLET | Refills: 10
Start: 2021-05-19 | End: 2021-06-15

## 2021-05-19 NOTE — TELEPHONE ENCOUNTER
[facility] [facility] TELEPHONE ENCOUNTER    Chief Complaint   Patient presents with     Medication Request     Ila Loredo is a 89 year old  (6/4/1930), , Negro, called today to report that patient is no longer having pain. Requests to change Tylenol to as needed during the day and scheduled at night    ASSESSMENT/PLAN  Recent  Left Femoral Neck Fracture S/P Left Hip Hemiarthroplasty and Bipolar Implantation on 3/18/21.     Ok to change Acetaminophen to 1000 mg PO at bedtime and BID PRN Dx: Pain    Electronically signed by:   OPAL Patterson CNP

## 2021-06-02 ENCOUNTER — ASSISTED LIVING VISIT (OUTPATIENT)
Dept: GERIATRICS | Facility: CLINIC | Age: 86
End: 2021-06-02
Payer: COMMERCIAL

## 2021-06-02 VITALS
TEMPERATURE: 96.3 F | HEART RATE: 97 BPM | DIASTOLIC BLOOD PRESSURE: 64 MMHG | WEIGHT: 148 LBS | SYSTOLIC BLOOD PRESSURE: 128 MMHG | RESPIRATION RATE: 16 BRPM | BODY MASS INDEX: 26.23 KG/M2

## 2021-06-02 DIAGNOSIS — I89.0 LYMPHEDEMA OF LEFT LEG: Primary | ICD-10-CM

## 2021-06-02 DIAGNOSIS — R32 URINARY INCONTINENCE, UNSPECIFIED TYPE: ICD-10-CM

## 2021-06-02 DIAGNOSIS — I89.0 LYMPHEDEMA OF LEFT LEG: ICD-10-CM

## 2021-06-02 DIAGNOSIS — Z87.81 S/P LEFT HIP FRACTURE: Primary | ICD-10-CM

## 2021-06-02 RX ORDER — FUROSEMIDE 20 MG
TABLET ORAL
Qty: 28 TABLET | Refills: 10 | Status: SHIPPED | OUTPATIENT
Start: 2021-06-02 | End: 2023-01-01

## 2021-06-02 NOTE — PROGRESS NOTES
"[facility] GERIATRIC SERVICES    Chief Complaint   Patient presents with     RECHECK     HPI:  Ila Loredo is a 89 year old  (6/4/1930), who is being seen today for an episodic care visit at: [facility] ([facility]ly [facility]) [[id number]].     Background:    This is a 89-year-old female, with a past medical history significant for chronic leukocytosis, thrombocytopenia, epidural abscess at T11-L3 S/P I&D on 3/23/20 and decompression with bacteremia, atrial fibrillation, left heel wound, lymphedema, hypertension and hypothyroidism, who was admitted to [facility] 3/18/21 through 3/24/21 after a fall and hitting her head. A left femur x-ray revealed \".. There is a subcapital left femoral neck fracture with varus angulation\". A head CT revealed \"1. Left parietal scalp hematoma\". Ortho was consulted and a left hip hemiarthroplasty and bipolar implantation was performed on 3/18/21. Experienced tachycardia post-operatively. PTA Diltiazem was initiated with improvement in heart rate. Experienced mild delirium post-operatively. Hemoglobin 10.5 on 3/18/21 -> 7.0 on 3/20/21 -> 1 U or PRBCs -> 7.0 on 3/21/21 -> 2 U of PRBCs -> 9.4 on 3/24/21. [facility].     While at [facility], was on Enoxaparin for DVT prophylaxis, but this was discontinued on 3/31/21 due to drop in Hemoglobin. Left lower extremity ultrasound negative for DVT    Today's concern is:     Bilateral Lower Extremity Edema and Urinary Incontinence. Received notification from [facility] patient's weight decreased with initiation of Furosemide by 9.4 lbs and circumferential measurements of her legs have gone down: Right 5%, Left 6%. Patient is bothered by the increased urination as a result of the diuretic.Has also been frustrated with her compression garments and wasn't wearing them. Wearing Tubigrip under her Velcro compression garments. Today, " patient reports she does not like urinating so frequently. Pad requires frequent changing. Cannot tell when she has to urinate. Would like to try a lower dose of the diuretic.    Left Femoral Neck Fracture S/P Left Hip Hemiarthroplasty and Bipolar Implantation on 3/18/21. Acetaminophen changed to as needed during the day and scheduled at night on 5/19/21. Since that time, patient reports no increase in pain.    Allergies, and PMH/PSH reviewed in EPIC today.  REVIEW OF SYSTEMS:  4 point ROS including Respiratory, CV, GI and , other than that noted in the HPI,  is negative    Objective:   /64   Pulse 97   Temp 96.3  F (35.7  C)   Resp 16   Wt 67.1 kg (148 lb)   BMI 26.23 kg/m    GENERAL APPEARANCE:  Alert, in no distress  ENT:  Mouth and posterior oropharynx normal, moist mucous membranes  EYES:  EOM, conjunctivae, lids, pupils and irises normal  RESP:  respiratory effort and palpation of chest normal, lungs clear to auscultation , no respiratory distress  CV:  Palpation and auscultation of heart done , irregular rhythm and rate  ABDOMEN:  normal bowel sounds, soft, nontender, no hepatosplenomegaly or other masses  M/S: Wraps on BLE.  SKIN:  Inspection at baseline  NEURO:   Cranial nerves 2-12 are normal tested and grossly at patient's baseline  PSYCH:  oriented to person and place    [facility]F [facility]. Please refer to them using [facility]/Care Everywhere.    Assessment/Plan:  Bilateral Lower Extremity Edema. Improved with initiation of Furosemide 40 mg PO every day. As patient does not like increased urination since taking diuretic, will decrease dose to Furosemide 20 mg PO every day. Completed Home Occupational Therapy. Continue Tubigrip and Velcro Compression as ordered.    Urinary Incontinence. Chronic. Following today's visit, was approached by staff about trialing a Pure Wick for patient. Prescription given to staff to discuss with patient and  Pure Wick system.      Atrial  Fibrillation. With RVR during hospitalization 3/21/20 through 4/10/20 and most recent hospitalization. [id number] Score 4 and Hasbled Score 3. Given patient spends most the time in the wheelchair, less of a fall risk recently. Is at higher risk for a clot being primarily wheelchair bound. Educated patient and  on risks versus benefits of anticoagulation. Both were in agreement with initiating Apixaban 5 mg PO BID, but later declined due to cost. Not interested in looking into alternate treatment such as Warfarin. Patient and  elect to hold off until Cardiology consult in a month or so when patient's mobility has improved and transports more easily.      Left Femoral Neck Fracture S/P Left Hip Hemiarthroplasty and Bipolar Implantation on 3/18/21. Secondary to a fall. [facility] PA-C to follow. Acetaminophen scheduled at bedtime and available as needed for pain control.  Is no longer on anticoagulation for DVT prophylaxis. Completed Home Occupational Therapy. Working with Home Physical Therapy.      Anemia due to Blood Loss, Acute. Secondary to above. Required PRBCs during hospitalization. Last Hemoglobin 10 on 4/21/21 which is trending up from 8.1 on 4/5/21. Monitor periodically to ensure stability.     Leukocytosis with History of Epidural Abscess at T11-L3 S/P I&D on 3/23/20 and Decompression with Bacteremia. Culture positive for Strep species consistent with blood culture positive for Streptococcus Group B. Per ID, Dr. Coffey, note on 6/3/20, overall not improving. Refused MRI of lumbar spine, but did get CT of lumbar spine on 6/2/20 with no clear infection or enhancement. Peripheral smear on 3/20/20 favored reactive. Afebrile. Recommended patient follow-up with Infectious Disease which patient and  have declined during previous visits.      Hypothyroidism. Last TSH 7.38 on 3/29/21. Given TSH is an acute phase reactant, may be elevated at that time due to recent hospitalization/surgery. Given  age, TSH is appropriate.      Vitamin D Deficiency. Vitamin D level 17.6 on 4/21/21. Continue Vitamin D supplement as ordered. Repeat Vitamin D level in 3 months.    Orders:  Decrease Furosemide to 20 mg PO every day  Prescription given to staff for Pure Wick     Electronically signed by: OPAL Patterson CNP

## 2021-06-02 NOTE — LETTER
"    6/2/2021        RE: Ila Loredo  [facility]  [address]  [address]  [place] [address]         HEALTH GERIATRIC SERVICES    Chief Complaint   Patient presents with     RECHECK     HPI:  Ila Loredo is a 89 year old  (6/4/1930), who is being seen today for an episodic care visit at: [facility] ([facility]) [[id number]].     Background:    This is a 89-year-old female, with a past medical history significant for chronic leukocytosis, thrombocytopenia, epidural abscess at T11-L3 S/P I&D on 3/23/20 and decompression with bacteremia, atrial fibrillation, left heel wound, lymphedema, hypertension and hypothyroidism, who was admitted to [facility] 3/18/21 through 3/24/21 after a fall and hitting her head. A left femur x-ray revealed \".. There is a subcapital left femoral neck fracture with varus angulation\". A head CT revealed \"1. Left parietal scalp hematoma\". Ortho was consulted and a left hip hemiarthroplasty and bipolar implantation was performed on 3/18/21. Experienced tachycardia post-operatively. PTA Diltiazem was initiated with improvement in heart rate. Experienced mild delirium post-operatively. Hemoglobin 10.5 on 3/18/21 -> 7.0 on 3/20/21 -> 1 U or PRBCs -> 7.0 on 3/21/21 -> 2 U of PRBCs -> 9.4 on 3/24/21. [facility].     While at [facility], was on Enoxaparin for DVT prophylaxis, but this was discontinued on 3/31/21 due to drop in Hemoglobin. Left lower extremity ultrasound negative for DVT    Today's concern is:     Bilateral Lower Extremity Edema and Urinary Incontinence. Received notification from [facility] patient's weight decreased with initiation of Furosemide by 9.4 lbs and circumferential measurements of her legs have gone down: Right 5%, Left 6%. Patient is bothered by the increased urination as a result of the diuretic.Has also been " frustrated with her compression garments and wasn't wearing them. Wearing Tubigrip under her Velcro compression garments. Today, patient reports she does not like urinating so frequently. Pad requires frequent changing. Cannot tell when she has to urinate. Would like to try a lower dose of the diuretic.    Left Femoral Neck Fracture S/P Left Hip Hemiarthroplasty and Bipolar Implantation on 3/18/21. Acetaminophen changed to as needed during the day and scheduled at night on 5/19/21. Since that time, patient reports no increase in pain.    Allergies, and PMH/PSH reviewed in EPIC today.  REVIEW OF SYSTEMS:  4 point ROS including Respiratory, CV, GI and , other than that noted in the HPI,  is negative    Objective:   /64   Pulse 97   Temp 96.3  F (35.7  C)   Resp 16   Wt 67.1 kg (148 lb)   BMI 26.23 kg/m    GENERAL APPEARANCE:  Alert, in no distress  ENT:  Mouth and posterior oropharynx normal, moist mucous membranes  EYES:  EOM, conjunctivae, lids, pupils and irises normal  RESP:  respiratory effort and palpation of chest normal, lungs clear to auscultation , no respiratory distress  CV:  Palpation and auscultation of heart done , irregular rhythm and rate  ABDOMEN:  normal bowel sounds, soft, nontender, no hepatosplenomegaly or other masses  M/S: Wraps on BLE.  SKIN:  Inspection at baseline  NEURO:   Cranial nerves 2-12 are normal tested and grossly at patient's baseline  PSYCH:  oriented to person and place    Labs done in SNF are in [facility]. Please refer to them using [facility]/Care Everywhere.    Assessment/Plan:  Bilateral Lower Extremity Edema. Improved with initiation of Furosemide 40 mg PO every day. As patient does not like increased urination since taking diuretic, will decrease dose to Furosemide 20 mg PO every day. Completed Home Occupational Therapy. Continue Tubigrip and Velcro Compression as ordered.    Urinary Incontinence. Chronic. Following today's visit, was approached by staff about  trialing a Pure Wick for patient. Prescription given to staff to discuss with patient and  Pure Wick system.      Atrial Fibrillation. With RVR during hospitalization 3/21/20 through 4/10/20 and most recent hospitalization. [id number] Score 4 and Hasbled Score 3. Given patient spends most the time in the wheelchair, less of a fall risk recently. Is at higher risk for a clot being primarily wheelchair bound. Educated patient and  on risks versus benefits of anticoagulation. Both were in agreement with initiating Apixaban 5 mg PO BID, but later declined due to cost. Not interested in looking into alternate treatment such as Warfarin. Patient and  elect to hold off until Cardiology consult in a month or so when patient's mobility has improved and transports more easily.      Left Femoral Neck Fracture S/P Left Hip Hemiarthroplasty and Bipolar Implantation on 3/18/21. Secondary to a fall. [facility] PA-C to follow. Acetaminophen scheduled at bedtime and available as needed for pain control.  Is no longer on anticoagulation for DVT prophylaxis. Completed Home Occupational Therapy. Working with Home Physical Therapy.      Anemia due to Blood Loss, Acute. Secondary to above. Required PRBCs during hospitalization. Last Hemoglobin 10 on 4/21/21 which is trending up from 8.1 on 4/5/21. Monitor periodically to ensure stability.     Leukocytosis with History of Epidural Abscess at T11-L3 S/P I&D on 3/23/20 and Decompression with Bacteremia. Culture positive for Strep species consistent with blood culture positive for Streptococcus Group B. Per ID, Dr. Coffey, note on 6/3/20, overall not improving. Refused MRI of lumbar spine, but did get CT of lumbar spine on 6/2/20 with no clear infection or enhancement. Peripheral smear on 3/20/20 favored reactive. Afebrile. Recommended patient follow-up with Infectious Disease which patient and  have declined during previous visits.      Hypothyroidism. Last TSH  7.38 on 3/29/21. Given TSH is an acute phase reactant, may be elevated at that time due to recent hospitalization/surgery. Given age, TSH is appropriate.      Vitamin D Deficiency. Vitamin D level 17.6 on 4/21/21. Continue Vitamin D supplement as ordered. Repeat Vitamin D level in 3 months.    Orders:  Decrease Furosemide to 20 mg PO every day  Prescription given to staff for Pure Wick     Electronically signed by: OPAL Patterson CNP             Sincerely,        OPAL Patterson CNP

## 2021-06-07 DIAGNOSIS — S72.002B: ICD-10-CM

## 2021-06-15 RX ORDER — PSEUDOEPHED/ACETAMINOPH/DIPHEN 30MG-500MG
TABLET ORAL
Qty: 56 TABLET | Refills: 10 | Status: SHIPPED | OUTPATIENT
Start: 2021-06-15

## 2021-06-25 ENCOUNTER — TRANSFERRED RECORDS (OUTPATIENT)
Dept: HEALTH INFORMATION MANAGEMENT | Facility: CLINIC | Age: 86
End: 2021-06-25

## 2021-07-22 ENCOUNTER — MEDICAL CORRESPONDENCE (OUTPATIENT)
Dept: HEALTH INFORMATION MANAGEMENT | Facility: CLINIC | Age: 86
End: 2021-07-22

## 2021-07-26 ENCOUNTER — TELEPHONE (OUTPATIENT)
Dept: GERIATRICS | Facility: CLINIC | Age: 86
End: 2021-07-26

## 2021-07-27 NOTE — TELEPHONE ENCOUNTER
Received word from [facility] patient moved from [facility] and will be followed by their in-house provider, Selene.

## 2021-10-06 ENCOUNTER — LAB REQUISITION (OUTPATIENT)
Dept: LAB | Facility: CLINIC | Age: 86
End: 2021-10-06
Payer: COMMERCIAL

## 2021-10-06 DIAGNOSIS — E03.9 HYPOTHYROIDISM, UNSPECIFIED: ICD-10-CM

## 2021-10-06 DIAGNOSIS — I10 ESSENTIAL (PRIMARY) HYPERTENSION: ICD-10-CM

## 2021-10-07 LAB
ANION GAP SERPL CALCULATED.3IONS-SCNC: 12 MMOL/L (ref 5–18)
BUN SERPL-MCNC: 29 MG/DL (ref 8–28)
CALCIUM SERPL-MCNC: 9.4 MG/DL (ref 8.5–10.5)
CHLORIDE BLD-SCNC: 103 MMOL/L (ref 98–107)
CO2 SERPL-SCNC: 26 MMOL/L (ref 22–31)
CREAT SERPL-MCNC: 0.93 MG/DL (ref 0.6–1.1)
ERYTHROCYTE [DISTWIDTH] IN BLOOD BY AUTOMATED COUNT: 15.6 % (ref 10–15)
GFR SERPL CREATININE-BSD FRML MDRD: 54 ML/MIN/1.73M2
GLUCOSE BLD-MCNC: 103 MG/DL (ref 70–125)
HCT VFR BLD AUTO: 39.6 % (ref 35–47)
HGB BLD-MCNC: 12.7 G/DL (ref 11.7–15.7)
MCH RBC QN AUTO: 28.5 PG (ref 26.5–33)
MCHC RBC AUTO-ENTMCNC: 32.1 G/DL (ref 31.5–36.5)
MCV RBC AUTO: 89 FL (ref 78–100)
PLATELET # BLD AUTO: 198 10E3/UL (ref 150–450)
POTASSIUM BLD-SCNC: 3.7 MMOL/L (ref 3.5–5)
RBC # BLD AUTO: 4.46 10E6/UL (ref 3.8–5.2)
SODIUM SERPL-SCNC: 141 MMOL/L (ref 136–145)
TSH SERPL DL<=0.005 MIU/L-ACNC: 0.43 UIU/ML (ref 0.3–5)
WBC # BLD AUTO: 12.5 10E3/UL (ref 4–11)

## 2021-10-07 PROCEDURE — 84443 ASSAY THYROID STIM HORMONE: CPT | Mod: ORL | Performed by: INTERNAL MEDICINE

## 2021-10-07 PROCEDURE — 80048 BASIC METABOLIC PNL TOTAL CA: CPT | Mod: ORL | Performed by: INTERNAL MEDICINE

## 2021-10-07 PROCEDURE — 85027 COMPLETE CBC AUTOMATED: CPT | Mod: ORL | Performed by: INTERNAL MEDICINE

## 2021-10-07 PROCEDURE — 36415 COLL VENOUS BLD VENIPUNCTURE: CPT | Mod: ORL | Performed by: INTERNAL MEDICINE

## 2022-04-11 ENCOUNTER — OFFICE VISIT (OUTPATIENT)
Dept: OTHER | Facility: CLINIC | Age: 87
End: 2022-04-11
Payer: COMMERCIAL

## 2022-04-11 VITALS
TEMPERATURE: 98.1 F | HEIGHT: 65 IN | WEIGHT: 148 LBS | SYSTOLIC BLOOD PRESSURE: 115 MMHG | OXYGEN SATURATION: 98 % | HEART RATE: 68 BPM | DIASTOLIC BLOOD PRESSURE: 59 MMHG | BODY MASS INDEX: 24.66 KG/M2

## 2022-04-11 DIAGNOSIS — Z00.00 ENCOUNTER FOR MEDICARE ANNUAL WELLNESS EXAM: Primary | ICD-10-CM

## 2022-04-11 ASSESSMENT — ACTIVITIES OF DAILY LIVING (ADL)
CURRENT_FUNCTION: HOUSEWORK REQUIRES ASSISTANCE
CURRENT_FUNCTION: PREPARING MEALS REQUIRES ASSISTANCE
CURRENT_FUNCTION: BATHING REQUIRES ASSISTANCE
CURRENT_FUNCTION: SHOPPING REQUIRES ASSISTANCE
CURRENT_FUNCTION: MEDICATION ADMINISTRATION REQUIRES ASSISTANCE
CURRENT_FUNCTION: TRANSPORTATION REQUIRES ASSISTANCE
CURRENT_FUNCTION: LAUNDRY REQUIRES ASSISTANCE

## 2022-04-11 ASSESSMENT — PAIN SCALES - GENERAL: PAINLEVEL: NO PAIN (0)

## 2022-04-11 NOTE — PROGRESS NOTES
Assessment & Plan     ICD-10-CM    1. Encounter for Medicare annual wellness exam  Z00.00        Follow Up/Next Steps  Return in about 1 month (around 5/11/2022) for Follow up, with PCP, Routine preventive.  Recommended that patient follow up with PCP for preventative care and care of chronic conditions.     Pt declined to have f/u visit with primary due to PCP coming to the community automatically.    Counseling and Education  Reviewed preventive health counseling, as reflected in patient instructions        Appropriate preventive services were discussed with this patient, including applicable screening as appropriate for cardiovascular disease, diabetes, osteopenia/osteoporosis, and glaucoma.  As appropriate for age/gender, discussed screening for colorectal cancer, prostate cancer, breast cancer, and cervical cancer. Checklist reviewing preventive services available has been given to the patient.    Reviewed patients plan of care and provided an AVS. The Basic Care Plan (routine screening as documented in Health Maintenance) for Ila meets the Care Plan requirement. This Care Plan has been established and reviewed with the Patient.    Visit Provider: Jie Dalton RN  Supervising Provider: CIERA Balderrama [address] [place] [address]       Betito Thorpe is a 89 year old who is being seen for an Annual Wellness Visit  accompanied by her spouse.    HPI  Do you feel safe in your environment? Yes    Have you ever done Advance Care Planning? (For example, a Health Directive, POLST, or a discussion with a medical provider or your loved ones about your wishes): No, advance care planning information given to patient to review.  Patient plans to discuss their wishes with loved ones or provider.      Fall risk  Fallen 2 or more times in the past year?: No  Any fall with injury in the past year?: No  Cognitive Screening Not appropriate due to known dementia    Do you have sleep  "apnea, excessive snoring or daytime drowsiness?: no    Reviewed and updated as needed this visit by clinical staff   Tobacco  Allergies  Meds                Social History     Tobacco Use     Smoking status: Former Smoker     Quit date: 2     Years since quittin.3     Smokeless tobacco: Never Used   Substance Use Topics     Alcohol use: Yes     Comment: 2-3 glasses of wine a week       Current providers sharing in care for this patient include:   Patient Care Team:  Paul Beavers MD as PCP - Geriatric Physician  Trinity Singh APRN CNP as Assigned PCP    The following health maintenance items are reviewed in Epic and correct as of today:  Health Maintenance Due   Topic Date Due     ANNUAL REVIEW OF  ORDERS  Never done     ZOSTER IMMUNIZATION (3 of 3) 2019     MEDICARE ANNUAL WELLNESS VISIT  2021     FALL RISK ASSESSMENT  2021       Mammogram Screening - Patient over age 75, has elected to discontinue screenings.  Pertinent mammograms are reviewed under the imaging tab.        Objective    /59 (BP Location: Right arm, Patient Position: Sitting, Cuff Size: Adult Regular)   Pulse 68   Temp 98.1  F (36.7  C) (Temporal)   Ht 1.651 m (5' 5\")   Wt 67.1 kg (148 lb)   SpO2 98%   BMI 24.63 kg/m   Estimated body mass index is 24.63 kg/m  as calculated from the following:    Height as of this encounter: 1.651 m (5' 5\").    Weight as of this encounter: 67.1 kg (148 lb).  Physical Exam  Patient appears comfortable and in no acute distress.        Identified Health Risks:  "

## 2022-04-11 NOTE — PATIENT INSTRUCTIONS
Patient Education   Personalized Prevention Plan  You are due for the preventive services outlined below.  Your care team is available to assist you in scheduling these services.  If you have already completed any of these items, please share that information with your care team to update in your medical record.  Health Maintenance Due   Topic Date Due     ANNUAL REVIEW OF HM ORDERS  Never done     Zoster (Shingles) Vaccine (3 of 3) 12/24/2019     FALL RISK ASSESSMENT  11/18/2021

## 2022-10-12 ENCOUNTER — LAB REQUISITION (OUTPATIENT)
Dept: LAB | Facility: CLINIC | Age: 87
End: 2022-10-12
Payer: COMMERCIAL

## 2022-10-12 DIAGNOSIS — I73.9 PERIPHERAL VASCULAR DISEASE, UNSPECIFIED (H): ICD-10-CM

## 2022-10-12 DIAGNOSIS — E03.9 HYPOTHYROIDISM, UNSPECIFIED: ICD-10-CM

## 2022-10-12 DIAGNOSIS — I10 ESSENTIAL (PRIMARY) HYPERTENSION: ICD-10-CM

## 2022-10-13 LAB
ALBUMIN SERPL BCG-MCNC: 4 G/DL (ref 3.5–5.2)
ALP SERPL-CCNC: 77 U/L (ref 35–104)
ALT SERPL W P-5'-P-CCNC: 8 U/L (ref 10–35)
ANION GAP SERPL CALCULATED.3IONS-SCNC: 15 MMOL/L (ref 7–15)
AST SERPL W P-5'-P-CCNC: 23 U/L (ref 10–35)
BILIRUB SERPL-MCNC: 0.7 MG/DL
BUN SERPL-MCNC: 14.8 MG/DL (ref 8–23)
CALCIUM SERPL-MCNC: 9.6 MG/DL (ref 8.2–9.6)
CHLORIDE SERPL-SCNC: 104 MMOL/L (ref 98–107)
CREAT SERPL-MCNC: 0.75 MG/DL (ref 0.51–0.95)
DEPRECATED HCO3 PLAS-SCNC: 20 MMOL/L (ref 22–29)
ERYTHROCYTE [DISTWIDTH] IN BLOOD BY AUTOMATED COUNT: 15 % (ref 10–15)
GFR SERPL CREATININE-BSD FRML MDRD: 74 ML/MIN/1.73M2
GLUCOSE SERPL-MCNC: 96 MG/DL (ref 70–99)
HCT VFR BLD AUTO: 43.6 % (ref 35–47)
HGB BLD-MCNC: 14 G/DL (ref 11.7–15.7)
MCH RBC QN AUTO: 28.6 PG (ref 26.5–33)
MCHC RBC AUTO-ENTMCNC: 32.1 G/DL (ref 31.5–36.5)
MCV RBC AUTO: 89 FL (ref 78–100)
PLATELET # BLD AUTO: 207 10E3/UL (ref 150–450)
POTASSIUM SERPL-SCNC: 4.2 MMOL/L (ref 3.4–5.3)
PROT SERPL-MCNC: 7.5 G/DL (ref 6.4–8.3)
RBC # BLD AUTO: 4.89 10E6/UL (ref 3.8–5.2)
SODIUM SERPL-SCNC: 139 MMOL/L (ref 136–145)
TSH SERPL DL<=0.005 MIU/L-ACNC: 0.88 UIU/ML (ref 0.3–4.2)
WBC # BLD AUTO: 14.2 10E3/UL (ref 4–11)

## 2022-10-13 PROCEDURE — P9604 ONE-WAY ALLOW PRORATED TRIP: HCPCS | Mod: ORL | Performed by: FAMILY MEDICINE

## 2022-10-13 PROCEDURE — 85027 COMPLETE CBC AUTOMATED: CPT | Mod: ORL | Performed by: FAMILY MEDICINE

## 2022-10-13 PROCEDURE — 80053 COMPREHEN METABOLIC PANEL: CPT | Mod: ORL | Performed by: FAMILY MEDICINE

## 2022-10-13 PROCEDURE — 84443 ASSAY THYROID STIM HORMONE: CPT | Mod: ORL | Performed by: FAMILY MEDICINE

## 2022-10-13 PROCEDURE — 36415 COLL VENOUS BLD VENIPUNCTURE: CPT | Mod: ORL | Performed by: FAMILY MEDICINE

## 2023-01-01 ENCOUNTER — DOCUMENTATION ONLY (OUTPATIENT)
Dept: OTHER | Facility: CLINIC | Age: 88
End: 2023-01-01

## 2023-01-01 ENCOUNTER — LAB REQUISITION (OUTPATIENT)
Dept: LAB | Facility: CLINIC | Age: 88
End: 2023-01-01
Payer: COMMERCIAL

## 2023-01-01 ENCOUNTER — APPOINTMENT (OUTPATIENT)
Dept: GENERAL RADIOLOGY | Facility: CLINIC | Age: 88
DRG: 871 | End: 2023-01-01
Attending: EMERGENCY MEDICINE
Payer: COMMERCIAL

## 2023-01-01 ENCOUNTER — DOCUMENTATION ONLY (OUTPATIENT)
Dept: CARE COORDINATION | Facility: CLINIC | Age: 88
End: 2023-01-01
Payer: COMMERCIAL

## 2023-01-01 ENCOUNTER — APPOINTMENT (OUTPATIENT)
Dept: CT IMAGING | Facility: CLINIC | Age: 88
DRG: 871 | End: 2023-01-01
Attending: EMERGENCY MEDICINE
Payer: COMMERCIAL

## 2023-01-01 ENCOUNTER — HOSPITAL ENCOUNTER (INPATIENT)
Facility: CLINIC | Age: 88
LOS: 1 days | End: 2023-11-25
Attending: INTERNAL MEDICINE | Admitting: INTERNAL MEDICINE
Payer: COMMERCIAL

## 2023-01-01 ENCOUNTER — APPOINTMENT (OUTPATIENT)
Dept: CT IMAGING | Facility: CLINIC | Age: 88
DRG: 871 | End: 2023-01-01
Attending: SURGERY
Payer: COMMERCIAL

## 2023-01-01 ENCOUNTER — HOSPITAL ENCOUNTER (INPATIENT)
Facility: CLINIC | Age: 88
LOS: 2 days | Discharge: HOSPICE | DRG: 871 | End: 2023-11-24
Attending: EMERGENCY MEDICINE | Admitting: INTERNAL MEDICINE
Payer: COMMERCIAL

## 2023-01-01 VITALS
WEIGHT: 118 LBS | HEART RATE: 112 BPM | SYSTOLIC BLOOD PRESSURE: 98 MMHG | OXYGEN SATURATION: 79 % | BODY MASS INDEX: 19.66 KG/M2 | TEMPERATURE: 98.2 F | DIASTOLIC BLOOD PRESSURE: 55 MMHG | HEIGHT: 65 IN | RESPIRATION RATE: 16 BRPM

## 2023-01-01 VITALS — RESPIRATION RATE: 32 BRPM

## 2023-01-01 DIAGNOSIS — E86.0 DEHYDRATION: ICD-10-CM

## 2023-01-01 DIAGNOSIS — A41.9 SEVERE SEPSIS (H): ICD-10-CM

## 2023-01-01 DIAGNOSIS — N30.90 BLADDER INFECTION: ICD-10-CM

## 2023-01-01 DIAGNOSIS — N17.9 ACUTE RENAL FAILURE, UNSPECIFIED ACUTE RENAL FAILURE TYPE (H): ICD-10-CM

## 2023-01-01 DIAGNOSIS — R41.0 DELIRIUM: ICD-10-CM

## 2023-01-01 DIAGNOSIS — I24.89 DEMAND ISCHEMIA (H): ICD-10-CM

## 2023-01-01 DIAGNOSIS — D72.829 ELEVATED WHITE BLOOD CELL COUNT, UNSPECIFIED: ICD-10-CM

## 2023-01-01 DIAGNOSIS — R65.20 SEVERE SEPSIS (H): ICD-10-CM

## 2023-01-01 DIAGNOSIS — D72.829 LEUKOCYTOSIS, UNSPECIFIED TYPE: ICD-10-CM

## 2023-01-01 DIAGNOSIS — J18.9 PNEUMONIA OF BOTH LOWER LOBES DUE TO INFECTIOUS ORGANISM: ICD-10-CM

## 2023-01-01 DIAGNOSIS — I48.0 PAROXYSMAL ATRIAL FIBRILLATION (H): ICD-10-CM

## 2023-01-01 LAB
ACANTHOCYTES BLD QL SMEAR: SLIGHT
ACINETOBACTER SPECIES: NOT DETECTED
ALBUMIN UR-MCNC: 70 MG/DL
ANION GAP SERPL CALCULATED.3IONS-SCNC: 16 MMOL/L (ref 7–15)
APPEARANCE UR: ABNORMAL
ATRIAL RATE - MUSE: NORMAL BPM
AUER BODIES BLD QL SMEAR: ABNORMAL
BACTERIA #/AREA URNS HPF: ABNORMAL /HPF
BACTERIA BLD CULT: ABNORMAL
BACTERIA UR CULT: ABNORMAL
BASO STIPL BLD QL SMEAR: ABNORMAL
BASOPHILS # BLD AUTO: 0 10E3/UL (ref 0–0.2)
BASOPHILS # BLD MANUAL: 0 10E3/UL (ref 0–0.2)
BASOPHILS NFR BLD AUTO: 0 %
BASOPHILS NFR BLD MANUAL: 0 %
BILIRUB UR QL STRIP: NEGATIVE
BITE CELLS BLD QL SMEAR: ABNORMAL
BLISTER CELLS BLD QL SMEAR: ABNORMAL
BUN SERPL-MCNC: 26.5 MG/DL (ref 8–23)
BURR CELLS BLD QL SMEAR: ABNORMAL
CALCIUM SERPL-MCNC: 8.9 MG/DL (ref 8.2–9.6)
CHLORIDE SERPL-SCNC: 103 MMOL/L (ref 98–107)
CITROBACTER SPECIES: NOT DETECTED
COLOR UR AUTO: ABNORMAL
CREAT SERPL-MCNC: 1.42 MG/DL (ref 0.51–0.95)
CTX-M: NOT DETECTED
DACRYOCYTES BLD QL SMEAR: ABNORMAL
DEPRECATED HCO3 PLAS-SCNC: 20 MMOL/L (ref 22–29)
DIASTOLIC BLOOD PRESSURE - MUSE: NORMAL MMHG
EGFRCR SERPLBLD CKD-EPI 2021: 34 ML/MIN/1.73M2
ELLIPTOCYTES BLD QL SMEAR: SLIGHT
ENTEROBACTER SPECIES: NOT DETECTED
EOSINOPHIL # BLD AUTO: 0 10E3/UL (ref 0–0.7)
EOSINOPHIL # BLD MANUAL: 0 10E3/UL (ref 0–0.7)
EOSINOPHIL NFR BLD AUTO: 0 %
EOSINOPHIL NFR BLD MANUAL: 0 %
ERYTHROCYTE [DISTWIDTH] IN BLOOD BY AUTOMATED COUNT: 14.5 % (ref 10–15)
ERYTHROCYTE [DISTWIDTH] IN BLOOD BY AUTOMATED COUNT: 14.7 % (ref 10–15)
ERYTHROCYTE [DISTWIDTH] IN BLOOD BY AUTOMATED COUNT: 15.4 % (ref 10–15)
ESCHERICHIA COLI: NOT DETECTED
FLUAV RNA SPEC QL NAA+PROBE: NEGATIVE
FLUBV RNA RESP QL NAA+PROBE: NEGATIVE
FRAGMENTS BLD QL SMEAR: ABNORMAL
GLUCOSE SERPL-MCNC: 89 MG/DL (ref 70–99)
GLUCOSE UR STRIP-MCNC: NEGATIVE MG/DL
HCO3 BLDV-SCNC: 20 MMOL/L (ref 21–28)
HCO3 BLDV-SCNC: 22 MMOL/L (ref 21–28)
HCT VFR BLD AUTO: 33.9 % (ref 35–47)
HCT VFR BLD AUTO: 35.6 % (ref 35–47)
HCT VFR BLD AUTO: 40.4 % (ref 35–47)
HGB BLD-MCNC: 10.8 G/DL (ref 11.7–15.7)
HGB BLD-MCNC: 11.7 G/DL (ref 11.7–15.7)
HGB BLD-MCNC: 12.5 G/DL (ref 11.7–15.7)
HGB C CRYSTALS: ABNORMAL
HGB UR QL STRIP: ABNORMAL
HOLD SPECIMEN: NORMAL
HOWELL-JOLLY BOD BLD QL SMEAR: ABNORMAL
IMM GRANULOCYTES # BLD: 0 10E3/UL
IMM GRANULOCYTES NFR BLD: 0 %
IMP: NOT DETECTED
INTERPRETATION ECG - MUSE: NORMAL
KETONES UR STRIP-MCNC: ABNORMAL MG/DL
KLEBSIELLA OXYTOCA: DETECTED
KLEBSIELLA PNEUMONIAE: NOT DETECTED
KPC: NOT DETECTED
LACTATE BLD-SCNC: 3.9 MMOL/L
LACTATE BLD-SCNC: 4.6 MMOL/L
LEUKOCYTE ESTERASE UR QL STRIP: ABNORMAL
LYMPHOCYTES # BLD AUTO: 1.2 10E3/UL (ref 0.8–5.3)
LYMPHOCYTES # BLD MANUAL: 0 10E3/UL (ref 0.8–5.3)
LYMPHOCYTES NFR BLD AUTO: 9 %
LYMPHOCYTES NFR BLD MANUAL: 0 %
MCH RBC QN AUTO: 27.4 PG (ref 26.5–33)
MCH RBC QN AUTO: 28.6 PG (ref 26.5–33)
MCH RBC QN AUTO: 29.3 PG (ref 26.5–33)
MCHC RBC AUTO-ENTMCNC: 30.9 G/DL (ref 31.5–36.5)
MCHC RBC AUTO-ENTMCNC: 31.9 G/DL (ref 31.5–36.5)
MCHC RBC AUTO-ENTMCNC: 32.9 G/DL (ref 31.5–36.5)
MCV RBC AUTO: 88 FL (ref 78–100)
MCV RBC AUTO: 89 FL (ref 78–100)
MCV RBC AUTO: 90 FL (ref 78–100)
METAMYELOCYTES # BLD MANUAL: 4.7 10E3/UL
METAMYELOCYTES NFR BLD MANUAL: 6 %
MONOCYTES # BLD AUTO: 0.6 10E3/UL (ref 0–1.3)
MONOCYTES # BLD MANUAL: 2.3 10E3/UL (ref 0–1.3)
MONOCYTES NFR BLD AUTO: 5 %
MONOCYTES NFR BLD MANUAL: 3 %
MUCOUS THREADS #/AREA URNS LPF: PRESENT /LPF
MYELOCYTES # BLD MANUAL: 7 10E3/UL
MYELOCYTES NFR BLD MANUAL: 9 %
NDM: NOT DETECTED
NEUTROPHILS # BLD AUTO: 10.5 10E3/UL (ref 1.6–8.3)
NEUTROPHILS # BLD MANUAL: 63.6 10E3/UL (ref 1.6–8.3)
NEUTROPHILS NFR BLD AUTO: 86 %
NEUTROPHILS NFR BLD MANUAL: 82 %
NEUTS HYPERSEG BLD QL SMEAR: ABNORMAL
NITRATE UR QL: NEGATIVE
NRBC # BLD AUTO: 0 10E3/UL
NRBC BLD AUTO-RTO: 0 /100
OXA (DETECTED/NOT DETECTED): NOT DETECTED
P AXIS - MUSE: NORMAL DEGREES
PCO2 BLDV: 39 MM HG (ref 40–50)
PCO2 BLDV: 40 MM HG (ref 40–50)
PH BLDV: 7.32 [PH] (ref 7.32–7.43)
PH BLDV: 7.35 [PH] (ref 7.32–7.43)
PH UR STRIP: 5.5 [PH] (ref 5–7)
PLAT MORPH BLD: ABNORMAL
PLAT MORPH BLD: ABNORMAL
PLATELET # BLD AUTO: 215 10E3/UL (ref 150–450)
PLATELET # BLD AUTO: 51 10E3/UL (ref 150–450)
PLATELET # BLD AUTO: 71 10E3/UL (ref 150–450)
PO2 BLDV: 18 MM HG (ref 25–47)
PO2 BLDV: 20 MM HG (ref 25–47)
POLYCHROMASIA BLD QL SMEAR: ABNORMAL
POTASSIUM SERPL-SCNC: 3.7 MMOL/L (ref 3.4–5.3)
PR INTERVAL - MUSE: NORMAL MS
PROTEUS SPECIES: NOT DETECTED
PSEUDOMONAS AERUGINOSA: NOT DETECTED
QRS DURATION - MUSE: 80 MS
QT - MUSE: 360 MS
QTC - MUSE: 442 MS
R AXIS - MUSE: 6 DEGREES
RBC # BLD AUTO: 3.77 10E6/UL (ref 3.8–5.2)
RBC # BLD AUTO: 4 10E6/UL (ref 3.8–5.2)
RBC # BLD AUTO: 4.57 10E6/UL (ref 3.8–5.2)
RBC AGGLUT BLD QL: ABNORMAL
RBC MORPH BLD: ABNORMAL
RBC MORPH BLD: ABNORMAL
RBC URINE: 31 /HPF
ROULEAUX BLD QL SMEAR: ABNORMAL
RSV RNA SPEC NAA+PROBE: NEGATIVE
SAO2 % BLDV: 24 % (ref 94–100)
SAO2 % BLDV: 29 % (ref 94–100)
SARS-COV-2 RNA RESP QL NAA+PROBE: NEGATIVE
SARS-COV-2 RNA RESP QL NAA+PROBE: NEGATIVE
SICKLE CELLS BLD QL SMEAR: ABNORMAL
SMUDGE CELLS BLD QL SMEAR: ABNORMAL
SMUDGE CELLS BLD QL SMEAR: PRESENT
SODIUM SERPL-SCNC: 139 MMOL/L (ref 135–145)
SP GR UR STRIP: 1.02 (ref 1–1.03)
SPHEROCYTES BLD QL SMEAR: ABNORMAL
SQUAMOUS EPITHELIAL: 3 /HPF
STOMATOCYTES BLD QL SMEAR: ABNORMAL
SYSTOLIC BLOOD PRESSURE - MUSE: NORMAL MMHG
T AXIS - MUSE: 31 DEGREES
TARGETS BLD QL SMEAR: ABNORMAL
TOXIC GRANULES BLD QL SMEAR: ABNORMAL
TROPONIN T SERPL HS-MCNC: 45 NG/L
UROBILINOGEN UR STRIP-MCNC: 4 MG/DL
VARIANT LYMPHS BLD QL SMEAR: ABNORMAL
VENTRICULAR RATE- MUSE: 91 BPM
VIM: NOT DETECTED
WBC # BLD AUTO: 12.4 10E3/UL (ref 4–11)
WBC # BLD AUTO: 77.5 10E3/UL (ref 4–11)
WBC # BLD AUTO: 82.8 10E3/UL (ref 4–11)
WBC CLUMPS #/AREA URNS HPF: PRESENT /HPF
WBC URINE: >182 /HPF

## 2023-01-01 PROCEDURE — 250N000011 HC RX IP 250 OP 636: Performed by: SURGERY

## 2023-01-01 PROCEDURE — 99207 PR APP CREDIT; MD BILLING SHARED VISIT: CPT

## 2023-01-01 PROCEDURE — 999N000128 HC STATISTIC PERIPHERAL IV START W/O US GUIDANCE

## 2023-01-01 PROCEDURE — 99223 1ST HOSP IP/OBS HIGH 75: CPT | Mod: FS | Performed by: INTERNAL MEDICINE

## 2023-01-01 PROCEDURE — 85027 COMPLETE CBC AUTOMATED: CPT | Performed by: PHYSICIAN ASSISTANT

## 2023-01-01 PROCEDURE — 250N000011 HC RX IP 250 OP 636: Mod: JZ | Performed by: PHYSICIAN ASSISTANT

## 2023-01-01 PROCEDURE — 250N000011 HC RX IP 250 OP 636: Mod: JZ | Performed by: SURGERY

## 2023-01-01 PROCEDURE — 85007 BL SMEAR W/DIFF WBC COUNT: CPT | Performed by: EMERGENCY MEDICINE

## 2023-01-01 PROCEDURE — 87149 DNA/RNA DIRECT PROBE: CPT | Performed by: EMERGENCY MEDICINE

## 2023-01-01 PROCEDURE — 87635 SARS-COV-2 COVID-19 AMP PRB: CPT | Performed by: PHYSICIAN ASSISTANT

## 2023-01-01 PROCEDURE — P9604 ONE-WAY ALLOW PRORATED TRIP: HCPCS | Mod: ORL | Performed by: FAMILY MEDICINE

## 2023-01-01 PROCEDURE — 99291 CRITICAL CARE FIRST HOUR: CPT | Mod: 25

## 2023-01-01 PROCEDURE — 85027 COMPLETE CBC AUTOMATED: CPT | Performed by: EMERGENCY MEDICINE

## 2023-01-01 PROCEDURE — 110N000005 HC R&B HOSPICE, ACCENT

## 2023-01-01 PROCEDURE — 99239 HOSP IP/OBS DSCHRG MGMT >30: CPT | Performed by: INTERNAL MEDICINE

## 2023-01-01 PROCEDURE — 71046 X-RAY EXAM CHEST 2 VIEWS: CPT

## 2023-01-01 PROCEDURE — 250N000011 HC RX IP 250 OP 636: Performed by: INTERNAL MEDICINE

## 2023-01-01 PROCEDURE — 70450 CT HEAD/BRAIN W/O DYE: CPT

## 2023-01-01 PROCEDURE — 120N000001 HC R&B MED SURG/OB

## 2023-01-01 PROCEDURE — 999N000040 HC STATISTIC CONSULT NO CHARGE VASC ACCESS

## 2023-01-01 PROCEDURE — 82310 ASSAY OF CALCIUM: CPT | Performed by: EMERGENCY MEDICINE

## 2023-01-01 PROCEDURE — 99207 PR APP CREDIT; MD BILLING SHARED VISIT: CPT | Performed by: PHYSICIAN ASSISTANT

## 2023-01-01 PROCEDURE — 74176 CT ABD & PELVIS W/O CONTRAST: CPT

## 2023-01-01 PROCEDURE — 96365 THER/PROPH/DIAG IV INF INIT: CPT

## 2023-01-01 PROCEDURE — 99222 1ST HOSP IP/OBS MODERATE 55: CPT | Performed by: STUDENT IN AN ORGANIZED HEALTH CARE EDUCATION/TRAINING PROGRAM

## 2023-01-01 PROCEDURE — 258N000003 HC RX IP 258 OP 636: Performed by: EMERGENCY MEDICINE

## 2023-01-01 PROCEDURE — 82803 BLOOD GASES ANY COMBINATION: CPT

## 2023-01-01 PROCEDURE — 87637 SARSCOV2&INF A&B&RSV AMP PRB: CPT | Performed by: EMERGENCY MEDICINE

## 2023-01-01 PROCEDURE — 36415 COLL VENOUS BLD VENIPUNCTURE: CPT | Mod: ORL | Performed by: FAMILY MEDICINE

## 2023-01-01 PROCEDURE — 272N000452 HC KIT SHRLOCK 5FR POWER PICC TRIPLE LUMEN

## 2023-01-01 PROCEDURE — 3E043XZ INTRODUCTION OF VASOPRESSOR INTO CENTRAL VEIN, PERCUTANEOUS APPROACH: ICD-10-PCS | Performed by: INTERNAL MEDICINE

## 2023-01-01 PROCEDURE — 96361 HYDRATE IV INFUSION ADD-ON: CPT

## 2023-01-01 PROCEDURE — 99238 HOSP IP/OBS DSCHRG MGMT 30/<: CPT | Mod: GV

## 2023-01-01 PROCEDURE — 250N000011 HC RX IP 250 OP 636: Mod: JZ | Performed by: EMERGENCY MEDICINE

## 2023-01-01 PROCEDURE — 99207 PR APP CREDIT; MD BILLING SHARED VISIT: CPT | Performed by: INTERNAL MEDICINE

## 2023-01-01 PROCEDURE — 85025 COMPLETE CBC W/AUTO DIFF WBC: CPT | Mod: ORL | Performed by: FAMILY MEDICINE

## 2023-01-01 PROCEDURE — 99232 SBSQ HOSP IP/OBS MODERATE 35: CPT

## 2023-01-01 PROCEDURE — 81001 URINALYSIS AUTO W/SCOPE: CPT | Performed by: EMERGENCY MEDICINE

## 2023-01-01 PROCEDURE — 36415 COLL VENOUS BLD VENIPUNCTURE: CPT | Performed by: EMERGENCY MEDICINE

## 2023-01-01 PROCEDURE — 87086 URINE CULTURE/COLONY COUNT: CPT | Performed by: EMERGENCY MEDICINE

## 2023-01-01 PROCEDURE — 93005 ELECTROCARDIOGRAM TRACING: CPT

## 2023-01-01 PROCEDURE — 84484 ASSAY OF TROPONIN QUANT: CPT | Performed by: EMERGENCY MEDICINE

## 2023-01-01 PROCEDURE — 200N000001 HC R&B ICU

## 2023-01-01 PROCEDURE — 51798 US URINE CAPACITY MEASURE: CPT

## 2023-01-01 PROCEDURE — 87077 CULTURE AEROBIC IDENTIFY: CPT | Performed by: EMERGENCY MEDICINE

## 2023-01-01 RX ORDER — AMOXICILLIN 250 MG
1 CAPSULE ORAL 2 TIMES DAILY PRN
Status: CANCELLED | OUTPATIENT
Start: 2023-01-01

## 2023-01-01 RX ORDER — LORAZEPAM 1 MG/1
1 TABLET ORAL
Status: DISCONTINUED | OUTPATIENT
Start: 2023-01-01 | End: 2023-01-01 | Stop reason: HOSPADM

## 2023-01-01 RX ORDER — MORPHINE SULFATE 2 MG/ML
1 INJECTION, SOLUTION INTRAMUSCULAR; INTRAVENOUS
Status: DISCONTINUED | OUTPATIENT
Start: 2023-01-01 | End: 2023-01-01 | Stop reason: HOSPADM

## 2023-01-01 RX ORDER — LIDOCAINE 40 MG/G
CREAM TOPICAL
Status: CANCELLED | OUTPATIENT
Start: 2023-01-01

## 2023-01-01 RX ORDER — ROPIVACAINE IN 0.9% SOD CHL/PF 0.1 %
.03-.125 PLASTIC BAG, INJECTION (ML) EPIDURAL CONTINUOUS
Status: DISCONTINUED | OUTPATIENT
Start: 2023-01-01 | End: 2023-01-01

## 2023-01-01 RX ORDER — LORAZEPAM 2 MG/ML
1 INJECTION INTRAMUSCULAR
Status: DISCONTINUED | OUTPATIENT
Start: 2023-01-01 | End: 2023-01-01 | Stop reason: HOSPADM

## 2023-01-01 RX ORDER — LIDOCAINE 40 MG/G
CREAM TOPICAL
Status: CANCELLED | OUTPATIENT
Start: 2023-01-01 | End: 2023-01-01

## 2023-01-01 RX ORDER — AMOXICILLIN 250 MG
2 CAPSULE ORAL 2 TIMES DAILY PRN
Status: DISCONTINUED | OUTPATIENT
Start: 2023-01-01 | End: 2023-01-01 | Stop reason: HOSPADM

## 2023-01-01 RX ORDER — GUAIFENESIN 200 MG/10ML
200 LIQUID ORAL EVERY 4 HOURS PRN
Status: CANCELLED | OUTPATIENT
Start: 2023-01-01

## 2023-01-01 RX ORDER — CALCIUM CARBONATE 500 MG/1
1000 TABLET, CHEWABLE ORAL 4 TIMES DAILY PRN
Status: CANCELLED | OUTPATIENT
Start: 2023-01-01

## 2023-01-01 RX ORDER — PIPERACILLIN SODIUM, TAZOBACTAM SODIUM 3; .375 G/15ML; G/15ML
3.38 INJECTION, POWDER, LYOPHILIZED, FOR SOLUTION INTRAVENOUS ONCE
Status: COMPLETED | OUTPATIENT
Start: 2023-01-01 | End: 2023-01-01

## 2023-01-01 RX ORDER — AMOXICILLIN 250 MG
1 CAPSULE ORAL 2 TIMES DAILY PRN
Status: DISCONTINUED | OUTPATIENT
Start: 2023-01-01 | End: 2023-01-01 | Stop reason: HOSPADM

## 2023-01-01 RX ORDER — NALOXONE HYDROCHLORIDE 0.4 MG/ML
0.2 INJECTION, SOLUTION INTRAMUSCULAR; INTRAVENOUS; SUBCUTANEOUS
Status: DISCONTINUED | OUTPATIENT
Start: 2023-01-01 | End: 2023-01-01 | Stop reason: HOSPADM

## 2023-01-01 RX ORDER — GLYCOPYRROLATE 0.2 MG/ML
0.2 INJECTION, SOLUTION INTRAMUSCULAR; INTRAVENOUS EVERY 4 HOURS PRN
Status: DISCONTINUED | OUTPATIENT
Start: 2023-01-01 | End: 2023-01-01 | Stop reason: HOSPADM

## 2023-01-01 RX ORDER — LIDOCAINE 40 MG/G
CREAM TOPICAL
Status: DISCONTINUED | OUTPATIENT
Start: 2023-01-01 | End: 2023-01-01 | Stop reason: HOSPADM

## 2023-01-01 RX ORDER — POLYETHYLENE GLYCOL 3350 17 G/17G
17 POWDER, FOR SOLUTION ORAL 2 TIMES DAILY PRN
Status: DISCONTINUED | OUTPATIENT
Start: 2023-01-01 | End: 2023-01-01

## 2023-01-01 RX ORDER — NALOXONE HYDROCHLORIDE 0.4 MG/ML
0.2 INJECTION, SOLUTION INTRAMUSCULAR; INTRAVENOUS; SUBCUTANEOUS
Status: CANCELLED | OUTPATIENT
Start: 2023-01-01

## 2023-01-01 RX ORDER — MORPHINE SULFATE 2 MG/ML
1 INJECTION, SOLUTION INTRAMUSCULAR; INTRAVENOUS
Status: CANCELLED | OUTPATIENT
Start: 2023-01-01

## 2023-01-01 RX ORDER — LEVOTHYROXINE SODIUM 100 UG/1
100 TABLET ORAL DAILY
Status: DISCONTINUED | OUTPATIENT
Start: 2023-01-01 | End: 2023-01-01

## 2023-01-01 RX ORDER — HALOPERIDOL 5 MG/ML
1 INJECTION INTRAMUSCULAR ONCE
Status: COMPLETED | OUTPATIENT
Start: 2023-01-01 | End: 2023-01-01

## 2023-01-01 RX ORDER — DILTIAZEM HCL 60 MG
60 TABLET ORAL EVERY 6 HOURS SCHEDULED
Status: DISCONTINUED | OUTPATIENT
Start: 2023-01-01 | End: 2023-01-01

## 2023-01-01 RX ORDER — PIPERACILLIN SODIUM, TAZOBACTAM SODIUM 3; .375 G/15ML; G/15ML
3.38 INJECTION, POWDER, LYOPHILIZED, FOR SOLUTION INTRAVENOUS EVERY 6 HOURS
Status: DISCONTINUED | OUTPATIENT
Start: 2023-01-01 | End: 2023-01-01

## 2023-01-01 RX ORDER — MORPHINE SULFATE 2 MG/ML
2 INJECTION, SOLUTION INTRAMUSCULAR; INTRAVENOUS
Status: DISCONTINUED | OUTPATIENT
Start: 2023-01-01 | End: 2023-01-01

## 2023-01-01 RX ORDER — CARBOXYMETHYLCELLULOSE SODIUM 5 MG/ML
1-2 SOLUTION/ DROPS OPHTHALMIC
Status: DISCONTINUED | OUTPATIENT
Start: 2023-01-01 | End: 2023-01-01 | Stop reason: HOSPADM

## 2023-01-01 RX ORDER — NALOXONE HYDROCHLORIDE 0.4 MG/ML
0.1 INJECTION, SOLUTION INTRAMUSCULAR; INTRAVENOUS; SUBCUTANEOUS
Status: DISCONTINUED | OUTPATIENT
Start: 2023-01-01 | End: 2023-01-01 | Stop reason: HOSPADM

## 2023-01-01 RX ORDER — ONDANSETRON 4 MG/1
4 TABLET, ORALLY DISINTEGRATING ORAL EVERY 6 HOURS PRN
Status: DISCONTINUED | OUTPATIENT
Start: 2023-01-01 | End: 2023-01-01 | Stop reason: HOSPADM

## 2023-01-01 RX ORDER — FENTANYL CITRATE 50 UG/ML
12.5 INJECTION, SOLUTION INTRAMUSCULAR; INTRAVENOUS ONCE
Status: COMPLETED | OUTPATIENT
Start: 2023-01-01 | End: 2023-01-01

## 2023-01-01 RX ORDER — ONDANSETRON 2 MG/ML
4 INJECTION INTRAMUSCULAR; INTRAVENOUS EVERY 6 HOURS PRN
Status: DISCONTINUED | OUTPATIENT
Start: 2023-01-01 | End: 2023-01-01 | Stop reason: HOSPADM

## 2023-01-01 RX ORDER — ACETAMINOPHEN 325 MG/1
650 TABLET ORAL EVERY 8 HOURS PRN
Status: DISCONTINUED | OUTPATIENT
Start: 2023-01-01 | End: 2023-01-01 | Stop reason: HOSPADM

## 2023-01-01 RX ORDER — ACETAMINOPHEN 325 MG/1
650 TABLET ORAL EVERY 8 HOURS PRN
Status: CANCELLED | OUTPATIENT
Start: 2023-01-01

## 2023-01-01 RX ORDER — CALCIUM CARBONATE 500 MG/1
1000 TABLET, CHEWABLE ORAL 4 TIMES DAILY PRN
Status: DISCONTINUED | OUTPATIENT
Start: 2023-01-01 | End: 2023-01-01 | Stop reason: HOSPADM

## 2023-01-01 RX ORDER — LORAZEPAM 1 MG/1
1 TABLET ORAL
Status: CANCELLED | OUTPATIENT
Start: 2023-01-01

## 2023-01-01 RX ORDER — POLYETHYLENE GLYCOL 3350 17 G/17G
17 POWDER, FOR SOLUTION ORAL DAILY PRN
Status: CANCELLED | OUTPATIENT
Start: 2023-01-01

## 2023-01-01 RX ORDER — LORAZEPAM 2 MG/ML
1 INJECTION INTRAMUSCULAR
Status: CANCELLED | OUTPATIENT
Start: 2023-01-01

## 2023-01-01 RX ORDER — GLYCOPYRROLATE 0.2 MG/ML
0.2 INJECTION, SOLUTION INTRAMUSCULAR; INTRAVENOUS EVERY 4 HOURS PRN
Status: CANCELLED | OUTPATIENT
Start: 2023-01-01

## 2023-01-01 RX ORDER — ONDANSETRON 2 MG/ML
4 INJECTION INTRAMUSCULAR; INTRAVENOUS EVERY 6 HOURS PRN
Status: CANCELLED | OUTPATIENT
Start: 2023-01-01

## 2023-01-01 RX ORDER — ONDANSETRON 4 MG/1
4 TABLET, ORALLY DISINTEGRATING ORAL EVERY 6 HOURS PRN
Status: CANCELLED | OUTPATIENT
Start: 2023-01-01

## 2023-01-01 RX ORDER — NALOXONE HYDROCHLORIDE 0.4 MG/ML
0.1 INJECTION, SOLUTION INTRAMUSCULAR; INTRAVENOUS; SUBCUTANEOUS
Status: CANCELLED | OUTPATIENT
Start: 2023-01-01

## 2023-01-01 RX ORDER — POLYETHYLENE GLYCOL 3350 17 G/17G
17 POWDER, FOR SOLUTION ORAL DAILY PRN
Status: DISCONTINUED | OUTPATIENT
Start: 2023-01-01 | End: 2023-01-01 | Stop reason: HOSPADM

## 2023-01-01 RX ORDER — MORPHINE SULFATE 4 MG/ML
4 INJECTION, SOLUTION INTRAMUSCULAR; INTRAVENOUS
Status: DISCONTINUED | OUTPATIENT
Start: 2023-01-01 | End: 2023-01-01

## 2023-01-01 RX ORDER — CARBOXYMETHYLCELLULOSE SODIUM 5 MG/ML
1-2 SOLUTION/ DROPS OPHTHALMIC
Status: CANCELLED | OUTPATIENT
Start: 2023-01-01

## 2023-01-01 RX ORDER — AMOXICILLIN 250 MG
2 CAPSULE ORAL 2 TIMES DAILY PRN
Status: CANCELLED | OUTPATIENT
Start: 2023-01-01

## 2023-01-01 RX ADMIN — AMIODARONE HYDROCHLORIDE 150 MG: 1.5 INJECTION, SOLUTION INTRAVENOUS at 21:52

## 2023-01-01 RX ADMIN — MORPHINE SULFATE 1 MG: 2 INJECTION, SOLUTION INTRAMUSCULAR; INTRAVENOUS at 18:25

## 2023-01-01 RX ADMIN — MORPHINE SULFATE 4 MG: 4 INJECTION, SOLUTION INTRAMUSCULAR; INTRAVENOUS at 11:33

## 2023-01-01 RX ADMIN — FENTANYL CITRATE 12.5 MCG: 50 INJECTION, SOLUTION INTRAMUSCULAR; INTRAVENOUS at 21:47

## 2023-01-01 RX ADMIN — MORPHINE SULFATE 1 MG: 2 INJECTION, SOLUTION INTRAMUSCULAR; INTRAVENOUS at 02:05

## 2023-01-01 RX ADMIN — MORPHINE SULFATE 4 MG: 4 INJECTION, SOLUTION INTRAMUSCULAR; INTRAVENOUS at 04:49

## 2023-01-01 RX ADMIN — MORPHINE SULFATE 1 MG: 2 INJECTION, SOLUTION INTRAMUSCULAR; INTRAVENOUS at 00:34

## 2023-01-01 RX ADMIN — MORPHINE SULFATE 1 MG: 2 INJECTION, SOLUTION INTRAMUSCULAR; INTRAVENOUS at 00:10

## 2023-01-01 RX ADMIN — LORAZEPAM 1 MG: 2 INJECTION INTRAMUSCULAR; INTRAVENOUS at 23:00

## 2023-01-01 RX ADMIN — MORPHINE SULFATE 4 MG: 4 INJECTION, SOLUTION INTRAMUSCULAR; INTRAVENOUS at 08:00

## 2023-01-01 RX ADMIN — MORPHINE SULFATE 4 MG: 4 INJECTION, SOLUTION INTRAMUSCULAR; INTRAVENOUS at 10:04

## 2023-01-01 RX ADMIN — MORPHINE SULFATE 1 MG: 2 INJECTION, SOLUTION INTRAMUSCULAR; INTRAVENOUS at 03:38

## 2023-01-01 RX ADMIN — MORPHINE SULFATE 1 MG: 2 INJECTION, SOLUTION INTRAMUSCULAR; INTRAVENOUS at 04:13

## 2023-01-01 RX ADMIN — PIPERACILLIN AND TAZOBACTAM 3.38 G: 3; .375 INJECTION, POWDER, FOR SOLUTION INTRAVENOUS at 14:14

## 2023-01-01 RX ADMIN — HALOPERIDOL LACTATE 1 MG: 5 INJECTION, SOLUTION INTRAMUSCULAR at 21:29

## 2023-01-01 RX ADMIN — SODIUM CHLORIDE 1000 ML: 9 INJECTION, SOLUTION INTRAVENOUS at 13:03

## 2023-01-01 RX ADMIN — MORPHINE SULFATE 4 MG: 4 INJECTION, SOLUTION INTRAMUSCULAR; INTRAVENOUS at 02:37

## 2023-01-01 RX ADMIN — MORPHINE SULFATE 1 MG: 2 INJECTION, SOLUTION INTRAMUSCULAR; INTRAVENOUS at 05:41

## 2023-01-01 RX ADMIN — MORPHINE SULFATE 4 MG: 4 INJECTION, SOLUTION INTRAMUSCULAR; INTRAVENOUS at 06:38

## 2023-01-01 RX ADMIN — MORPHINE SULFATE 1 MG: 2 INJECTION, SOLUTION INTRAMUSCULAR; INTRAVENOUS at 14:37

## 2023-01-01 RX ADMIN — MORPHINE SULFATE 1 MG: 2 INJECTION, SOLUTION INTRAMUSCULAR; INTRAVENOUS at 12:23

## 2023-01-01 RX ADMIN — MORPHINE SULFATE 1 MG: 2 INJECTION, SOLUTION INTRAMUSCULAR; INTRAVENOUS at 22:37

## 2023-01-01 RX ADMIN — MORPHINE SULFATE 1 MG: 2 INJECTION, SOLUTION INTRAMUSCULAR; INTRAVENOUS at 10:46

## 2023-01-01 RX ADMIN — SODIUM CHLORIDE 1000 ML: 9 INJECTION, SOLUTION INTRAVENOUS at 14:47

## 2023-01-01 RX ADMIN — MORPHINE SULFATE 1 MG: 2 INJECTION, SOLUTION INTRAMUSCULAR; INTRAVENOUS at 16:17

## 2023-01-01 RX ADMIN — MORPHINE SULFATE 1 MG: 2 INJECTION, SOLUTION INTRAMUSCULAR; INTRAVENOUS at 09:08

## 2023-01-01 RX ADMIN — MORPHINE SULFATE 1 MG: 2 INJECTION, SOLUTION INTRAMUSCULAR; INTRAVENOUS at 22:08

## 2023-01-01 RX ADMIN — MORPHINE SULFATE 1 MG: 2 INJECTION, SOLUTION INTRAMUSCULAR; INTRAVENOUS at 06:01

## 2023-01-01 RX ADMIN — MORPHINE SULFATE 2 MG: 2 INJECTION, SOLUTION INTRAMUSCULAR; INTRAVENOUS at 00:39

## 2023-01-01 RX ADMIN — DEXTROSE AND SODIUM CHLORIDE: 5; 450 INJECTION, SOLUTION INTRAVENOUS at 18:55

## 2023-01-01 RX ADMIN — LORAZEPAM 1 MG: 2 INJECTION INTRAMUSCULAR; INTRAVENOUS at 07:36

## 2023-01-01 RX ADMIN — HALOPERIDOL LACTATE 1 MG: 5 INJECTION, SOLUTION INTRAMUSCULAR at 21:43

## 2023-01-01 RX ADMIN — MORPHINE SULFATE 1 MG: 2 INJECTION, SOLUTION INTRAMUSCULAR; INTRAVENOUS at 01:57

## 2023-01-01 RX ADMIN — PIPERACILLIN AND TAZOBACTAM 3.38 G: 3; .375 INJECTION, POWDER, FOR SOLUTION INTRAVENOUS at 19:48

## 2023-01-01 RX ADMIN — MORPHINE SULFATE 1 MG: 2 INJECTION, SOLUTION INTRAMUSCULAR; INTRAVENOUS at 21:05

## 2023-01-01 RX ADMIN — SODIUM CHLORIDE, POTASSIUM CHLORIDE, SODIUM LACTATE AND CALCIUM CHLORIDE 1000 ML: 600; 310; 30; 20 INJECTION, SOLUTION INTRAVENOUS at 17:34

## 2023-01-01 RX ADMIN — LORAZEPAM 1 MG: 2 INJECTION INTRAMUSCULAR; INTRAVENOUS at 01:01

## 2023-01-01 RX ADMIN — MORPHINE SULFATE 4 MG: 4 INJECTION, SOLUTION INTRAMUSCULAR; INTRAVENOUS at 01:13

## 2023-01-01 RX ADMIN — MORPHINE SULFATE 1 MG: 2 INJECTION, SOLUTION INTRAMUSCULAR; INTRAVENOUS at 14:17

## 2023-01-01 RX ADMIN — MORPHINE SULFATE 1 MG: 2 INJECTION, SOLUTION INTRAMUSCULAR; INTRAVENOUS at 18:41

## 2023-01-01 RX ADMIN — MORPHINE SULFATE 1 MG: 2 INJECTION, SOLUTION INTRAMUSCULAR; INTRAVENOUS at 06:55

## 2023-01-01 RX ADMIN — MORPHINE SULFATE 1 MG: 2 INJECTION, SOLUTION INTRAMUSCULAR; INTRAVENOUS at 20:10

## 2023-01-01 RX ADMIN — MORPHINE SULFATE 1 MG: 2 INJECTION, SOLUTION INTRAMUSCULAR; INTRAVENOUS at 16:55

## 2023-01-01 ASSESSMENT — ACTIVITIES OF DAILY LIVING (ADL)
ADLS_ACUITY_SCORE: 35
ADLS_ACUITY_SCORE: 49
ADLS_ACUITY_SCORE: 41
ADLS_ACUITY_SCORE: 45
ADLS_ACUITY_SCORE: 35
ADLS_ACUITY_SCORE: 47
ADLS_ACUITY_SCORE: 35
ADLS_ACUITY_SCORE: 47
ADLS_ACUITY_SCORE: 35
ADLS_ACUITY_SCORE: 35
ADLS_ACUITY_SCORE: 47
ADLS_ACUITY_SCORE: 35
ADLS_ACUITY_SCORE: 35
ADLS_ACUITY_SCORE: 49
ADLS_ACUITY_SCORE: 51
ADLS_ACUITY_SCORE: 47
ADLS_ACUITY_SCORE: 41
ADLS_ACUITY_SCORE: 35
ADLS_ACUITY_SCORE: 41
ADLS_ACUITY_SCORE: 35
ADLS_ACUITY_SCORE: 49
ADLS_ACUITY_SCORE: 35
ADLS_ACUITY_SCORE: 47
ADLS_ACUITY_SCORE: 47
ADLS_ACUITY_SCORE: 45
ADLS_ACUITY_SCORE: 35
ADLS_ACUITY_SCORE: 49

## 2023-11-22 PROBLEM — D72.829 LEUKOCYTOSIS, UNSPECIFIED TYPE: Status: ACTIVE | Noted: 2023-01-01

## 2023-11-22 PROBLEM — R65.20 SEVERE SEPSIS (H): Status: ACTIVE | Noted: 2023-01-01

## 2023-11-22 PROBLEM — I48.0 PAROXYSMAL ATRIAL FIBRILLATION (H): Status: ACTIVE | Noted: 2023-01-01

## 2023-11-22 PROBLEM — N30.90 BLADDER INFECTION: Status: ACTIVE | Noted: 2023-01-01

## 2023-11-22 PROBLEM — N17.9 ACUTE RENAL FAILURE, UNSPECIFIED ACUTE RENAL FAILURE TYPE (H): Status: ACTIVE | Noted: 2023-01-01

## 2023-11-22 PROBLEM — A41.9 SEVERE SEPSIS (H): Status: ACTIVE | Noted: 2023-01-01

## 2023-11-22 PROBLEM — I24.89 DEMAND ISCHEMIA (H): Status: ACTIVE | Noted: 2023-01-01

## 2023-11-22 PROBLEM — R41.0 DELIRIUM: Status: ACTIVE | Noted: 2023-01-01

## 2023-11-22 PROBLEM — D72.829 LEUKOCYTOSIS: Status: ACTIVE | Noted: 2023-01-01

## 2023-11-22 PROBLEM — R65.20 SEVERE SEPSIS WITH ACUTE ORGAN DYSFUNCTION (H): Status: ACTIVE | Noted: 2023-01-01

## 2023-11-22 PROBLEM — J18.9 PNEUMONIA OF BOTH LOWER LOBES DUE TO INFECTIOUS ORGANISM: Status: ACTIVE | Noted: 2023-01-01

## 2023-11-22 PROBLEM — E86.0 DEHYDRATION: Status: ACTIVE | Noted: 2023-01-01

## 2023-11-22 PROBLEM — A41.9 SEVERE SEPSIS WITH ACUTE ORGAN DYSFUNCTION (H): Status: ACTIVE | Noted: 2023-01-01

## 2023-11-22 NOTE — ED PROVIDER NOTES
History     Chief Complaint:  Altered Mental Status and Tachycardia       HPI   Ila Loredo is a 89 year old female who presents to the ED with a history of atrial fibrillation, thrombocytopenia, and mild dementia with increasing confusion that was noted this morning.  The patient has not had any falls that have been recorded.  She lives in assisted living.  She is here with multiple family members.  The patient is normally wheelchair-bound.  She was found to be increasingly confused this morning with a slight increase in her heart rate consistent with atrial fibrillation with increased ventricular response and also low blood pressure.  The patient's medication list reveals that she takes diltiazem extended release 360 mg daily and that she takes furosemide.  Family denies recent high fevers, chills, sweats, cough, or other complaints.  They do not note any history of recent urinary tract infections.    Of note, the patient has had sepsis in the past.  According to review of the medical record this was caused by group B streptococcus.      Independent Historian:   The patient's daughter and son-in-law are here.  Another family member arrive sometime later.  They normally note that she is more alert and articulate.    Review of External Notes:  None    Allergies:  Codeine     Listed Medications:    Tiazac   Lasix  Levothyroxine   Nystatin     Past Medical and Surgical History:    Atrial fibrillation with RVR  Thrombocytopenia   Type I or II open displaced fracture of left femoral neck  Left hip fracture  Acute anemia  Hypothyroidism  Lymphedema of left leg  Dementia  Heart disease  Hypertension  Left hip replacement  Acute metabolic encephalopathy   Sepsis due to group B Streptococcus   Hypokalemia  Hypomagnesemia   DRISS  Leukocytosis   Hyponatremia   Conductive hearing loss, bilateral   Epidural abscess  Bacteremia due to group B Streptococcus  Toe osteomyelitis  Fib tib break repair  Cataract surgery  "x2    Family History:    family history includes No Known Problems in her father and mother.    Social History:   reports that she quit smoking about 61 years ago. She has never used smokeless tobacco. She reports current alcohol use. She reports that she does not use drugs.      Physical Exam   Patient Vitals for the past 24 hrs:   BP Temp Temp src Pulse Resp SpO2 Height   11/22/23 1645 (!) 91/35 -- -- 73 13 91 % --   11/22/23 1630 92/53 -- -- 93 25 92 % --   11/22/23 1616 -- -- -- 75 24 92 % --   11/22/23 1615 95/52 -- -- 81 -- -- --   11/22/23 1600 (!) 88/69 -- -- 96 27 -- --   11/22/23 1545 93/56 -- -- 78 26 92 % --   11/22/23 1540 -- -- -- 96 19 91 % --   11/22/23 1530 (!) 89/49 -- -- 80 27 92 % --   11/22/23 1520 98/48 -- -- 78 22 90 % --   11/22/23 1510 -- -- -- 71 15 95 % --   11/22/23 1500 104/41 -- -- 87 20 90 % --   11/22/23 1450 93/52 -- -- 92 22 91 % --   11/22/23 1440 93/52 -- -- 85 23 93 % --   11/22/23 1430 95/46 -- -- 71 29 92 % 1.651 m (5' 5\")   11/22/23 1420 (!) 82/55 -- -- 93 22 -- --   11/22/23 1410 -- -- -- 84 23 -- --   11/22/23 1400 96/49 -- -- 79 21 93 % --   11/22/23 1350 -- -- -- 86 19 94 % --   11/22/23 1330 93/55 -- -- 77 -- -- --   11/22/23 1300 92/41 -- -- (!) 133 12 93 % --   11/22/23 1250 (!) 86/48 -- -- 84 21 94 % --   11/22/23 1246 (!) 86/48 -- -- 80 28 94 % --   11/22/23 1245 (!) 86/48 -- -- 67 24 93 % --   11/22/23 1240 -- -- -- 79 (!) 32 95 % --   11/22/23 1230 (!) 74/59 -- -- 86 24 92 % --   11/22/23 1220 96/44 -- -- 82 22 93 % --   11/22/23 1214 (!) 89/48 98.2  F (36.8  C) Oral 80 25 92 % --   11/22/23 1210 (!) 87/57 -- -- 89 28 96 % --        General: Patient is resting on the gurney.  She does open her eyes spontaneously.  She does speak but mainly is mumbling.  It is hard to understand what she is saying.  She does follow commands and can move all extremities upon request.  She is trying to sit up and get out of bed.  Her exam is consistent with delirium at this " juncture.  Head:  The scalp, face, and head appear normal    No signs of trauma.  Eyes:  The pupils are equal, round, and reactive to light    Extraocular muscles are intact    Conjunctivae and sclerae are normal  ENT:    The nose is normal    Pinnae are normal    The oropharynx reveals very dry mucous membranes involving the tongue, the lips are parched, and the posterior pharynx.  Neck:  Normal range of motion  CV:  Irregular rhythm, mainly under , consistent with atrial fibrillation with controlled ventricular response.  Normal underlying rhythm     No pathological murmur detected  Resp:  Lungs are mainly clear    There is MILD tachypnea    Non-labored    I heard some trace posterior left inferior crackles.  GI:  Abdomen is soft, there is no rigidity    There is mild gaseous abdominal distention without pain.    No rebound tenderness     Non-surgical without peritoneal features at this time  MS:  Normal muscular tone    Symmetric motor strength    No major joint effusions  Skin:  No rash or acute skin lesions noted  Neuro:  Normal and fluent speech    No motor deficits    There is some mild delirium.  The patient is awake.  Her eyes are open.  She can follow commands.  It is hard to understand her slightly mumbled speech.  Psych: Awake          Emergency Department Course   EKG:  Heart rate is 91.  Atrial fibrillation with controlled ventricular response.  No evidence of ischemia or infarction.  The rate is 91, QRS duration is 80 ms, QTc is 442 ms the R axis is degrees.  This is read by Dr. Monroy  ECG taken at 1211, ECG read at 1231  Atrial fibrillation  Abnormal ECG   Rate 91 bpm. DE interval * ms. QRS duration 80 ms. QT/QTc 360/442 ms. P-R-T axes * 6 31.    Imaging:  Head CT w/o contrast   Final Result   IMPRESSION:   No evidence of acute intracranial hemorrhage, mass, or   herniation.         ELÍAS DEJESUS MD            SYSTEM ID:  [id number]      XR Chest 2 Views   Final Result   IMPRESSION:  Cardiomegaly. Bibasilar linear and patchy opacities may be   due to atelectasis or infection. Tortuous aortic arch. Mildly enlarged   cardiac silhouette. No pneumothorax.      AMINTA HEARN MD            SYSTEM ID:  [id number]           Reports in this section have been read by the radiologist.    Laboratory:  Labs Ordered and Resulted from Time of ED Arrival to Time of ED Departure   BASIC METABOLIC PANEL - Abnormal       Result Value    Sodium 139      Potassium 3.7      Chloride 103      Carbon Dioxide (CO2) 20 (*)     Anion Gap 16 (*)     Urea Nitrogen 26.5 (*)     Creatinine 1.42 (*)     GFR Estimate 34 (*)     Calcium 8.9      Glucose 89     TROPONIN T, HIGH SENSITIVITY - Abnormal    Troponin T, High Sensitivity 45 (*)    CBC WITH PLATELETS AND DIFFERENTIAL - Abnormal    WBC Count 77.5 (*)     RBC Count 4.00      Hemoglobin 11.7      Hematocrit 35.6      MCV 89      MCH 29.3      MCHC 32.9      RDW 14.5      Platelet Count 71 (*)    ROUTINE UA WITH MICROSCOPIC REFLEX TO CULTURE - Abnormal    Color Urine Orange (*)     Appearance Urine Cloudy (*)     Glucose Urine Negative      Bilirubin Urine Negative      Ketones Urine Trace (*)     Specific Gravity Urine 1.019      Blood Urine Moderate (*)     pH Urine 5.5      Protein Albumin Urine 70 (*)     Urobilinogen Urine 4.0 (*)     Nitrite Urine Negative      Leukocyte Esterase Urine Large (*)     Bacteria Urine Moderate (*)     WBC Clumps Urine Present (*)     Mucus Urine Present (*)     RBC Urine 31 (*)     WBC Urine >182 (*)     Squamous Epithelials Urine 3 (*)    ISTAT GASES LACTATE VENOUS POCT - Abnormal    Lactic Acid POCT 4.6 (*)     Bicarbonate Venous POCT 22      O2 Sat, Venous POCT 29 (*)     pCO2 Venous POCT 39 (*)     pH Venous POCT 7.35      pO2 Venous POCT 20 (*)    DIFFERENTIAL - Abnormal    % Neutrophils 82      % Lymphocytes 0      % Monocytes 3      % Eosinophils 0      % Basophils 0      % Metamyelocytes 6      % Myelocytes 9      Absolute  Neutrophils 63.6 (*)     Absolute Lymphocytes 0.0 (*)     Absolute Monocytes 2.3 (*)     Absolute Eosinophils 0.0      Absolute Basophils 0.0      Absolute Metamyelocytes 4.7 (*)     Absolute Myelocytes 7.0 (*)     RBC Morphology Confirmed RBC Indices      Platelet Assessment        Value: Automated Count Confirmed. Platelet morphology is normal.    Smudge Cells Present (*)    INFLUENZA A/B, RSV, & SARS-COV2 PCR - Normal    Influenza A PCR Negative      Influenza B PCR Negative      RSV PCR Negative      SARS CoV2 PCR Negative     LACTIC ACID WHOLE BLOOD   BLOOD CULTURE   BLOOD CULTURE   URINE CULTURE     Emergency Department Course & Assessments:    Interventions/ED Medications:  Medications   sodium chloride 0.9% BOLUS 1,000 mL (1,000 mLs Intravenous $New Bag 11/22/23 1447)   sodium chloride 0.9% BOLUS 1,000 mL (1,000 mLs Intravenous $New Bag 11/22/23 1303)   piperacillin-tazobactam (ZOSYN) 3.375 g vial to attach to  mL bag (3.375 g Intravenous $New Bag 11/22/23 1414)      Independent Interpretation of Radiology Studies by Dr. Monroy  Poor quality images. Atelectasis vs. Infiltrate in the lung bases.     Assessments/Consultations/Discussion of Management:  1249 I obtained the history and examined the patient as detailed above.   1450 I reassessed the patient.   1512 I spoke with the PAESTHELA, Siddharth Irving, working under Dr. Lopez about patient admission.     Disposition:  The patient was admitted to the hospital under Dr. Lopez.    Impression & Plan    CMS Diagnoses: The patient has signs of Severe Sepsis        If one the following conditions is present, a 30 mL/kg bolus is recommended as part of the 6 hour bundle (IBW can be used for BMI >30, or document refusal/contraindication):      1.   Initial hypotension  defined as 2 bps < 90 or map < 65 in the 6hrs before or 3hrs after time zero.     2.  Lactate >4.      The patient has signs of Severe Sepsis as evidenced by:    1. 2 SIRS criteria, AND  2. Suspected  "infection, AND   3. Organ dysfunction: Lactic Acidosis with value >2.0    Time severe sepsis diagnosis confirmed: 1350  11/22/23 as this was the time when Lactate resulted, and the level was > 2.0    3 Hour Severe Sepsis Bundle Completion:    1. Initial Lactic Acid Result:   Recent Labs   Lab Test 11/22/23  1324   LACT 4.6*     2. Blood Cultures before Antibiotics: Yes  3. Broad Spectrum Antibiotics Administered:  yes       Anti-infectives (From admission through now)      Start     Dose/Rate Route Frequency Ordered Stop    11/22/23 1250  piperacillin-tazobactam (ZOSYN) 3.375 g vial to attach to  mL bag        Note to Pharmacy: For [facility], [facility] and [facility]: For Zosyn-naive patients, use the \"Zosyn initial dose + extended infusion\" order panel.    3.375 g  over 30 Minutes Intravenous ONCE 11/22/23 1247 11/22/23 1444            4. Is initial hypotension present?     Yes. (Definition - 2 SBPs <90, MAP <65, or decrease > 40 from baseline due to infection w/in 3 hrs of each other during the time period of 6 hrs before and 3 hrs after time zero)   Full 30 mL/kg bolus given (see amount below).    BMI Readings from Last 1 Encounters:   04/11/22 24.63 kg/m      30 mL/kg fluids based on weight: Patient actual weight not available.  30 mL/kg fluids based on IBW (must be >= 60 inches tall): Patient ideal weight not available.                    Severe Sepsis reassessment:  1. Repeat Lactic Acid Level within 6 hours of time zero:   2. MAP>65 after initial IVF bolus, will continue to monitor fluid status and vital signs    I attest to having performed a repeat sepsis exam and assessment of perfusion at 1725 and the results demonstrate improved perfusion.       Medical Decision Making:  This patient presents to the emergency department with originally tachycardia and mild hypotension was found to be in atrial fibrillation with rapid ventricular response.  By the time she arrived in the emergency department her heart rate was " essentially normal.  Her blood pressures were soft in the 80/50 range initially.  The patient clinically appears significantly dehydrated and does have evidence of prerenal azotemia and acute renal failure on laboratory testing.  The patient was noted to have trace left basilar crackles, chest x-ray reveals possible atelectasis versus infiltrate.  Zosyn was given intravenously for presumed severe sepsis.  Lactate eventually came back greater than 4 which is likely multifactorial including severe sepsis from a urinary etiology, possible bacteremia, and also significant intravascular volume depletion and poor perfusion.  The patient was given 2 L of crystalloid in the emergency department and still only had approximately 50 cc of urine on bladder scan.  Patient was hydrated with crystalloid and several repeat lactates have been ordered and we will trend this.  After the first liter of IV fluid the patient's blood pressure was 75 in terms of mean arterial pressure.  Vasopressors have not been indicated at this time.  The patient does have some mild delirium.  1 mg of Haldol was administered intravenously.  The patient will be placed in the intermediate care area of the hospital at this time.  The patient was given 30 cc/kg of crystalloid and then maintenance IV fluid was started with D5 half-normal saline.  Additional fluid boluses may be required given the patient's level of dehydration.  I have been cautious given the patient's age.    Total time spent in critical care at the bedside, consultation, and family updates is 45 minutes.      Diagnosis:    ICD-10-CM    1. Pneumonia of both lower lobes due to infectious organism  J18.9       2. Leukocytosis, unspecified type  D72.829       3. Acute renal failure, unspecified acute renal failure type (H24)  N17.9       4. Dehydration  E86.0       5. Paroxysmal atrial fibrillation (H)  I48.0       6. Delirium  R41.0       7. Demand ischemia  I24.89       8. Severe sepsis (H)   A41.9     R65.20       9. Bladder infection  N30.90                 Scribe Disclosure:  I, Guero Zavaletau, am serving as a scribe at 2:53 PM on 11/22/2023 to document services personally performed by Ronaldo Monroy MD based on my observations and the provider's statements to me.   11/22/2023   Ronalod Monroy MD Rock, Michael P, MD  11/22/23 1727

## 2023-11-22 NOTE — ED TRIAGE NOTES
Pt BIBA after staff at her [facility] noted dizziness and confusion and tachycardia starting today, hx A-fib, hypotensive in ambulance sbp 80's, pt only oriented to person, baseline oriented X2 and able to hold a conversation per EMS, pt wheelchair bound at baseline, ABCD intact.       Triage Assessment (Adult)       Row Name 11/22/23 1214          Triage Assessment    Airway WDL WDL        Respiratory WDL    Respiratory WDL WDL        Skin Circulation/Temperature WDL    Skin Circulation/Temperature WDL WDL        Cardiac WDL    Cardiac WDL X     Cardiac Rhythm Atrial fibrillation        Peripheral/Neurovascular WDL    Peripheral Neurovascular WDL WDL        Cognitive/Neuro/Behavioral WDL    Cognitive/Neuro/Behavioral WDL X;level of consciousness     Level of Consciousness confused;lethargic     Orientation disoriented to;place;time;situation        Ruben Coma Scale    Best Eye Response 4-->(E4) spontaneous     Best Motor Response 5-->(M5) localizes pain     Best Verbal Response 4-->(V4) confused     Ruben Coma Scale Score 13

## 2023-11-22 NOTE — PHARMACY-ADMISSION MEDICATION HISTORY
Pharmacist Admission Medication History    Admission medication history is complete. The information provided in this note is only as accurate as the sources available at the time of the update.    Information Source(s): Facility (U/NH/) medication list/MAR via N/A    Pertinent Information: none    Changes made to PTA medication list:  Added: None  Deleted: furosemide, Ensure (not on list from facility, currently marked as not taking, and not a medication either)  Changed: acetaminophen (at bedtime & BID PRN --> BID PRN), Miralax (packet --> powder, PRN --> daily PRN)    Medication Affordability: not assessed    Allergies reviewed with patient and updates made in EHR: no    Medication History Completed By: Jen Buenrostro RPH 11/22/2023 2:09 PM    PTA Med List   Medication Sig Last Dose    ACETAMINOPHEN EXTRA STRENGTH 500 MG tablet 2 TABLETS (1000MG) ORALLY EVERY DAY AT BEDTIME (DX: PAIN) (MAX 4GM TYLENOL OR ACETAMINOPHEN/24 HRS);2 TABLETS (1000MG) ORALLY 2 TIMES DAILY AS NEEDED (DX: PAIN) (MAX 4GM TYLENOL OR ACETAMINOPHEN/24 HRS) (Patient taking differently: Take 1,000 mg by mouth 2 times daily as needed for pain)  at PRN    diltiazem ER (TIAZAC) 360 MG 24 hr ER beaded capsule TAKE 1 CAPSULE BY MOUTH ONCE DAILY 11/22/2023    levothyroxine (SYNTHROID/LEVOTHROID) 100 MCG tablet TAKE 1 TABLET BY MOUTH ONCE DAILY 11/22/2023    NYAMYC 030168 UNIT/GM external powder APPLY TOPICALLY TO AFFECTED AREA(S) THREE TIMES DAILY AS NEEDED  at PRN    polyethylene glycol (MIRALAX) 17 GM/Dose powder Take 17 g by mouth daily as needed for constipation  at PRN    VITAMIN D3 25 MCG (1000 UT) tablet 1 TABLET ORALLY DAILY (DX: SUPPLEMENT) 11/22/2023

## 2023-11-22 NOTE — ED NOTES
Bed: ED02  Expected date:   Expected time:   Means of arrival:   Comments:  [facility] - 521 - 89 F confusion eta 1200

## 2023-11-22 NOTE — ED NOTES
"[facility]  ED Nurse Handoff Report    ED Chief complaint: Altered Mental Status and Tachycardia      ED Diagnosis:   Final diagnoses:   Pneumonia of both lower lobes due to infectious organism   Leukocytosis, unspecified type   Acute renal failure, unspecified acute renal failure type (H24)   Dehydration   Paroxysmal atrial fibrillation (H)   Delirium   Demand ischemia   Severe sepsis (H)   Bladder infection       Code Status: Full Code    Allergies:   Allergies   Allergen Reactions    Codeine        Patient Story: Pt BIBA after staff at her [facility] noted dizziness and confusion and tachycardia starting today, hx A-fib, hypotensive in ambulance sbp 80's, pt only oriented to person, baseline oriented X2 and able to hold a conversation per EMS, pt wheelchair bound at baseline, ABCD intact.   Focused Assessment:  [id number] RVR has reduced to CVR after 1 L NS bolus  Neuro-pt only oriented to self, baseline a/ox2   Resp-WDL  Multiple bruises bilateral arms from multiple IV attempts    Treatments and/or interventions provided: IV abx, IV NS bolus X2,   Patient's response to treatments and/or interventions: reduction in HR, pt more alert    To be done/followed up on inpatient unit:  monitor bp, hr, lactic acid, tele, O2 sats, IV abx    Does this patient have any cognitive concerns?: Disoriented to place,time and situation    Activity level - Baseline/Home:  Total Care  Activity Level - Current:   Total Care    Patient's Preferred language: English   Needed?: No    Isolation: None  Infection: Not Applicable  Patient tested for COVID 19 prior to admission: YES  Bariatric?: No    Vital Signs:   Vitals:    11/22/23 1410 11/22/23 1420 11/22/23 1430 11/22/23 1440   BP:  (!) 82/55 95/46    Pulse: 84 93 71 85   Resp: 23 22 29 23   Temp:       TempSrc:       SpO2:   92% 93%   Height:   1.651 m (5' 5\")        Cardiac Rhythm:Cardiac Rhythm: Atrial fibrillation    Was the PSS-3 completed:   No pt unable to " answer questions at this time  What interventions are required if any?               Family Comments: none  OBS brochure/video discussed/provided to patient/family: N/A              Name of person given brochure if not patient: NA              Relationship to patient: NA    For the majority of the shift this patient's behavior was Green.   Behavioral interventions performed were NA.    ED NURSE PHONE NUMBER: [phone]

## 2023-11-22 NOTE — H&P
[facility]  History and Physical - Hospitalist Service     Date of Admission:  11/22/2023  PRIMARY CARE PROVIDER: No primary care provider on file.    Assessment & Plan   Ila Loredo is a 89 year old female , with a PMH significant for mild dementia, A-fib, thrombocytopenia, HTN, DRISS, CAD,sepsis osteomyelitis w spinal cord injury 2019, who was admitted on 11/22/2023., for altered mentation    Per ED provider Dr Monroy, presents from assisted living, increased A-fib with slight increased heart rate, and newly more confused this morning, WC bound.  low BP. WBC 77!, thought leukemoid, AG 16, CO 20, pH 7.35, Note lactate 4.6, Dx severe sepsis from urinary source.  w Cr 1.42 c/w DRISS, very dry. BP soft, s/p IVF 1l, no pressors. UA grossly infected, Seems to be mild delirium,trying to get out of bed, given 1mg haldol , to help w agitation. Plts 71 chronic . EKG with A-fib rate 91. HCT stable. CXR w/o clr infiltrate. Given zosynNo hypoxia. Trop 45,        Principal Problem:    Severe sepsis with acute organ dysfunction (H)  Active Problems:    Atrial fibrillation, unspecified type (H)    Thrombocytopenia (H24)    Hypothyroidism, unspecified type    Dementia without behavioral disturbance, unspecified dementia type    Leukocytosis    Severe sepsis with acute organ dysfunction (H)  Complex UTI  DRISS on CKD  Presents with new onset confusion, soft BP outpatient, UA consistent complex UTI, presentation consistent with severe sepsis with urosepsis, DRISS, hypotension, delirium.  Marked leukocytosis, rule out leukemoid reactionGiven IVF, LR, Zosyn, cultures in process.  Confused.  Head CT neg. EKG without RVR. CXR without clear respiratory source.  Lactate 4.6, hydrated  2l IVF, then 75/hr will watch closely but avoid volume overload.. CXR does not favor PNA, doubt present, ok w zosyn for now, future rocephin as improved.  Heart rate 70s, BP 90s.    -Admit inpatient  -Status post 2 L IVF  -continue  LR at 75  -lactate continue close trend in 2 hours  -ABX-continue Zosyn every 6 hours, deescalate tomorrow prn  -Urine culture in process  -Blood culture in process  -Avoid nephrotoxins  -Acetaminophen as needed pain, no NSAIDs.   -A.m. CBC BMP    Acute on chronic encephalopathy   Dementia without behavioral disturbance,   Lives in assisted living.  Normally only minimally confused per family, independent.  Family reports marked worsening confusion today in context of above sepsis.  Confused to try to get out of bed in ED. however, calm as I am seeing her.  Though confused.  -Try to redirect nonpharmacologic treatments  -Family staying should help  -Zyprexa as needed agitation  - PT,  OT cog eval  -Treat above infection    Atrial fibrillation, w hx RVR  Suspect mild demand ischemia, type II MI.  Concern on elevated heart rate PTA as reason for admission, but more likely secondary to her infection.  No RVR / tachycardia and it could be secondary to the CCB.   PTA diltiazem 360 daily held with hypotension, but is at risk of AF w RVR is held tool long, try lower dose IR tomorrow.  No CP Troponin elevated 45 likely mild demand ischemia secondary to above infection  -Holding PTA diltiazem  given sepsis, low PB  -tomorrow, add diltiazem IR 60 QID, hold SBP<115 or HR <60  -Trend troponin  -Telemetry    Marked Leukocytosis, r/o leukemoid reaction  Thrombocytopenia (H24)  Admit WBC 77.5.this was thought to be a leukemoid reaction secondary to her infection.  However in chart review looks like her white counts consistently remained 12-14.  No known oncologic disorder.  Has a history of thrombocytopenia but last 215 in 4/2023 71 on admission, current low is probably secondary to acute infection.,  No known ITP.  We will recheck WBC tonight after IV hydration.  She has been a difficult IV stick so if it is too difficult we will just delayed in the morning.  -Recheck WBC tonight ( if can get labs drawn- tough)  -A.m.  CBC  -Watch closely, consider future peripheral smear, oncology consult.    History of osteomyelitis, spinal cord injury  Chronic urinary and bowel incontinence  -2019 had osteomyelitis that resulted in spinal cord injury per family.  She is a chronic urinary and bowel incontinence since.  No change.  Is insensate.  Has been wheelchair-bound since.  - PT OT    Hypothyroidism, unspecified type  -PTA Synthroid 100 to resume    Clinically Significant Risk Factors Present on Admission                # Thrombocytopenia: Lowest platelets = 71 in last 2 days, will monitor for bleeding     # Dementia: noted on problem list                Diet:  Regular  DVT Prophylaxis: Pneumatic Compression Devices  Forrest Catheter: Not present  Lines: None     Cardiac Monitoring: None  Code Status:  DNR/DNI per discussion with daughter, past wishes    Disposition: Admit inpatient, expect greater than 2 midnights    The patient has been discussed in detail with Dr. Lopez, hospitalist, who agrees with the assessment and plan as noted  The patient's care was discussed with the Attending Physician, Dr. Lopez, Bedside Nurse, Patient, Patient's Family, and ED MD Dr Bernard in detail .  Entered: Siddharth Irving PA-C 11/22/2023, 3:10 PM       Siddharth Irving PA-C  [facility]  Securely message with the Vocera Web Console (learn more here)  Text page via [employer] Paging/Directory      The above form was partially completed using Dragon voice dictation software.  At times inadvertent word substitutions or word omissions may occur, not seen on proof read. Should any part of the documentation be unclear or otherwise contradictory, reader should please contact me for clarification.    ______________________________________________________________________    Chief Complaint   Confusion    History of Present Illness   History is obtained from the ED provider, patient and EMR.  Pt is considered an Unreliable historian due to acute  encephalopathy confusion secondary to sepsis. Patient's daughter and son-in-law are both here and helpful confirmatory historians.    -When asked what brought her in the patient really cannot tell me.  She is able to get some aspects of her recent history correct but is very hard to hear and hypophonic and clearly confused.  Patient denies current symptoms at present denies any UTI.  Family points out that she is insensate and would know if she has bowel or bladder incontinence nor UTI symptoms due to her history of spinal cord injury from 2019's last infection.  She normally uses adult depends, no as needed straight cath.  Patient was earlier complaining of some vague bilateral leg and arm pain which was thought secondary to being uncomfortable in the bed.  During my visit she denies she has any arm pain at all.  For me she denies she has any chest pain or pressure, no shortness of breath no cough.  No fevers or chills.  No UTI symptoms and no abdominal symptoms.  But again questionable history.  She is not able to tell me details on orientation.    -Daughter states that patient has been DNR/DNI for years per her recollection and should remain so.    Past Medical History    I have reviewed this patient's medical history and updated it with pertinent information if needed.   Past Medical History:   Diagnosis Date    Anemia     Dementia (H)     Heart disease     Hypertension     Hypothyroidism     Lymphedema     left leg    Thrombocytopenia (H)      Prior to Admission Medications   Prior to Admission Medications   Prescriptions Last Dose Informant Patient Reported? Taking?   ACETAMINOPHEN EXTRA STRENGTH 500 MG tablet  at [facility] No Yes   Si TABLETS (1000MG) ORALLY EVERY DAY AT BEDTIME (DX: PAIN) (MAX 4GM TYLENOL OR ACETAMINOPHEN/24 HRS);2 TABLETS (1000MG) ORALLY 2 TIMES DAILY AS NEEDED (DX: PAIN) (MAX 4GM TYLENOL OR ACETAMINOPHEN/24 HRS)   Patient taking differently: Take 1,000 mg by mouth 2 times daily  as needed for pain   NYAMYC 743136 UNIT/GM external powder  at [facility] No Yes   Sig: APPLY TOPICALLY TO AFFECTED AREA(S) THREE TIMES DAILY AS NEEDED   VITAMIN D3 25 MCG (1000 UT) tablet 2023 Nursing Home No Yes   Si TABLET ORALLY DAILY (DX: SUPPLEMENT)   diltiazem ER (TIAZAC) 360 MG 24 hr ER beaded capsule 2023 Nursing Home No Yes   Sig: TAKE 1 CAPSULE BY MOUTH ONCE DAILY   levothyroxine (SYNTHROID/LEVOTHROID) 100 MCG tablet 2023 Nursing Home No Yes   Sig: TAKE 1 TABLET BY MOUTH ONCE DAILY   polyethylene glycol (MIRALAX) 17 GM/Dose powder  at [facility] Yes Yes   Sig: Take 17 g by mouth daily as needed for constipation      Facility-Administered Medications: None     Allergies   Allergies   Allergen Reactions    Codeine            Physical Exam   Vitals:    23 1410 23 1420 23 1430 23 1440   BP:  (!) 82/55 95/46    Pulse: 84 93 71 85   Resp:    Temp:       TempSrc:       SpO2:   92% 93%       Vital Signs: Temp: 98.2  F (36.8  C) Temp src: Oral BP: 95/46 Pulse: 85   Resp: 23 SpO2: 93 % O2 Device: None (Room air)    Weight: 0 lbs 0 oz    Constitutional: Awake, alert, cooperative, no apparent distress.   ENT: Normocephalic, Normal sclera.    Neck: No JVD.  Pulmonary: On room air, no increased work of breathing, good air exchange, clear to auscultation bilaterally, no crackles or wheezing.  Cardiovascular: Regular rate and rhythm, normal S1 and S2, without murmur.  GI: Normal bowel sounds, soft, non-distended, non-tender.  Obese.  Skin/Integumen: Visualized skin appeared clear.  Neuro: Nonfocal, no facial droop.  Psych:  confused Somewhat mumbling speech hypophonic. Alert and oriented to person, not to events.  She was agitated earlier but improved now with family.  Calm.. Normal affect.  Extremities: No lower extremity edema noted  : no Forrest      Medical Decision Making       Please see A&P for additional details of medical decision  making.  MANAGEMENT DISCUSSED with the following over the past 24 hours:     NOTE(S)/MEDICAL RECORDS REVIEWED over the past 24 hours:            Data   Data reviewed today: I reviewed all medications, new labs and imaging results over the last 24 hours.     Labs:   CBC with Diff:  Recent Labs   Lab Test 11/22/23  1225   WBC 77.5*   HGB 11.7   MCV 89   PLT 71*      Comprehensive Metabolic Panel:  Recent Labs   Lab 11/22/23  1225      POTASSIUM 3.7   CHLORIDE 103   CO2 20*   ANIONGAP 16*   GLC 89   BUN 26.5*   CR 1.42*   GFRESTIMATED 34*   MONY 8.9     Venous Blood Gas  Recent Labs   Lab 11/22/23  1324   PHV 7.35   PCO2V 39*   PO2V 20*   HCO3V 22     Lactic Acid:    Lab Results   Component Value Date    LACT 4.6 11/22/2023       UA:  Recent Labs   Lab 11/22/23  1405   COLOR Orange*   APPEARANCE Cloudy*   URINEGLC Negative   URINEBILI Negative   URINEKETONE Trace*   SG 1.019   UBLD Moderate*   URINEPH 5.5   PROTEIN 70*   NITRITE Negative   LEUKEST Large*   RBCU 31*   WBCU >182*       IMAGING:   I personally reviewed the radiographsHead CT, and agree w pertinent findings as per radiology interpretation as noted below      Head CT w/o contrast: 11/22/2023  FINDINGS: There is no evidence of intracranial hemorrhage, mass, acute infarct or anomaly. Chronic right cerebellar infarct. The ventricles are normal in size, shape and configuration. Mild diffuse parenchymal volume loss. Moderate patchy periventricular white matter hypodensities which are nonspecific, but likely related to chronic microvascular ischemic disease. Bilateral carotid siphon atherosclerotic calcifications. The visualized portions of the sinuses and mastoids appear normal. The bony calvarium and bones of the skull base appear intact.   IMPRESSION:   No evidence of acute intracranial hemorrhage, mass, or herniation.    XR Chest 2 Views 11/22/2023  IMPRESSION: Cardiomegaly. Bibasilar linear and patchy opacities may be due to atelectasis or infection.  Tortuous aortic arch. Mildly enlarged cardiac silhouette. No pneumothorax.            EKG Interpretation:      Interpreted by Siddharth Irving PA-C  Clinical Impression:   Atrial fibrillation, rate 91  -No acute ischemic changes including an elevated ST or depressed.  -No significant ectopy  -Normal QTc.    ------------------------- PAST 24 HR DATA REVIEWED -----------------------------------------------    I have personally reviewed the following data over the past 24 hrs:    77.5 (HH)  \   11.7   / 71 (L)     139 103 26.5 (H) /  89   3.7 20 (L) 1.42 (H) \     Trop: 45 (H) BNP: N/A     Procal: N/A CRP: N/A Lactic Acid: 4.6 (HH)           The above form was partially completed using Dragon voice dictation software.  At times inadvertent word substitutions or word omissions may occur, not seen on proof read. Should any part of the documentation be unclear or otherwise contradictory, reader is encouraged to please contact me for clarification.

## 2023-11-23 NOTE — PROGRESS NOTES
[facility]    Medicine Progress Note - Hospitalist Service    Date of Admission:  11/22/2023    Assessment & Plan   Ila Loredo is a 89 year old female with a PMH significant for dementia, afib, SCI (wheelchair bound) admitted on 11/22/2023 for septic shock due to complicated UTI.    Severe sepsis with acute organ dysfunction  Complicated UTI   Severe right hydronephrosis   Leukocytosis  Thrombocytopenia  DRISS on CKD   Acute on Chronic encephalopathy  Atrial fibrillation  Type II NSTEMI  Patient transferred to ICU shortly after admission on 11/22 for ongoing hypotension despite fluid resuscitation with 3L crystalloid. Patient required multiple vasopressors. Urology evaluated patient for consideration of ureteral stent and nephrostomy tube, however patient's family came decision to transition patient to comfort care.   - Continue comfort care measures  - No vital signs  - No lab draws or bedside glucose checks  - GIP hospice consulted; appreciate their involvement in Ms. Loredo's care  - Continue PRN Morphine for pain and dyspnea  - Continue PRN Ativan for anxiety   - Continue PRN Robinul for secretions  - Continue PRN Zyprexa for agitation     Other chronic comorbidities  - Hypothyroidism  - Osteomyelitis; spinal cord injury  - Chronic urinary and bowel incontinence  - Dementia with behavioral disturbance      Diet: Combination Diet Regular Diet Adult    DVT Prophylaxis: None; comfort cares  Forrest Catheter: Not present  Lines: None     Cardiac Monitoring: ACTIVE order. Indication: sepsis, w hx AF w RVR  Code Status: No CPR- Do NOT Intubate      Clinically Significant Risk Factors Present on Admission                # Thrombocytopenia: Lowest platelets = 51 in last 2 days, will monitor for bleeding     # Dementia: noted on problem list               Disposition Plan      Expected Discharge Date: 11/24/2023                  The patient's care was discussed with the Attending  Physician, Dr. Stewart, Bedside Nurse, and Patient's Family.    Damian Bautista NP  Hospitalist Service  [facility]  Securely message with Dr. Z (more info)  Text page via [employer] Paging/Directory   ______________________________________________________________________    Interval History   Patient transitioned to comfort cares.     Physical Exam   Vital Signs: Temp: 98.2  F (36.8  C) Temp src: Oral BP: 98/55 Pulse: 112   Resp: (!) 38 SpO2: (!) 79 % O2 Device: None (Room air) Oxygen Delivery: 2 LPM  Weight: 118 lbs 0 oz  General:  Obtunded. Not in acute distress.   Skin:  Warm, dry. No rashes or lesions on exposed skin.  HEENT:  Normocephalic, atraumatic.  Chest:  Non labored breathing pattern, on room air.  Neurological:  Unresponsive.          Medical Decision Making       45 MINUTES SPENT BY ME on the date of service doing chart review, history, exam, documentation & further activities per the note.      Data     I have personally reviewed the following data over the past 24 hrs:    82.8 (HH)  \   10.8 (L)   / 51 (L)     139 103 26.5 (H) /  89   3.7 20 (L) 1.42 (H) \     Trop: 45 (H) BNP: N/A     Procal: N/A CRP: N/A Lactic Acid: 3.9 (H)         Imaging results reviewed over the past 24 hrs:   Recent Results (from the past 24 hour(s))   XR Chest 2 Views    Narrative    XR CHEST 2 VIEWS 11/22/2023 1:12 PM    HISTORY: Change in mental status, pneumonia    COMPARISON: None available      Impression    IMPRESSION: Cardiomegaly. Bibasilar linear and patchy opacities may be  due to atelectasis or infection. Tortuous aortic arch. Mildly enlarged  cardiac silhouette. No pneumothorax.    AMINTA HEARN MD         SYSTEM ID:  [id number]   Head CT w/o contrast    Narrative    CT SCAN OF THE HEAD WITHOUT CONTRAST   11/22/2023 1:52 PM     HISTORY: Delirium, bleed, mass    TECHNIQUE:  Axial images of the head and coronal reformations without  IV contrast material. Radiation dose for this scan was reduced  using  automated exposure control, adjustment of the mA and/or kV according  to patient size, or iterative reconstruction technique.    COMPARISON: None.    FINDINGS: There is no evidence of intracranial hemorrhage, mass, acute  infarct or anomaly. Chronic right cerebellar infarct. The ventricles  are normal in size, shape and configuration. Mild diffuse parenchymal  volume loss. Moderate patchy periventricular white matter  hypodensities which are nonspecific, but likely related to chronic  microvascular ischemic disease. Bilateral carotid siphon  atherosclerotic calcifications.    The visualized portions of the sinuses and mastoids appear normal. The  bony calvarium and bones of the skull base appear intact.       Impression    IMPRESSION:   No evidence of acute intracranial hemorrhage, mass, or  herniation.      ELÍAS DEJESUS MD         SYSTEM ID:  [id number]   CT Abdomen Pelvis w/o Contrast    Narrative    EXAM: CT ABDOMEN PELVIS W/O CONTRAST  LOCATION: [facility]  DATE: 11/22/2023    INDICATION: Urosepsis, in shock, evaluate for hydronephrosis and renal stone  COMPARISON: None.  TECHNIQUE: CT scan of the abdomen and pelvis was performed without IV contrast. Multiplanar reformats were obtained. Dose reduction techniques were used.  CONTRAST: None.    FINDINGS:   LOWER CHEST: Bibasilar consolidation, probably dependent atelectasis. Partially imaged coronary artery calcifications.    HEPATOBILIARY: Normal.    PANCREAS: Scattered parenchymal calcifications consistent with chronic pancreatitis. No main duct dilation or peripancreatic fat stranding.    SPLEEN: Normal.    ADRENAL GLANDS: Normal.    KIDNEYS/BLADDER: Severe asymmetric right pelvicalyceal dilation with abrupt transition to normal caliber ureter. No obstructing stone or urothelial thickening. Left kidney is unremarkable. The bladder is partially obscured by streak artifact.    BOWEL: Diverticulosis of the colon. No acute  inflammatory change. No obstruction.     LYMPH NODES: Normal.    VASCULATURE: Moderate multifocal atherosclerotic calcification. 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    MUSCULOSKELETAL: Osteopenia and degenerative changes in the spine. Left hip arthroplasty.      Impression    IMPRESSION:   1.  Severe right pelvicalyceal dilation, but no obstructing ureteral calculus. Etiology uncertain and characterization is limited without IV contrast, but could be secondary to UPJ obstruction or ureteral stricture.    2.  Abnormal endometrial thickening up to 3.3 cm. Recommend further evaluation with pelvic ultrasound.    3.  Chronic pancreatitis.    4.  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

## 2023-11-23 NOTE — PLAN OF CARE
Comfort measures continued. Pt appears calm, restful, unresponsive except w/ oral cares. Bladder scan, did not meet criteria for straight cath. Pt's family updated on POC at bedside, supportive. Report called to [facility], pt transferred.      Plan of Care Reviewed With: patient, child, grandchild(javier)    Overall Patient Progress: decliningOverall Patient Progress: declining     Jessica Barber RN

## 2023-11-23 NOTE — ED NOTES
Talked with Dr. Lopez about pt's trending blood pressures. Dr Lopez contacting ICU for possible pressors.

## 2023-11-23 NOTE — PROGRESS NOTES
Decision to stop PICC placement, as comfort care measures being initiated per MD Christensen.  PIV access x 2 obtained for medication administration.  See flowsheet.

## 2023-11-23 NOTE — PROGRESS NOTES
[facility]  _________________________________________________________________    [facility] Contact Number: [phone]    - Providers: Please contact [facility] with changes in orders or clinical plan of care   - Nursing: Please contact [facility] with significant changes in patient condition    Hospice will notify the care team (including the hospitalist) to confirm date of inpatient hospice (GIP) admission.    New Epic encounter will not be created until hospice completes admission.   _____________________________________________________________________    REFERRAL RECEIVED.   AC SPOKE WITH PATIENTS NURSEKATIE TO CALL FAMILY TO SCHEDULE.

## 2023-11-23 NOTE — H&P
After discussion with the family and Urology present, the family made the decision to transition to comfort measures. Comfort care orderset was initiated, family is present at the bedside.     Epifanio Christensen

## 2023-11-23 NOTE — PLAN OF CARE
Orientation: KARON, somnolent  Activity: A/2, WC bound at baseline. T/Rq2h  Diet/BS Checks: NPO  Tele:    IV Access/Drains: PIV SL x2  Pain Management: PRN morphine given   Abnormal VS/Results:   Bowel/Bladder: Bladder scan for 142 ml  Skin/Wounds: Non-blanchable skin to coccyx and back, scattered bruising  Consults: GIP hospice  D/C Disposition: [facility] plan to meet with family tomorrow around 0900  Other Info: Pt transferred up from ICU on comfort cares. PO cares provided. Family at bedside

## 2023-11-23 NOTE — H&P
[facility] History and Physical    Ila Loredo MRN# [id number]   Age: 89 year old YOB: 1930     Date of Admission:  11/22/2023    Primary care provider: Selene Ham Physician          Assessment and Plan:   Assessment:   This is a 89 yo female with acute septic shock and urosepsis who presents with a leukemoid reaction (WBC 80s) who is s/p 3L volume resuscitation and persistently hypotensive.  She has minimal past medical history except for a A fib, thrombocytopenia, and mild dementia. She is in acute septic shock with A. Fib with RVR (-170s), Sats 90. Daughter (PoA) is present.       Plan:   N: Confusion with mild dementia, judicious narcotic use  C: Hypotensive s/p fluid resuscitation. Central line access, continue norepinephrine      Patient is DNR      - Cardiac Monitor      - q4 hour lactate until normalize  P: Patient is DNI, O2 support as needed  FEN/GI: NPO, MIVF LR at 75 ml/hr  : Urosepsis,     - STAT CT scan to evaluate for kidney stone and need for urgent decompression with urology   - CT +, Urology consulted, emergently en route  ID: Continue Zosyn, F/U Cultures  HEME: Known thrombocytopenia  DISPO: ICU  Status: Critically Ill    I had a long discussion with family about goals of care. To have any meaningful chance at recovery she will need source control and either stenting or perc nephrostomy. We also discussed potentially rescinding her DNR/DNI during the procedures. We discussed best and worst case scenario, what survival would likely look like in the event that she survived. We discussed both options of comfort measures vs aggressive care. We discussed that she may get sicker before she gets better. We discussed that if she is intubated for her procedure she may remain intubated post-procedure for a period of time or prolonged. The patient's family is currently discussing options to come to a decision. The patient is altered with confusion and  unable to make a decision at this time.              Chief Complaint:   Confusion, presents from assisted living, diagnosed with urosepsis in shock in the ED     Indication for critical care admission:  hypotension, shock, and presumed sepsis             Past Medical History:     Past Medical History:   Diagnosis Date    Anemia     Dementia (H)     Heart disease     Hypertension     Hypothyroidism     Lymphedema     left leg    Thrombocytopenia (H)              Past Surgical History:     Past Surgical History:   Procedure Laterality Date    ATRIAL FIBRILLATION/FLUTTER N/A     a.fib    JOINT REPLACEMENT Left     hip             Social History:     Social History     Tobacco Use    Smoking status: Former     Types: Cigarettes     Quit date:      Years since quittin.9    Smokeless tobacco: Never   Substance Use Topics    Alcohol use: Yes     Comment: 2-3 glasses of wine a week             Family History:     Family History   Problem Relation Age of Onset    No Known Problems Mother     No Known Problems Father      Family history reviewed and updated in [facility]          Immunizations:     Immunization History   Administered Date(s) Administered    COVID-19 Monovalent 12+ (Pfizer ) 2022    COVID-19 Monovalent 18+ (Moderna) 2021, 2021, 2021             Allergies:     Allergies   Allergen Reactions    Codeine              Medications:     Current Facility-Administered Medications   Medication    acetaminophen (TYLENOL) tablet 650 mg    calcium carbonate (TUMS) chewable tablet 1,000 mg    [START ON 2023] diltiazem (CARDIZEM) tablet 60 mg    haloperidol lactate (HALDOL) injection 1 mg    [START ON 2023] levothyroxine (SYNTHROID/LEVOTHROID) tablet 100 mcg    lidocaine (LMX4) cream    lidocaine (LMX4) cream    lidocaine 1 % 0.1-1 mL    lidocaine 1 % 0.1-5 mL    melatonin tablet 1 mg    norepinephrine (LEVOPHED) 4 mg in  mL PERIPHERAL infusion    OLANZapine zydis (zyPREXA)  ODT half-tab 2.5-5 mg    ondansetron (ZOFRAN ODT) ODT tab 4 mg    Or    ondansetron (ZOFRAN) injection 4 mg    piperacillin-tazobactam (ZOSYN) 3.375 g vial to attach to  mL bag    polyethylene glycol (MIRALAX) Packet 17 g    senna-docusate (SENOKOT-S/PERICOLACE) 8.6-50 MG per tablet 1 tablet    Or    senna-docusate (SENOKOT-S/PERICOLACE) 8.6-50 MG per tablet 2 tablet    sodium chloride (PF) 0.9% PF flush 10-40 mL    sodium chloride (PF) 0.9% PF flush 3 mL    sodium chloride (PF) 0.9% PF flush 3 mL     Current Outpatient Medications   Medication Sig    ACETAMINOPHEN EXTRA STRENGTH 500 MG tablet 2 TABLETS (1000MG) ORALLY EVERY DAY AT BEDTIME (DX: PAIN) (MAX 4GM TYLENOL OR ACETAMINOPHEN/24 HRS);2 TABLETS (1000MG) ORALLY 2 TIMES DAILY AS NEEDED (DX: PAIN) (MAX 4GM TYLENOL OR ACETAMINOPHEN/24 HRS) (Patient taking differently: Take 1,000 mg by mouth 2 times daily as needed for pain)    diltiazem ER (TIAZAC) 360 MG 24 hr ER beaded capsule TAKE 1 CAPSULE BY MOUTH ONCE DAILY    levothyroxine (SYNTHROID/LEVOTHROID) 100 MCG tablet TAKE 1 TABLET BY MOUTH ONCE DAILY    NYAMYC 218127 UNIT/GM external powder APPLY TOPICALLY TO AFFECTED AREA(S) THREE TIMES DAILY AS NEEDED    polyethylene glycol (MIRALAX) 17 GM/Dose powder Take 17 g by mouth daily as needed for constipation    VITAMIN D3 25 MCG (1000 UT) tablet 1 TABLET ORALLY DAILY (DX: SUPPLEMENT)             Review of Systems:   The Review of Systems is negative other than noted in the HPI     Constitutional: Awake, confused, altered   ENT: Normocephalic, Normal sclera.    Neck: No JVD.  Pulmonary: On face mask, increased work of breathing  Cardiovascular: Regular rate and rhythm, normal S1 and S2, without murmur.  GI: Normal bowel sounds, soft, non-distended, non-tender.  Obese.  Skin/Integumen: Visualized skin appeared clear.  Neuro: Nonfocal, no facial droop.  Psych:  onfused  Extremities: No lower extremity edema noted            Data:   All laboratory data  reviewed  All cardiac studies reviewed by me.  All imaging studies reviewed by me.     Attestation:  I have reviewed today's vital signs, notes, medications, labs and imaging.    Caio Christensen MD

## 2023-11-23 NOTE — CONSULTS
[facility] Urology Consultation    Ila Loredo MRN# [id number]   Age: 89 year old YOB: 1930     Date of Admission:  11/22/2023    Reason for consult: Right hydronephrosis, septic shock       Requesting physician: Caio Christensen MD       Level of consult: One-time consult to assist in determining a diagnosis and to recommend an appropriate treatment plan           Assessment and Recommendation:   Assessment:    89 year old F with dementia, afib, SCI,(wheelchair bound), with septic shock due to urinary source, found to have severe right hydronephrosis likely due to chronic ureteropelvic junction obstruction. She is on multiple pressors and is critically ill. Notably the patient is DNI and has made her wishes very clear in the past. Had a long discussion with the patient's family members including daughter and son. Options include 1) comfort care 2) proceeding to OR for cystoscopy and stent placement emergently 3) right nephrostomy tube placement. Even with an intervention, patient's mortality is high, and likely will need intubation for respiratory failure which patient has been adamant about not wanting. If placing a stent, would need regular exchanges under anesthesia indefinitely. Patient's family opts to proceed with comfort care      Recommendations:   Comfort care per family members wishes    Thank you for allowing me to participate in the care of this patient    Isac Gonzalez MD   [facility]  [phone] clinic phone               Chief Complaint:   Septic shock, right hydronephrosis, urosepsis     History is obtained from the patient         History of Present Illness:   This patient is a 89 year old F with dementia, afib, SCI,(wheelchair bound), who presents with the following condition requiring a hospital admission:    Right hydronephrosis, septic shock, urosepsis    Patient is confused, in the ICU, on restraints, I talked to the patient's daughter and  son    Presented with confusion, hypotension, significant leukocytosis to 77k. UA with pyuria. Now in the ICU about to get PICC line d/t poor IV access. Received broad spectrum abx            Past Medical History:   I have reviewed this patient's past medical history  Past Medical History:   Diagnosis Date    Anemia     Dementia (H)     Heart disease     Hypertension     Hypothyroidism     Lymphedema     left leg    Thrombocytopenia (H)              Past Surgical History:   I have reviewed this patient's past surgical history  Past Surgical History:   Procedure Laterality Date    ATRIAL FIBRILLATION/FLUTTER N/A     a.fib    JOINT REPLACEMENT Left     hip             Social History:     Social History     Tobacco Use    Smoking status: Former     Types: Cigarettes     Quit date:      Years since quittin.9    Smokeless tobacco: Never   Substance Use Topics    Alcohol use: Yes     Comment: 2-3 glasses of wine a week             Family History:     Family History   Problem Relation Age of Onset    No Known Problems Mother     No Known Problems Father      Family history not discussed          Immunizations:     Immunization History   Administered Date(s) Administered    COVID-19 Monovalent 12+ (Pfizer ) 2022    COVID-19 Monovalent 18+ (Moderna) 2021, 2021, 2021             Allergies:     Allergies   Allergen Reactions    Codeine Nausea and Vomiting             Medications:     Current Facility-Administered Medications   Medication    acetaminophen (TYLENOL) tablet 650 mg    amiodarone (CORDARONE) 1.8 mg/mL in sodium chloride 0.9% 500 mL ADULT STANDARD infusion    [START ON 2023] amiodarone (CORDARONE) 1.8 mg/mL in sodium chloride 0.9% 500 mL ADULT STANDARD infusion    calcium carbonate (TUMS) chewable tablet 1,000 mg    carboxymethylcellulose PF (REFRESH PLUS) 0.5 % ophthalmic solution 1-2 drop    [START ON 2023] diltiazem (CARDIZEM) tablet 60 mg    glycopyrrolate  (ROBINUL) injection 0.2 mg    [START ON 11/23/2023] levothyroxine (SYNTHROID/LEVOTHROID) tablet 100 mcg    lidocaine (LMX4) cream    lidocaine (LMX4) cream    lidocaine 1 % 0.1-1 mL    lidocaine 1 % 0.1-5 mL    LORazepam (ATIVAN) injection 1 mg    Or    LORazepam (ATIVAN) tablet 1 mg    melatonin tablet 1 mg    morphine (PF) injection 1 mg    morphine (PF) injection 2 mg    naloxone (NARCAN) injection 0.1 mg    naloxone (NARCAN) injection 0.2 mg    norepinephrine (LEVOPHED) 4 mg in  mL PERIPHERAL infusion    OLANZapine zydis (zyPREXA) ODT half-tab 2.5-5 mg    ondansetron (ZOFRAN ODT) ODT tab 4 mg    Or    ondansetron (ZOFRAN) injection 4 mg    piperacillin-tazobactam (ZOSYN) 3.375 g vial to attach to  mL bag    polyethylene glycol (MIRALAX) Packet 17 g    senna-docusate (SENOKOT-S/PERICOLACE) 8.6-50 MG per tablet 1 tablet    Or    senna-docusate (SENOKOT-S/PERICOLACE) 8.6-50 MG per tablet 2 tablet    sodium chloride (PF) 0.9% PF flush 10-40 mL    sodium chloride (PF) 0.9% PF flush 3 mL    sodium chloride (PF) 0.9% PF flush 3 mL             Review of Systems:   Review of systems is not obtainable due to patient factors - age, confusion, and critical condition          Physical Exam:   Vitals were reviewed  Temp: 98.2  F (36.8  C) Temp src: Oral BP: (!) 156/67 Pulse: (!) 128   Resp: (!) 31 SpO2: 100 % O2 Device: Nasal cannula Oxygen Delivery: 2 LPM  Constitutional:   Appears critically ill   Eyes:   Eyes open   ENT:   Nc/at   Neck:   Not examined   Hematologic / Lymphatic:   Not examined   Back:   Not examined   Lungs:   On mask O2, tachypneic   Cardiovascular:   tachycardic   Abdomen:   Not examined   Chest / Breast:   Not examined   Genitounirinary:   Not examined   Musculoskeletal:   Not examined   Neurologic:   Moving upper extremities   Neuropsychiatric:   Level of consciousness: lethargic   Skin:   No obvious bruising or bleeding             Data:   All laboratory and imaging data in the past 24  hours reviewed  Results for orders placed or performed during the hospital encounter of 11/22/23 (from the past 24 hour(s))   EKG 12 lead   Result Value Ref Range    Systolic Blood Pressure  mmHg    Diastolic Blood Pressure  mmHg    Ventricular Rate 91 BPM    Atrial Rate  BPM    TN Interval  ms    QRS Duration 80 ms     ms    QTc 442 ms    P Axis  degrees    R AXIS 6 degrees    T Axis 31 degrees    Interpretation ECG       Atrial fibrillation  Abnormal ECG  No previous ECGs available  Confirmed by GENERATED REPORT, COMPUTER ([id number]),  Kelly Jasmine ([id number]) on 11/22/2023 3:33:28 PM     Basic metabolic panel   Result Value Ref Range    Sodium 139 135 - 145 mmol/L    Potassium 3.7 3.4 - 5.3 mmol/L    Chloride 103 98 - 107 mmol/L    Carbon Dioxide (CO2) 20 (L) 22 - 29 mmol/L    Anion Gap 16 (H) 7 - 15 mmol/L    Urea Nitrogen 26.5 (H) 8.0 - 23.0 mg/dL    Creatinine 1.42 (H) 0.51 - 0.95 mg/dL    GFR Estimate 34 (L) >60 mL/min/1.73m2    Calcium 8.9 8.2 - 9.6 mg/dL    Glucose 89 70 - 99 mg/dL   CBC with platelets + differential    Narrative    The following orders were created for panel order CBC with platelets + differential.  Procedure                               Abnormality         Status                     ---------                               -----------         ------                     CBC with platelets and d...[[id number]]  Abnormal            Final result               Manual Differential[[id number]]          Abnormal            Final result                 Please view results for these tests on the individual orders.   Troponin T, High Sensitivity   Result Value Ref Range    Troponin T, High Sensitivity 45 (H) <=14 ng/L   [facility] Draw    Narrative    The following orders were created for panel order [facility] Draw.  Procedure                               Abnormality         Status                     ---------                               -----------         ------                     Extra Blue  Top Tube[[id number]]                              Final result                 Please view results for these tests on the individual orders.   CBC with platelets and differential   Result Value Ref Range    WBC Count 77.5 (HH) 4.0 - 11.0 10e3/uL    RBC Count 4.00 3.80 - 5.20 10e6/uL    Hemoglobin 11.7 11.7 - 15.7 g/dL    Hematocrit 35.6 35.0 - 47.0 %    MCV 89 78 - 100 fL    MCH 29.3 26.5 - 33.0 pg    MCHC 32.9 31.5 - 36.5 g/dL    RDW 14.5 10.0 - 15.0 %    Platelet Count 71 (L) 150 - 450 10e3/uL   Extra Blue Top Tube   Result Value Ref Range    Hold Specimen JIC    Manual Differential   Result Value Ref Range    % Neutrophils 82 %    % Lymphocytes 0 %    % Monocytes 3 %    % Eosinophils 0 %    % Basophils 0 %    % Metamyelocytes 6 %    % Myelocytes 9 %    Absolute Neutrophils 63.6 (H) 1.6 - 8.3 10e3/uL    Absolute Lymphocytes 0.0 (L) 0.8 - 5.3 10e3/uL    Absolute Monocytes 2.3 (H) 0.0 - 1.3 10e3/uL    Absolute Eosinophils 0.0 0.0 - 0.7 10e3/uL    Absolute Basophils 0.0 0.0 - 0.2 10e3/uL    Absolute Metamyelocytes 4.7 (H) <=0.0 10e3/uL    Absolute Myelocytes 7.0 (H) <=0.0 10e3/uL    RBC Morphology Confirmed RBC Indices     Platelet Assessment  Automated Count Confirmed. Platelet morphology is normal.     Automated Count Confirmed. Platelet morphology is normal.    Smudge Cells Present (A) None Seen   XR Chest 2 Views    Narrative    XR CHEST 2 VIEWS 11/22/2023 1:12 PM    HISTORY: Change in mental status, pneumonia    COMPARISON: None available      Impression    IMPRESSION: Cardiomegaly. Bibasilar linear and patchy opacities may be  due to atelectasis or infection. Tortuous aortic arch. Mildly enlarged  cardiac silhouette. No pneumothorax.    AMINTA HEARN MD         SYSTEM ID:  [id number]   iStat Gases (lactate) venous, POCT   Result Value Ref Range    Lactic Acid POCT 4.6 (HH) <=2.0 mmol/L    Bicarbonate Venous POCT 22 21 - 28 mmol/L    O2 Sat, Venous POCT 29 (L) 94 - 100 %    pCO2 Venous POCT 39 (L) 40 - 50 mm Hg     pH Venous POCT 7.35 7.32 - 7.43    pO2 Venous POCT 20 (L) 25 - 47 mm Hg   Symptomatic Influenza A/B, RSV, & SARS-CoV2 PCR (COVID-19) Nasopharyngeal    Specimen: Nasopharyngeal; Swab   Result Value Ref Range    Influenza A PCR Negative Negative    Influenza B PCR Negative Negative    RSV PCR Negative Negative    SARS CoV2 PCR Negative Negative    Narrative    Testing was performed using the Xpert Xpress CoV2/Flu/RSV Assay on the [employer] GeneXpert Instrument. This test should be ordered for the detection of SARS-CoV-2, influenza, and RSV viruses in individuals who meet clinical and/or epidemiological criteria. Test performance is unknown in asymptomatic patients. This test is for in vitro diagnostic use under the FDA EUA for laboratories certified under CLIA to perform high or moderate complexity testing. This test has not been FDA cleared or approved. A negative result does not rule out the presence of PCR inhibitors in the specimen or target RNA in concentration below the limit of detection for the assay. If only one viral target is positive but coinfection with multiple targets is suspected, the sample should be re-tested with another FDA cleared, approved, or authorized test, if coinfection would change clinical management. This test was validated by the [facility]. These laboratories are certified under the Clinical Laboratory Improvement Amendments of 1988 (CLIA-88) as qualified to perform high complexity laboratory testing.   Head CT w/o contrast    Narrative    CT SCAN OF THE HEAD WITHOUT CONTRAST   11/22/2023 1:52 PM     HISTORY: Delirium, bleed, mass    TECHNIQUE:  Axial images of the head and coronal reformations without  IV contrast material. Radiation dose for this scan was reduced using  automated exposure control, adjustment of the mA and/or kV according  to patient size, or iterative reconstruction technique.    COMPARISON: None.    FINDINGS: There is no evidence of intracranial  hemorrhage, mass, acute  infarct or anomaly. Chronic right cerebellar infarct. The ventricles  are normal in size, shape and configuration. Mild diffuse parenchymal  volume loss. Moderate patchy periventricular white matter  hypodensities which are nonspecific, but likely related to chronic  microvascular ischemic disease. Bilateral carotid siphon  atherosclerotic calcifications.    The visualized portions of the sinuses and mastoids appear normal. The  bony calvarium and bones of the skull base appear intact.       Impression    IMPRESSION:   No evidence of acute intracranial hemorrhage, mass, or  herniation.      ELÍAS DEJESUS MD         SYSTEM ID:  [id number]   UA with Microscopic reflex to Culture    Specimen: Urine, Catheter   Result Value Ref Range    Color Urine Orange (A) Colorless, Straw, Light Yellow, Yellow    Appearance Urine Cloudy (A) Clear    Glucose Urine Negative Negative mg/dL    Bilirubin Urine Negative Negative    Ketones Urine Trace (A) Negative mg/dL    Specific Gravity Urine 1.019 1.003 - 1.035    Blood Urine Moderate (A) Negative    pH Urine 5.5 5.0 - 7.0    Protein Albumin Urine 70 (A) Negative mg/dL    Urobilinogen Urine 4.0 (A) Normal, 2.0 mg/dL    Nitrite Urine Negative Negative    Leukocyte Esterase Urine Large (A) Negative    Bacteria Urine Moderate (A) None Seen /HPF    WBC Clumps Urine Present (A) None Seen /HPF    Mucus Urine Present (A) None Seen /LPF    RBC Urine 31 (H) <=2 /HPF    WBC Urine >182 (H) <=5 /HPF    Squamous Epithelials Urine 3 (H) <=1 /HPF    Narrative    Urine Culture ordered based on laboratory criteria   CBC with platelets   Result Value Ref Range    WBC Count 82.8 (HH) 4.0 - 11.0 10e3/uL    RBC Count 3.77 (L) 3.80 - 5.20 10e6/uL    Hemoglobin 10.8 (L) 11.7 - 15.7 g/dL    Hematocrit 33.9 (L) 35.0 - 47.0 %    MCV 90 78 - 100 fL    MCH 28.6 26.5 - 33.0 pg    MCHC 31.9 31.5 - 36.5 g/dL    RDW 14.7 10.0 - 15.0 %    Platelet Count 51 (L) 150 - 450 10e3/uL   RBC and  Platelet Morphology   Result Value Ref Range    Platelet Assessment  Automated Count Confirmed. Platelet morphology is normal.     Automated Count Confirmed. Platelet morphology is normal.    Acanthocytes Slight (A) None Seen    Elías Rods      Basophilic Stippling      Bite Cells      Blister Cells      Andre Cells Moderate (A) None Seen    Elliptocytes Slight (A) None Seen    Hgb C Crystals      Kauffman-Jolly Bodies      Hypersegmented Neutrophils      Polychromasia      RBC agglutination      RBC Fragments      Reactive Lymphocytes      Rouleaux      Sickle Cells      Smudge Cells      Spherocytes      Stomatocytes      Target Cells      Teardrop Cells      Toxic Neutrophils      RBC Morphology Confirmed RBC Indices    iStat Gases (lactate) venous, POCT   Result Value Ref Range    Lactic Acid POCT 3.9 (H) <=2.0 mmol/L    Bicarbonate Venous POCT 20 (L) 21 - 28 mmol/L    O2 Sat, Venous POCT 24 (L) 94 - 100 %    pCO2 Venous POCT 40 40 - 50 mm Hg    pH Venous POCT 7.32 7.32 - 7.43    pO2 Venous POCT 18 (L) 25 - 47 mm Hg   Asymptomatic COVID-19 Virus (Coronavirus) by PCR Nose    Specimen: Nose; Swab   Result Value Ref Range    SARS CoV2 PCR Negative Negative    Narrative    Testing was performed using the Xpert Xpress SARS-CoV-2 Assay on the [employer] Gene-Xpert Instrument Systems. Additional information about this Emergency Use Authorization (EUA) assay can be found via the Lab Guide. This test should be ordered for the detection of SARS-CoV-2 in individuals who meet SARS-CoV-2 clinical and/or epidemiological criteria as well as from individuals without symptoms or other reasons to suspect COVID-19. Test performance for asymptomatic patients has only been established in anterior nasal swab specimens. This test is for in vitro diagnostic use under the FDA EUA for laboratories certified under CLIA to perform high complexity testing. This test has not been FDA cleared or approved. A negative result does not rule out the  presence of PCR inhibitors in the specimen or target RNA concentration below the limit of detection for the assay. The possibility of a false negative should be considered if the patient's recent exposure or clinical presentation suggests COVID-19. This test was validated by the [facility]. This laboratory is certified under the Clinical Laboratory Improvement Amendments (CLIA) as qualified to perform high complexity laboratory testing.     CT Abdomen Pelvis w/o Contrast    Narrative    EXAM: CT ABDOMEN PELVIS W/O CONTRAST  LOCATION: [facility]  DATE: 11/22/2023    INDICATION: Urosepsis, in shock, evaluate for hydronephrosis and renal stone  COMPARISON: None.  TECHNIQUE: CT scan of the abdomen and pelvis was performed without IV contrast. Multiplanar reformats were obtained. Dose reduction techniques were used.  CONTRAST: None.    FINDINGS:   LOWER CHEST: Bibasilar consolidation, probably dependent atelectasis. Partially imaged coronary artery calcifications.    HEPATOBILIARY: Normal.    PANCREAS: Scattered parenchymal calcifications consistent with chronic pancreatitis. No main duct dilation or peripancreatic fat stranding.    SPLEEN: Normal.    ADRENAL GLANDS: Normal.    KIDNEYS/BLADDER: Severe asymmetric right pelvicalyceal dilation with abrupt transition to normal caliber ureter. No obstructing stone or urothelial thickening. Left kidney is unremarkable. The bladder is partially obscured by streak artifact.    BOWEL: Diverticulosis of the colon. No acute inflammatory change. No obstruction.     LYMPH NODES: Normal.    VASCULATURE: Moderate multifocal atherosclerotic calcification. 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    MUSCULOSKELETAL: Osteopenia and degenerative changes in the spine. Left hip arthroplasty.      Impression    IMPRESSION:   1.  Severe  right pelvicalyceal dilation, but no obstructing ureteral calculus. Etiology uncertain and characterization is limited without IV contrast, but could be secondary to UPJ obstruction or ureteral stricture.    2.  Abnormal endometrial thickening up to 3.3 cm. Recommend further evaluation with pelvic ultrasound.    3.  Chronic pancreatitis.    4.  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       All imaging studies reviewed by me  Distended right renal pelvis and calyces appears chronically obstructed       Attestation:  I have reviewed today's vital signs, notes, medications, labs and imaging.  Amount of time performed on this consult: 45 minutes including time spent talking to ICU staff, family, OR, documentation    Isac Gonzalez MD

## 2023-11-23 NOTE — ED NOTES
Pt and daughter updated on delay for bed upstairs. Pt's daughter hoping to get pt upstairs soon as pt is uncomfortable on hospital cart. Pt repositioned onto right side. Chap stick placed on lips.

## 2023-11-23 NOTE — PROGRESS NOTES
PICC ordered, discuss with ICU staff and ICU MD.  MD Fermin johnson proceeding with PICC placement despite sepsis, patient critically ill and requires central access.

## 2023-11-23 NOTE — PROGRESS NOTES
"Notified pt in pain despite current morphine dose, increased morphine to 4 mg. 2mg dose did not \"touch\"her pain per family per nursing    Saurav Gruber MD    "

## 2023-11-23 NOTE — PROGRESS NOTES
[facility]  _________________________________________________________________    [facility] 24/7 Contact Number: [phone]    - Providers: Please contact [facility] with changes in orders or clinical plan of care   - Nursing: Please contact [facility] with significant changes in patient condition    Hospice will notify the care team (including the hospitalist) to confirm date of inpatient hospice (GIP) admission.    New Epic encounter will not be created until hospice completes admission.   _____________________________________________________________________AC CALLED SPOKE WITH PT NURSE, PT IS CURRENTLY IN ICU AND COMFORTABLE, AC CALLED SPOKE WITH MIGUELITO MEJIA SON IN LAW, AC SCHEDULED IH/SOC FOR 11/14 AT 9AM

## 2023-11-23 NOTE — PLAN OF CARE
Problem: Adult Inpatient Plan of Care  Goal: Plan of Care Review  Description: The Plan of Care Review/Shift note should be completed every shift.  The Outcome Evaluation is a brief statement about your assessment that the patient is improving, declining, or no change.  This information will be displayed automatically on your shift  note.  Outcome: pt went comfort care, given pain meds for management. Family at bedside.      Problem: Palliative Care  Goal: Enhanced Quality of Life  Outcome: Not Progressing  Intervention: Maximize Comfort  Recent Flowsheet Documentation  Taken 11/22/2023 2200 by Dougie Buenrostro RN  Oral Care:   lip/mouth moisturizer applied   oral rinse provided   swabbed with antiseptic solution   swabbed with sterile water   teeth brushed  Intervention: Optimize Function  Recent Flowsheet Documentation  Taken 11/23/2023 0300 by Dougie Buenrostro RN  Sleep/Rest Enhancement: comfort measures  Taken 11/22/2023 2200 by Dougie Buenrostro RN  Sleep/Rest Enhancement: comfort measures  Intervention: Optimize Psychosocial Wellbeing  Recent Flowsheet Documentation  Taken 11/22/2023 2200 by Dougie Buenrostro RN  Family/Support System Care:   caregiver stress acknowledged   involvement promoted   presence promoted   self-care encouraged   support provided   Goal Outcome Evaluation:

## 2023-11-24 PROBLEM — Z51.5 HOSPICE CARE: Status: ACTIVE | Noted: 2023-01-01

## 2023-11-24 NOTE — PROGRESS NOTES
Switching chart over to [facility] hospice as pt has been accepted.

## 2023-11-24 NOTE — PLAN OF CARE
Comfort cares. Pt unresponsive. Calm, PRN morphine given. T/R. Oral cares preformed. Family at bedside.

## 2023-11-24 NOTE — DISCHARGE SUMMARY
[facility]  Hospitalist Discharge Summary      Date of Admission:  11/22/2023  Date of Discharge:  11/24/2023  Discharging Provider: Sherry Basilio MD    Discharge Diagnoses   Transition to comfort care  Severe sepsis with acute organ dysfunction  Complicated UTI   Severe right hydronephrosis   Leukocytosis  Thrombocytopenia  DRISS on CKD   Acute on Chronic encephalopathy  Atrial fibrillation  Type II NSTEMI      Clinically Significant Risk Factors          Follow-ups Needed After Discharge     Ila Loredo is being readmitted to inpatient hospice    Discharge Disposition   Discharged to inpatient hospice  Condition at discharge: Guarded    Hospital Course   Ila Loredo is a 89 year old female who was admitted on 11/22/2023.  She  is being discharged from the current hospital encounter and will be readmitted to inpatient hospice.    Ila Loredo is a 89 year old female with a PMH significant for dementia, afib, SCI (wheelchair bound) admitted on 11/22/2023 for septic shock due to complicated UTI.     Severe sepsis with acute organ dysfunction  Complicated UTI   Severe right hydronephrosis   Leukocytosis  Thrombocytopenia  DRISS on CKD   Acute on Chronic encephalopathy  Atrial fibrillation  Type II NSTEMI  Patient transferred to ICU shortly after admission on 11/22 for ongoing hypotension despite fluid resuscitation with 3L crystalloid. Patient required multiple vasopressors. Urology evaluated patient for consideration of ureteral stent and nephrostomy tube, however patient's family came decision to transition patient to comfort care.   Continue comfort care measures  No vital signs  No lab draws or bedside glucose checks  [facility] hospice consulted; appreciate their involvement in Ms. Loredo's care.   Started on morphine 1 mg q 2 hours as recommended by Hospice after assessment of patient and discussion with family.   Continue PRN Morphine for pain and dyspnea.    Continue PRN Ativan for anxiety.   Continue PRN Robinul for secretions.   Continue PRN Zyprexa for agitation.       Other chronic comorbidities  - Hypothyroidism  - Osteomyelitis; spinal cord injury  - Chronic urinary and bowel incontinence  - Dementia with behavioral disturbance       Consultations This Hospital Stay   PHYSICAL THERAPY ADULT IP CONSULT  OCCUPATIONAL THERAPY ADULT IP CONSULT  VASCULAR ACCESS ADULT IP CONSULT  VASCULAR ACCESS ADULT IP CONSULT  VASCULAR ACCESS ADULT IP CONSULT  [facility] INPATIENT HOSPICE ADULT CONSULT  PHARMACY IP CONSULT    Code Status   No CPR- Do NOT Intubate    Time Spent on this Encounter   I, Sherry Basilio MD, personally saw the patient today and spent greater than 30 minutes discharging this patient.       Sherry Basilio MD  [place] [id number] [facility]  [address] [place] [address], [address]  [facility] [address]  Phone: [phone]  ______________________________________________________________________    Physical Exam   Vital Signs:           Resp: 16        Weight: 118 lbs 0 oz    Constitutional: NAD, appears to be resting comfortably  Neuropsyche: Does not respond to voice or tactile stimulation   Respiratory: Breathing regular lungs clear  Cardiovascular: Irregular rate and rhythm, 1+ edema, extremities warm  GI: soft, NT/ND, BS normal  Skin/Integumen: Scattered ecchymoses no acute rash or sign of bleeding.          Primary Care Physician   [facility]    Discharge Orders   No discharge procedures on file.    Significant Results and Procedures   Most Recent 3 CBC's:  Recent Labs   Lab Test 11/22/23  1737 11/22/23  1225 04/13/23  1050   WBC 82.8* 77.5* 12.4*   HGB 10.8* 11.7 12.5   MCV 90 89 88   PLT 51* 71* 215     Most Recent 3 BMP's:  Recent Labs   Lab Test 11/22/23  1225 10/13/22  1038 10/07/21  1105    139 141   POTASSIUM 3.7 4.2 3.7   CHLORIDE 103 104 103   CO2 20* 20* 26   BUN 26.5* 14.8 29*   CR 1.42* 0.75 0.93   ANIONGAP 16* 15 12   MONY  8.9 9.6 9.4   GLC 89 96 103     Most Recent 2 LFT's:  Recent Labs   Lab Test 10/13/22  1038 04/30/21  1020   AST 23 10   ALT 8* <9   ALKPHOS 77 83   BILITOTAL 0.7 0.5       Discharge Medications     Current Facility-Administered Medications:     acetaminophen (TYLENOL) tablet 650 mg, 650 mg, Oral, Q8H PRN, Siddharth Irving PA-C    calcium carbonate (TUMS) chewable tablet 1,000 mg, 1,000 mg, Oral, 4x Daily PRN, Siddharth Irving PA-C    carboxymethylcellulose PF (REFRESH PLUS) 0.5 % ophthalmic solution 1-2 drop, 1-2 drop, Both Eyes, Q1H PRN, Caio Christensen MD    glycopyrrolate (ROBINUL) injection 0.2 mg, 0.2 mg, Intravenous, Q4H PRN, Caio Christensen MD    lidocaine (LMX4) cream, , Topical, Q1H PRN, Siddharth Irving PA-C    lidocaine (LMX4) cream, , Topical, Q1H PRN, Caio Christensen MD    lidocaine 1 % 0.1-1 mL, 0.1-1 mL, Other, Q1H PRN, Siddharth Irving PA-C    lidocaine 1 % 0.1-5 mL, 0.1-5 mL, Other, Q1H PRN, Caio Christensen MD    LORazepam (ATIVAN) injection 1 mg, 1 mg, Intravenous, Q3H PRN, 1 mg at 11/23/23 0101 **OR** LORazepam (ATIVAN) tablet 1 mg, 1 mg, Sublingual, Q3H PRN, Caio Christensen MD    melatonin tablet 1 mg, 1 mg, Oral, At Bedtime PRN, Siddharth Irving PA-C    morphine (PF) injection 1 mg, 1 mg, Intravenous, Q2H, Sherry Basilio MD    morphine (PF) injection 1 mg, 1 mg, Intravenous, Q15 Min PRN, Caio Christensen MD, 1 mg at 11/24/23 1046    naloxone (NARCAN) injection 0.1 mg, 0.1 mg, Intravenous, Q2 Min PRN, Caio Christensen MD    naloxone (NARCAN) injection 0.2 mg, 0.2 mg, Intravenous, Q2 Min PRN, Caio Christensen MD    OLANZapine zydis (zyPREXA) ODT half-tab 2.5-5 mg, 2.5-5 mg, Oral, 4x Daily PRN, Siddharth Irving PA-C    ondansetron (ZOFRAN ODT) ODT tab 4 mg, 4 mg, Oral, Q6H PRN **OR** ondansetron (ZOFRAN) injection 4 mg, 4 mg, Intravenous, Q6H PRN, Siddharth Irving PA-C    polyethylene glycol  (MIRALAX) Packet 17 g, 17 g, Oral, Daily PRN, Siddharth Irving PA-C    senna-docusate (SENOKOT-S/PERICOLACE) 8.6-50 MG per tablet 1 tablet, 1 tablet, Oral, BID PRN **OR** senna-docusate (SENOKOT-S/PERICOLACE) 8.6-50 MG per tablet 2 tablet, 2 tablet, Oral, BID PRN, Siddharth Irving PA-C    sodium chloride (PF) 0.9% PF flush 10-40 mL, 10-40 mL, Intracatheter, Once PRN, Caio Christensen MD    sodium chloride (PF) 0.9% PF flush 3 mL, 3 mL, Intracatheter, q1 min prn, Siddharth Irving PA-C    Allergies   Allergies   Allergen Reactions    Codeine Nausea and Vomiting

## 2023-11-24 NOTE — CONSULTS
[facility]    Consult Note - [facility] Inpatient [facility]ospice  _________________________________________________________________    [facility] 24/7 Contact Number: [phone]    - Providers: Please contact [facility] with changes in orders or clinical plan of care   - Nursing: Please contact [facility] with significant changes in patient condition    Hospice will notify the care team (including the hospitalist) to confirm date of inpatient hospice (GIP) admission.    New Epic encounter will not be created until hospice completes admission.   ______________________________________________________________________        Hospice Diagnosis: Severe sepsis    Indication for Inpatient Hospice: pain management    Goals for Hospital Discharge: 3 or fewer PRN doses in a 24 hour period maintained for 48 hours. In addition, would need to transition to oral medications from IV.    Plan of Care Discussed with the Following:   - Nurse: Andrae, Radha, RN  - Charge Nurse: SHABBIR Diana  - Hospitalist/Rounding Provider: Sherry Basilio MD  - [facility] Family/Preferred Contact: Pat (daughter), Daniel (Son in Law), Brendan (Son)   - Hospice Provider: Dr. Khloe Ball    Summary of Visit (includes assessment, medications and any new orders):   [facility] Assessed Ila, she is unresponsive to voice or touch. Open mouth breathing 14/min. Face is relaxed, no movement. Modeling on left foot. Estimated hours to one day left of life.   Educated family on hospice care and provided emotional support. Pat has been through 3 deaths with hospice in the past year so is familiar. Encouraged her to utilize our bereavement services to help process the loss. Pat signed consents for her mom.  Spoke to Hospice Dr. Khloe Ball, admit to [facility] hospice for above reasons. Recommended schedule morphine 1mg Q2H IV and keep 1mg Q1hr PRN breakthrough pain. If  stable for 24 hours trial transition to oral liquid meds.   Paged Sherry Basilio MD, received call back. She will flip the chart and place the orders. She will order the morphine as recommended.   Updated Charge nurse Adalgisa.     Thank you for your great care of this patient!      Jill Rolle RN

## 2023-11-24 NOTE — PHARMACY-ADMISSION MEDICATION HISTORY
Patient was discharged and admitted to inpatient hospice.      Admission medication history completed at [facility]. Please see Pharmacy - Admission Medication History note from 11/22/23.

## 2023-11-24 NOTE — PLAN OF CARE
Goal Outcome Evaluation:    Orientation: KARON, sedated  Activity: A/2, WC bound at baseline. T/Rq2h  Diet/BS Checks: NPO, oral cares  IV Access/Drains: PIV SL x2  Pain Management: PRN morphine given Q1-2hrs, pt overall appears comfortable, no signs of pain noted  Abnormal VS/Results: VS and assessments deferred  Bowel/Bladder: Incont, no UOP, no BM  Skin/Wounds: Non-blanchable skin to coccyx and back, scattered bruising  Consults: [facility] hospice  D/C Disposition: [facility] plan to meet with family today around 0900  Other Info: Many family members visiting yesterday. Daughter at bedside overnight.

## 2023-11-25 NOTE — PROGRESS NOTES
Patient time of death was 0810. Family present at bedside. Belongings (quilt) sent with family. Autopsy declined by family. Death record completed. Patient sent to [facility] at 1355 via security.

## 2023-11-25 NOTE — PLAN OF CARE
Orientation: KARON, unresponsive  Activity: Bedrest, T/R Q2-3H, family refuses at times  Diet/BS Checks: Regular, no longer eating  Tele: N/A  IV Access/Drains: L PIV x2  Pain Management: Scheduled morphine Q2H, PRN also available  Abnormal VS/Results: Deferred, comfort care  Bowel/Bladder: No urine output or BM this shift  Skin/Wounds: Scattered bruising  Consults: [facility]  D/C Disposition: Continue [facility] hospice care on unit  Other Info: Full assessment and VS deferred. Oral cares provided Q2-3H. Family present at bedside throughout shift.

## 2023-11-25 NOTE — H&P
[facility]    History and Physical - Hospitalist Service       Date of Admission:  11/24/2023    Assessment & Plan   Ila Loredo is a 89 year old female who is being transitioned to inpatient hospice on 11/24/2023. Please see the discharge summary from the same date for more details of her hospital course.      Severe sepsis with acute organ dysfunction  Complicated UTI   Severe right hydronephrosis   Leukocytosis  Thrombocytopenia  DRISS on CKD   Acute on Chronic encephalopathy  Atrial fibrillation  Type II NSTEMI  Patient transferred to ICU shortly after admission on 11/22 for ongoing hypotension despite fluid resuscitation with 3L crystalloid. Patient required multiple vasopressors. Urology evaluated patient for consideration of ureteral stent and nephrostomy tube, however patient's family came decision to transition patient to comfort care.   Continue comfort care measures  No vital signs  No lab draws or bedside glucose checks  [facility] hospice consulted; appreciate their involvement in Ms. Loredo's care.   Started on morphine 1 mg q 2 hours as recommended by Hospice after assessment of patient and discussion with family.   Continue PRN Morphine for pain and dyspnea.   Continue PRN Ativan for anxiety.   Continue PRN Robinul for secretions.   Continue PRN Zyprexa for agitation.      Met with family at bedside, patient currently appears comfortable and family appear at peace. Answered questions. I do not see signs currently that her death is imminent, breathing regular, extremity warm, but explained it is hard to predict. We will continue to ensure comfort. Family very appreciative.        Diet: Combination Diet Regular Diet Adult    Forrest Catheter: Not present  Lines: None     Code Status: No CPR- Do NOT Intubate    Expected Discharge: working on discharge with hospice    Sherry Basilio MD  Hospitalist Service  [facility]  Securely message with  Diana (more info)  Text page via [facility] Paging/Directory     ______________________________________________________________________    Chief Complaint   Transitioning to inpatient hospice    History of Present Illness   Ila Loredo is a 89 year old female who is being transitioned to inpatient hospice.  Please see the discharge summary from the same date for more details; a brief summary of her current symptoms is included here.    Patient appears comfortable. Non-verbal. Not responding to verbal or tactile stimulus.     Physical Exam   Vital Signs:           Resp: 16        Weight: 0 lbs 0 oz    Physical exam deferred given current goals of care (See discharge summary)     Medical Decision Making       Total of 40(including discharge and readmission)  MINUTES SPENT BY ME on the date of service doing chart review, history, exam, documentation & further activities per the note.

## 2023-11-25 NOTE — PLAN OF CARE
Orientation: KARON, unresponsive  Activity: Bedrest, Turn/repo Q2, family refused nigh time turns.  Diet/BS Checks: Regular, no longer eating  Tele: N/A  IV Access/Drains: L PIV x2, hand PIV used for meds  Pain Management: Scheduled morphine Q2H, pt has PRN available.  Abnormal VS/Results: Deferred d/t comfort care  Bowel/Bladder: No urine output or BM this shift  Skin/Wounds: Scattered bruising  Consults: [facility]  D/C Disposition: pt to pass in hospital  Other Info: Son present at bedside overnight, full assessment deferred d/t comfort cares. Family refused turn/repo.

## 2023-11-25 NOTE — DEATH PRONOUNCEMENT
NP DEATH PRONOUNCEMENT    Called to pronounce Ila Loredo dead.    Physical Exam: Spontaneous respirations absent, Breath sounds absent, Carotid pulse absent, and Heart sounds absent    Patient was pronounced dead at 8:10 AM, 2023.    Preliminary Cause of Death: Septic Shock    Principal Problem:    Hospice care       Infectious disease present?: YES    Communicable disease present? (examples: HIV, chicken pox, TB, Ebola, CJD) :  NO    Multi-drug resistant organism present? (example: MRSA): NO    Please consider an autopsy if any of the following exist:  NO Unexpected or unexplained death during or following any dental, medical, or surgical diagnostic treatment procedures.   NO Death of mother at or up to seven days after delivery.     NO All  and pediatric deaths.     NO Death where the cause is sufficiently obscure to delay completion of the death certificate.   NO Deaths in which autopsy would confirm a suspected illness/condition that would affect surviving family members or recipients of transplanted organs.     The following deaths must be reported to the 's Office:  NO A death that may be due entirely or in part to any factors other than natural disease (recent surgery, recent trauma, suspected abuse/neglect).   NO A death that may be an accident, suicide, or homicide.     NO Any sudden, unexpected death in which there is no prior history of significant heart disease or any other condition associated with sudden death.   NO A death under suspicious, unusual, or unexpected circumstances.    NO Any death which is apparently due to natural causes but in which the  does not have a personal physician familiar with the patient s medical history, social, or environmental situation or the circumstances of the terminal event.   NO Any death apparently due to Sudden Infant Death Syndrome.     NO Deaths that occur during, in association with, or as consequences of a  diagnostic, therapeutic, or anesthetic procedure.   NO Any death in which a fracture of a major bone has occurred within the past (6) six months.   NO A death of persons note seen by their physician within 120 days of demise.     NO Any death in which the  was an inmate of a public institution or was in the custody of Law Enforcement personnel.   NO  All unexpected deaths of children   NO Solid organ donors   NO Unidentified bodies   YES Deaths of persons whose bodies are to be cremated or otherwise disposed of so that the bodies will later be unavailable for examination;   NO Deaths unattended by a physician outside of a licensed healthcare facility or licensed residential hospice program   NO Deaths occurring within 24 hours of arrival to a health care facility if death is unexpected.    NO Deaths associated with the decedent s employment.   NO Deaths attributed to acts of terrorism.   NO Any death in which there is uncertainty as to whether it is a medical examiner s care should be discussed with the medical investigator.        Body disposition: Autopsy was discussed with family member:  Children in person.  Permission for autopsy was declined.  Body released to the morgue.    Damian Bautista NP  [facility]  Securely message with the Vocera Web Console (learn more here)  Text page via [employer] Paging/Directory

## 2023-11-27 NOTE — DISCHARGE SUMMARY
[facility]    Death Summary - Hospitalist Service     Date of Admission:  11/24/2023  Date of Death: 11/25/2023  8:10 AM  Discharging Provider: Sherry Basilio MD    Discharge Diagnoses   Severe sepsis with acute organ dysfunction  Complicated UTI   Severe right hydronephrosis   Leukocytosis  Thrombocytopenia  DRISS on CKD   Acute on Chronic encephalopathy  Atrial fibrillation  Type II NSTEMI    Cause of death: Sepsis with multisystem failure.     Hospital Course   Ila Loredo is a 89 year old female who was transitioned to inpatient hospice on 11/24/2023. She remained comfortable on hospice cares until she passed away in the AM of 11/25.     Sherry Basilio MD  [facility]  ______________________________________________________________________      Significant Results and Procedures   No labs or procedures done during this stay.     Consultations This Hospital Stay   PHARMACY IP CONSULT    Primary Care Physician   [facility]    Time Spent on this Encounter   I, Sherry Basilio MD, discharged this patient today but I did not personally see the patient today and will not be billing for the patient's discharge.